# Patient Record
Sex: MALE | Race: WHITE | NOT HISPANIC OR LATINO | URBAN - METROPOLITAN AREA
[De-identification: names, ages, dates, MRNs, and addresses within clinical notes are randomized per-mention and may not be internally consistent; named-entity substitution may affect disease eponyms.]

---

## 2019-01-07 ENCOUNTER — EMERGENCY (EMERGENCY)
Facility: HOSPITAL | Age: 49
LOS: 0 days | Discharge: HOME | End: 2019-01-07
Attending: EMERGENCY MEDICINE | Admitting: EMERGENCY MEDICINE

## 2019-01-07 VITALS
RESPIRATION RATE: 18 BRPM | OXYGEN SATURATION: 98 % | SYSTOLIC BLOOD PRESSURE: 135 MMHG | HEART RATE: 76 BPM | DIASTOLIC BLOOD PRESSURE: 76 MMHG

## 2019-01-07 VITALS
DIASTOLIC BLOOD PRESSURE: 75 MMHG | TEMPERATURE: 95 F | WEIGHT: 214.95 LBS | HEIGHT: 70 IN | HEART RATE: 86 BPM | OXYGEN SATURATION: 99 % | RESPIRATION RATE: 18 BRPM | SYSTOLIC BLOOD PRESSURE: 137 MMHG

## 2019-01-07 DIAGNOSIS — E16.2 HYPOGLYCEMIA, UNSPECIFIED: ICD-10-CM

## 2019-01-07 DIAGNOSIS — E11.649 TYPE 2 DIABETES MELLITUS WITH HYPOGLYCEMIA WITHOUT COMA: ICD-10-CM

## 2019-01-07 DIAGNOSIS — I10 ESSENTIAL (PRIMARY) HYPERTENSION: ICD-10-CM

## 2019-01-07 LAB
ALBUMIN SERPL ELPH-MCNC: 4.6 G/DL — SIGNIFICANT CHANGE UP (ref 3.5–5.2)
ALP SERPL-CCNC: 124 U/L — HIGH (ref 30–115)
ALT FLD-CCNC: 19 U/L — SIGNIFICANT CHANGE UP (ref 0–41)
ANION GAP SERPL CALC-SCNC: 21 MMOL/L — HIGH (ref 7–14)
APPEARANCE UR: CLEAR — SIGNIFICANT CHANGE UP
AST SERPL-CCNC: 25 U/L — SIGNIFICANT CHANGE UP (ref 0–41)
BASOPHILS # BLD AUTO: 0.03 K/UL — SIGNIFICANT CHANGE UP (ref 0–0.2)
BASOPHILS NFR BLD AUTO: 0.2 % — SIGNIFICANT CHANGE UP (ref 0–1)
BILIRUB SERPL-MCNC: 0.2 MG/DL — SIGNIFICANT CHANGE UP (ref 0.2–1.2)
BILIRUB UR-MCNC: NEGATIVE — SIGNIFICANT CHANGE UP
BUN SERPL-MCNC: 15 MG/DL — SIGNIFICANT CHANGE UP (ref 10–20)
CALCIUM SERPL-MCNC: 9 MG/DL — SIGNIFICANT CHANGE UP (ref 8.5–10.1)
CHLORIDE SERPL-SCNC: 98 MMOL/L — SIGNIFICANT CHANGE UP (ref 98–110)
CO2 SERPL-SCNC: 20 MMOL/L — SIGNIFICANT CHANGE UP (ref 17–32)
COLOR SPEC: YELLOW — SIGNIFICANT CHANGE UP
CREAT SERPL-MCNC: 0.9 MG/DL — SIGNIFICANT CHANGE UP (ref 0.7–1.5)
DIFF PNL FLD: NEGATIVE — SIGNIFICANT CHANGE UP
EOSINOPHIL # BLD AUTO: 0 K/UL — SIGNIFICANT CHANGE UP (ref 0–0.7)
EOSINOPHIL NFR BLD AUTO: 0 % — SIGNIFICANT CHANGE UP (ref 0–8)
GLUCOSE SERPL-MCNC: 160 MG/DL — HIGH (ref 70–99)
GLUCOSE UR QL: 100 MG/DL
HCT VFR BLD CALC: 42.5 % — SIGNIFICANT CHANGE UP (ref 42–52)
HGB BLD-MCNC: 15 G/DL — SIGNIFICANT CHANGE UP (ref 14–18)
IMM GRANULOCYTES NFR BLD AUTO: 0.6 % — HIGH (ref 0.1–0.3)
KETONES UR-MCNC: NEGATIVE — SIGNIFICANT CHANGE UP
LEUKOCYTE ESTERASE UR-ACNC: NEGATIVE — SIGNIFICANT CHANGE UP
LYMPHOCYTES # BLD AUTO: 0.7 K/UL — LOW (ref 1.2–3.4)
LYMPHOCYTES # BLD AUTO: 4.2 % — LOW (ref 20.5–51.1)
MCHC RBC-ENTMCNC: 31.5 PG — HIGH (ref 27–31)
MCHC RBC-ENTMCNC: 35.3 G/DL — SIGNIFICANT CHANGE UP (ref 32–37)
MCV RBC AUTO: 89.3 FL — SIGNIFICANT CHANGE UP (ref 80–94)
MONOCYTES # BLD AUTO: 0.4 K/UL — SIGNIFICANT CHANGE UP (ref 0.1–0.6)
MONOCYTES NFR BLD AUTO: 2.4 % — SIGNIFICANT CHANGE UP (ref 1.7–9.3)
NEUTROPHILS # BLD AUTO: 15.44 K/UL — HIGH (ref 1.4–6.5)
NEUTROPHILS NFR BLD AUTO: 92.6 % — HIGH (ref 42.2–75.2)
NITRITE UR-MCNC: NEGATIVE — SIGNIFICANT CHANGE UP
NRBC # BLD: 0 /100 WBCS — SIGNIFICANT CHANGE UP (ref 0–0)
PH UR: 6 — SIGNIFICANT CHANGE UP (ref 5–8)
PLATELET # BLD AUTO: 282 K/UL — SIGNIFICANT CHANGE UP (ref 130–400)
POTASSIUM SERPL-MCNC: 4.4 MMOL/L — SIGNIFICANT CHANGE UP (ref 3.5–5)
POTASSIUM SERPL-SCNC: 4.4 MMOL/L — SIGNIFICANT CHANGE UP (ref 3.5–5)
PROT SERPL-MCNC: 7.9 G/DL — SIGNIFICANT CHANGE UP (ref 6–8)
PROT UR-MCNC: NEGATIVE MG/DL — SIGNIFICANT CHANGE UP
RBC # BLD: 4.76 M/UL — SIGNIFICANT CHANGE UP (ref 4.7–6.1)
RBC # FLD: 11.9 % — SIGNIFICANT CHANGE UP (ref 11.5–14.5)
SODIUM SERPL-SCNC: 139 MMOL/L — SIGNIFICANT CHANGE UP (ref 135–146)
SP GR SPEC: 1.01 — SIGNIFICANT CHANGE UP (ref 1.01–1.03)
UROBILINOGEN FLD QL: 0.2 MG/DL — SIGNIFICANT CHANGE UP (ref 0.2–0.2)
WBC # BLD: 16.67 K/UL — HIGH (ref 4.8–10.8)
WBC # FLD AUTO: 16.67 K/UL — HIGH (ref 4.8–10.8)

## 2019-01-07 NOTE — ED ADULT NURSE NOTE - NSIMPLEMENTINTERV_GEN_ALL_ED
Implemented All Universal Safety Interventions:  [place] to call system. Call bell, personal items and telephone within reach. Instruct patient to call for assistance. Room bathroom lighting operational. Non-slip footwear when patient is off stretcher. Physically safe environment: no spills, clutter or unnecessary equipment. Stretcher in lowest position, wheels locked, appropriate side rails in place.

## 2019-01-07 NOTE — ED PROVIDER NOTE - OBJECTIVE STATEMENT
47 yo M with PMHx of HTN and IDDM presents to the ED for evaluation of hypoglycemia. Pt was found unresponsive by significant other after he was not waking up to alarm. She called EMS and when EMS arrived, FS was 29. Glucagon was given with improvement in mental status. 47 yo M with PMHx of HTN and IDDM presents to the ED for evaluation of hypoglycemia. Pt was found unresponsive by significant other after he was not waking up to alarm. She called EMS and when EMS arrived, FS was 29. Glucagon was given with improvement in mental status. Pt denies any complaints. Pt denies fever, chills, nausea, vomiting, abdominal pain, diarrhea, headache, dizziness, weakness, chest pain, SOB, back pain, trauma, urinary symptoms, cough, calf pain/swelling, recent travel, recent surgery.

## 2019-01-07 NOTE — ED PROVIDER NOTE - PHYSICAL EXAMINATION
VITAL SIGNS: I have reviewed nursing notes and confirm.  CONSTITUTIONAL: Well-developed; well-nourished; in no acute distress.  SKIN: Skin exam is warm and dry, no acute rash.  HEAD: Normocephalic; atraumatic.  EYES: PERRL, EOM intact; conjunctiva and sclera clear.  ENT: No nasal discharge; airway clear.   NECK: Supple; non tender.  CARD: S1, S2 normal; no murmurs, gallops, or rubs. Regular rate and rhythm.  RESP: No wheezes, rales or rhonchi. Speaking in full sentences.   ABD: Normal bowel sounds; soft; non-distended; non-tender; No rebound or guarding.   EXT: Normal ROM. No clubbing, cyanosis or edema.  NEURO: Alert, oriented. Grossly unremarkable. No focal deficits. CN II-XII intact. No dysmetria. No ataxia. Sensation intact and equal throughout. Strength 5/5 throughout. Gait steady.

## 2019-01-07 NOTE — ED PROVIDER NOTE - ATTENDING CONTRIBUTION TO CARE
48m w HTN & IDDM presents for episode of hypoglycemia. Wife reports that patient did not wake up to alarm and was unarousable so she called EMS. EMS reports FS 29 and glucagon given w imrpoved mental state. Pt reports that his FS was 300's yesterday afternoon and he gave himself a higher bolus of insulin in the afternoon and then did not eat dinner last night. Pt reports feeling well otherwise. No other injury, no other complaints. No fall, no trauma.    Review of Systems  Constitutional:  No fever or chills.   Eyes:  Negative.   ENMT:  No nasal congestion, discharge, or throat pain.   Cardiac:  No chest pain, palpitations, or edema.  Respiratory:  No dyspnea, wheezing, or cough. No hemoptysis.  GI:  No vomiting, diarrhea, or abdominal pain. No melena or hematochezia.  :  No dysuria or hematuria.   Musculoskeletal:  No joint swelling, joint pain, or back pain.  Skin:  No skin rash, jaundice, or lesions.  Neuro:  No headache, loss of sensation, or focal weakness.      Physical Exam  General: Awake, alert, NAD, WDWN, non-toxic appearing, NCAT  Eyes: PERRL, EOMI, no icterus, lids and conjunctivae are normal  ENT: External inspection normal, pink/moist membranes, pharynx normal  CV: S1S2, regular rate and rhythm, no murmur/gallops/rubs, no JVD, 2+ pulses b/l, no edema/cords/homans, warm/well-perfused  Respiratory: Normal respiratory rate/effort, no respiratory distress, normal voice, speaking full sentences, lungs clear to auscultation b/l, no wheezing/rales/rhonchi, no retractions, no stridor  Abdomen: Soft abdomen, no tender/distended/guarding/rebound, no CVA tender  Musculoskeletal: FROM all 4 extremities, N/V intact  Neck: FROM neck, supple, no meningismus, trachea midline, no JVD  Integumentary: Color normal for race, warm and dry, no rash  Neuro: Oriented x3, CN 2-12 grossly intact, normal motor, normal sensory  Psych: Oriented x3, mood normal, affect normal     48m w episode of hypoglycemia after not eating and adjusting insulin dose. no trauma. --CBC, CMP, CXR, UA. --Pt being fed at this time, insulin pump removed during eval. 48m w HTN & IDDM presents for episode of hypoglycemia. Wife reports that patient did not wake up to alarm and was unarousable so she called EMS. EMS reports FS 29 and glucagon given w improved mental state. Pt reports that his FS was 300's yesterday afternoon and he gave himself a higher bolus of insulin in the afternoon and then did not eat dinner last night. Pt reports feeling well otherwise. No other injury, no other complaints. No fall, no trauma.    Review of Systems  Constitutional:  No fever or chills.   Eyes:  Negative.   ENMT:  No nasal congestion, discharge, or throat pain.   Cardiac:  No chest pain, palpitations, or edema.  Respiratory:  No dyspnea, wheezing, or cough. No hemoptysis.  GI:  No vomiting, diarrhea, or abdominal pain. No melena or hematochezia.  :  No dysuria or hematuria.   Musculoskeletal:  No joint swelling, joint pain, or back pain.  Skin:  No skin rash, jaundice, or lesions.  Neuro:  No headache, loss of sensation, or focal weakness.      Physical Exam  General: Awake, alert, NAD, WDWN, non-toxic appearing, NCAT  Eyes: PERRL, EOMI, no icterus, lids and conjunctivae are normal  ENT: External inspection normal, pink/moist membranes, pharynx normal  CV: S1S2, regular rate and rhythm, no murmur/gallops/rubs, no JVD, 2+ pulses b/l, no edema/cords/homans, warm/well-perfused  Respiratory: Normal respiratory rate/effort, no respiratory distress, normal voice, speaking full sentences, lungs clear to auscultation b/l, no wheezing/rales/rhonchi, no retractions, no stridor  Abdomen: Soft abdomen, no tender/distended/guarding/rebound, no CVA tender  Musculoskeletal: FROM all 4 extremities, N/V intact  Neck: FROM neck, supple, no meningismus, trachea midline, no JVD  Integumentary: Color normal for race, warm and dry, no rash  Neuro: Oriented x3, CN 2-12 grossly intact, normal motor, normal sensory  Psych: Oriented x3, mood normal, affect normal     48m w episode of hypoglycemia after not eating and adjusting insulin dose. no trauma. --CBC, CMP, CXR, UA. --Pt being fed at this time, insulin pump removed during eval, will observe for recurrence of hypoglycemia.

## 2019-01-07 NOTE — ED PROVIDER NOTE - NSFOLLOWUPINSTRUCTIONS_ED_ALL_ED_FT
Hypoglycemia    Hypoglycemia occurs when the glucose (sugar) level in your blood is too low. Symptoms include confusion, weakness, or fainting. You may even appear to be having a stroke. Take medications exactly as prescribed by your health care professional. Maintain a healthy lifestyle and follow up with your primary care physician.    SEEK IMMEDIATE MEDICAL CARE IF YOU HAVE ANY OF THE FOLLOWING SYMPTOMS: weakness, fainting, change in mental status, nausea or vomiting, fruity smell to your breath, or any signs of dehydration.

## 2019-01-07 NOTE — ED ADULT NURSE NOTE - OBJECTIVE STATEMENT
pt aox4 ; ems brought pt to ed; wife states his alarm went off and pt did not respond to his alarm as he usually does.  wife reports pt was extremely lethargic and not responding to verbal.  EMS arrived to house and pt f/s was 29.  pt states he last remembers ems waking him up. pt received glucagon and sugar paste from ems. f/s 169. pt has insulin pump attached to him that is now removed on arrival to ed. pt states his f/s was in the 300s yesterday afternoon so he did not eat dinner last night.  pt in no distress at this time. given food and drink. tolerating po. iv in place, labs sent. Will continue to monitor/assess

## 2019-01-07 NOTE — ED ADULT NURSE REASSESSMENT NOTE - NS ED NURSE REASSESS COMMENT FT1
pt assessed A&Ox3, pt wife at bedside, pt states he is feeling much better and as per wife he looks much better, urine collect from patient and sent to lab as per MD orders. safety and comfort measures maintained. will continue to monitor

## 2019-01-07 NOTE — ED ADULT TRIAGE NOTE - CHIEF COMPLAINT QUOTE
BIBA pt was found by girlfriend unresponsive, FS 29 by EMS, received sugar paste and 1g glucagon, repeat . FS in triage 123.

## 2019-01-07 NOTE — ED PROVIDER NOTE - PROGRESS NOTE DETAILS
Pt signed out to me by KERMIT Nascimento. Will f/u labs and continue to monitor Signed out to Dr Graham. Pt being observed for recurrence of hypoglycemia. Signed out to Dr Graham. Labs pending. Pt being observed for recurrence of hypoglycemia. Pt continues to remain asymptomatic. Tolerating PO intake without nausea/vomiting. DM with insulin pump, plausible reason for hypoglycemia, labs WNL, asymptomatic in ED, plan to d/c, pt amendable to plan. The patient was given detailed return precautions and advised to return to the emergency department if any new symptoms developed, symptoms worsened or for any concerns. The patient was offered the opportunity to ask questions and verbalized that they understand the diagnosis and discharge instructions.    Incidental chest radiographic findings were discussed with the patient and a copy of the findings were given to the patient. The patient was advised to follow up as an outpatient for further evaluation and management of these findings. The patient verbalized that they understand these instructions.

## 2019-09-03 ENCOUNTER — EMERGENCY (EMERGENCY)
Facility: HOSPITAL | Age: 49
LOS: 0 days | Discharge: LEFT BEFORE DISCHARGE | End: 2019-09-03
Attending: EMERGENCY MEDICINE | Admitting: HOSPITALIST
Payer: COMMERCIAL

## 2019-09-03 VITALS
SYSTOLIC BLOOD PRESSURE: 118 MMHG | DIASTOLIC BLOOD PRESSURE: 73 MMHG | HEART RATE: 89 BPM | TEMPERATURE: 99 F | RESPIRATION RATE: 18 BRPM | OXYGEN SATURATION: 98 %

## 2019-09-03 VITALS — WEIGHT: 220.02 LBS

## 2019-09-03 DIAGNOSIS — E10.649 TYPE 1 DIABETES MELLITUS WITH HYPOGLYCEMIA WITHOUT COMA: ICD-10-CM

## 2019-09-03 DIAGNOSIS — Z90.49 ACQUIRED ABSENCE OF OTHER SPECIFIED PARTS OF DIGESTIVE TRACT: Chronic | ICD-10-CM

## 2019-09-03 PROBLEM — E11.9 TYPE 2 DIABETES MELLITUS WITHOUT COMPLICATIONS: Chronic | Status: ACTIVE | Noted: 2019-01-07

## 2019-09-03 PROBLEM — I10 ESSENTIAL (PRIMARY) HYPERTENSION: Chronic | Status: ACTIVE | Noted: 2019-01-07

## 2019-09-03 LAB
ALBUMIN SERPL ELPH-MCNC: 4.5 G/DL — SIGNIFICANT CHANGE UP (ref 3.5–5.2)
ALP SERPL-CCNC: 113 U/L — SIGNIFICANT CHANGE UP (ref 30–115)
ALT FLD-CCNC: 25 U/L — SIGNIFICANT CHANGE UP (ref 0–41)
ANION GAP SERPL CALC-SCNC: 17 MMOL/L — HIGH (ref 7–14)
AST SERPL-CCNC: 27 U/L — SIGNIFICANT CHANGE UP (ref 0–41)
BILIRUB SERPL-MCNC: 0.2 MG/DL — SIGNIFICANT CHANGE UP (ref 0.2–1.2)
BUN SERPL-MCNC: 17 MG/DL — SIGNIFICANT CHANGE UP (ref 10–20)
CALCIUM SERPL-MCNC: 9 MG/DL — SIGNIFICANT CHANGE UP (ref 8.5–10.1)
CHLORIDE SERPL-SCNC: 102 MMOL/L — SIGNIFICANT CHANGE UP (ref 98–110)
CO2 SERPL-SCNC: 22 MMOL/L — SIGNIFICANT CHANGE UP (ref 17–32)
CREAT SERPL-MCNC: 1.1 MG/DL — SIGNIFICANT CHANGE UP (ref 0.7–1.5)
GLUCOSE SERPL-MCNC: 66 MG/DL — LOW (ref 70–99)
HCT VFR BLD CALC: 40.5 % — LOW (ref 42–52)
HGB BLD-MCNC: 14 G/DL — SIGNIFICANT CHANGE UP (ref 14–18)
MCHC RBC-ENTMCNC: 31.9 PG — HIGH (ref 27–31)
MCHC RBC-ENTMCNC: 34.6 G/DL — SIGNIFICANT CHANGE UP (ref 32–37)
MCV RBC AUTO: 92.3 FL — SIGNIFICANT CHANGE UP (ref 80–94)
NRBC # BLD: 0 /100 WBCS — SIGNIFICANT CHANGE UP (ref 0–0)
PLATELET # BLD AUTO: 284 K/UL — SIGNIFICANT CHANGE UP (ref 130–400)
POTASSIUM SERPL-MCNC: 4.6 MMOL/L — SIGNIFICANT CHANGE UP (ref 3.5–5)
POTASSIUM SERPL-SCNC: 4.6 MMOL/L — SIGNIFICANT CHANGE UP (ref 3.5–5)
PROT SERPL-MCNC: 7.7 G/DL — SIGNIFICANT CHANGE UP (ref 6–8)
RBC # BLD: 4.39 M/UL — LOW (ref 4.7–6.1)
RBC # FLD: 11.7 % — SIGNIFICANT CHANGE UP (ref 11.5–14.5)
SODIUM SERPL-SCNC: 141 MMOL/L — SIGNIFICANT CHANGE UP (ref 135–146)
WBC # BLD: 11.02 K/UL — HIGH (ref 4.8–10.8)
WBC # FLD AUTO: 11.02 K/UL — HIGH (ref 4.8–10.8)

## 2019-09-03 PROCEDURE — 99285 EMERGENCY DEPT VISIT HI MDM: CPT

## 2019-09-03 NOTE — ED ADULT NURSE REASSESSMENT NOTE - NS ED NURSE REASSESS COMMENT FT1
Pt a&ox3,  as per pt reading from insulin pump. Pt refusing to stay and wants to leave AMA, MD Lauren spoke with pt at bedside. Pt refusing to wait for admitting resident to sign pt out AMA. MD verbalized to pt risks of leaving AMA. Md removed pts IV at bedside and pt left without letting RN take new set of VS. Pt ambulated to car independently. MAR notified about elopement.

## 2019-09-03 NOTE — ED ADULT NURSE NOTE - NS ED NURSE ELOPE COMMENTS
MD Lauren removed pts IV, discussed risks of leaving AMA. Pt did not want to wait to admitting resident or have VS taken by RN. Pt ambulated independently out of hospital, friend at pts side. MAR notified of elopement.

## 2019-09-03 NOTE — SBIRT NOTE ADULT - NSSBIRTALCPOSREINDET_GEN_A_CORE
Positive reinforcement provided given patient currently within healthy guidelines. Education materials reviewed and given to patient.

## 2019-09-03 NOTE — ED ADULT TRIAGE NOTE - CHIEF COMPLAINT QUOTE
Hypoglycemia. Pt BIBA, found by wife on couch after drinking, hard to arouse, FS was 23 by EMS, given 1 amp d50, fs 119 in triage, awake and alert. Pt is diabetic with insulin pump in place.

## 2019-09-03 NOTE — ED ADULT NURSE NOTE - OBJECTIVE STATEMENT
Pt a&ox3,pt BIBA this morning 2/2 being unarousable after drinking found by wife. Pts FS 23 as per EMS, IV administered given 1 amp of D50. Pt alert and awake, ambulated to bathroom with steady gait. Pt denies n/v/d, denies fever/chills, denies abd pain, denies CP/SOB, denies vision changes, changes in urinary patterns.

## 2019-09-03 NOTE — ED PROVIDER NOTE - PHYSICAL EXAMINATION
Vital Signs: I have reviewed the initial vital signs.  Constitutional: NAD, appears stated age.  HEENT: Airway patent, moist MM, no erythema/swelling/deformity of oral structures. EOMI, PERRLA.  CV: regular rate, regular rhythm, well-perfused extremities, 2+ b/l DP and radial pulses equal.  Lungs: BCTA, no increased WOB.  ABD: NTND, no guarding or rebound, no pulsatile mass, no hernias. +BS  MSK: Neck supple, nontender, nl ROM, no stepoff. Chest nontender. Back nontender in TLS spine or to b/l bony structures or flanks. Ext nontender, nl rom, no deformity.   INTEG: Skin warm, dry, no rash. No edema.  NEURO: A&O, normal strength, nl sensation throughout, normal speech.   PSYCH: Calm, cooperative, normal affect and interaction.

## 2019-09-03 NOTE — ED PROVIDER NOTE - NS ED ROS FT
Constitutional: (-) fever, (-) chills  Eyes/ENT: (-) blurry vision, (-) epistaxis, (-) sore throat  Cardiovascular: (-) chest pain, (-) syncope  Respiratory: (-) cough, (-) shortness of breath  Gastrointestinal: (-) abdominal pain, (-) vomiting, (-) diarrhea  Musculoskeletal: (-) neck pain, (-) back pain, (-) joint pain  Integumentary: (-) rash, (-) edema  Neurological: (-) headache, (-) altered mental status  : (-) dysuria, hematuria.  Allergic/Immunologic: (-) pruritus, rash

## 2019-09-03 NOTE — ED PROVIDER NOTE - OBJECTIVE STATEMENT
50yo male with PMH DM type 1 and hypertension, presenting to the ED after being found unresponsive at home by girlfriend. EMS was called, FS at that time 23; was given 1 amp D50 - FS recovered to 119. Patient unable to recall events leading up to presentation; states he was at home, was hyperglycemic in the 300s yesterday and was dosing himself insulin all day via pump; had a few alcoholic beverages in the evening. Last thing he remembers was going to sleep. Denies any fall or trauma. Is currently accompanied by friend. States the last time this happened was in January 2019. He has been using his insulin pump for the last 3 years or so, and follows up with an endocrinologist every 6 months.    In the ED, patient is alert and oriented, able to participate in the interview 48yo male with PMH DM type 1 on insulin pump and hypertension, presenting to the ED after being found unresponsive at home by girlfriend. EMS was called, FS at that time 23; was given 1 amp D50 - FS recovered to 119. Is alert and oriented in the ED. Patient unable to recall events leading up to presentation; states he was at home, was hyperglycemic in the 300s yesterday and was dosing himself insulin all day via pump; had a few alcoholic beverages in the evening. Last thing he remembers was going to sleep. Denies any fall or trauma, fever, chills, polyuria, polydipsia, abdominal pain, nausea.

## 2019-09-03 NOTE — ED PROVIDER NOTE - ATTENDING CONTRIBUTION TO CARE
I personally evaluated the patient. I reviewed the Resident’s or Physician Assistant’s note (as assigned above), and agree with the findings and plan except as documented in my note.    50 y/o male with PMH DM type 1 on insulin pump and hypertension, presenting to the ED after being found unresponsive at home by girlfriend. EMS was called, FS at that time 23; was given 1 amp D50 - FS recovered to 119. Pt on insulin pump but he does not know his insulin regimen. Denies any CP, SOB. No abd pain. Reports heavy drinking yesterday.   CONSTITUTIONAL: Well-developed; well-nourished; in no acute distress. Sitting up and providing appropriate history and physical examination  SKIN: skin exam is warm and dry, no acute rash.  HEAD: Normocephalic; atraumatic.  EYES: PERRL, 3 mm bilateral, no nystagmus, EOM intact; conjunctiva and sclera clear.  ENT: No nasal discharge; airway clear.  NECK: Supple; non tender.+ full passive ROM in all directions. No JVD  CARD: S1, S2 normal; no murmurs, gallops, or rubs. Regular rate and rhythm. + Symmetric Strong Pulses  RESP: No wheezes, rales or rhonchi. Good air movement bilaterally  ABD: soft; non-distended; non-tender. No Rebound, No Gaurding, No signs of peritnitis, No CVA tenderness  EXT: Normal ROM. No clubbing, cyanosis or edema. Dp and Pt Pulses intact. Cap refill less than 3 seconds  NEURO: CN 2-12 intact, normal finger to nose, normal romberg, stable gait, no sensory or motor deficits, Alert, oriented, grossly unremarkable. No Focal deficits. GCS 15. NIH 0  PSYCH: Cooperative, appropriate.    Plan- labs, reassess

## 2019-09-05 DIAGNOSIS — E16.2 HYPOGLYCEMIA, UNSPECIFIED: ICD-10-CM

## 2019-09-05 DIAGNOSIS — Z96.41 PRESENCE OF INSULIN PUMP (EXTERNAL) (INTERNAL): ICD-10-CM

## 2019-09-05 DIAGNOSIS — I10 ESSENTIAL (PRIMARY) HYPERTENSION: ICD-10-CM

## 2019-09-05 DIAGNOSIS — E10.649 TYPE 1 DIABETES MELLITUS WITH HYPOGLYCEMIA WITHOUT COMA: ICD-10-CM

## 2019-09-05 DIAGNOSIS — Z79.4 LONG TERM (CURRENT) USE OF INSULIN: ICD-10-CM

## 2019-10-02 PROBLEM — Z00.00 ENCOUNTER FOR PREVENTIVE HEALTH EXAMINATION: Status: ACTIVE | Noted: 2019-10-02

## 2019-10-04 ENCOUNTER — APPOINTMENT (OUTPATIENT)
Dept: ORTHOPEDIC SURGERY | Facility: CLINIC | Age: 49
End: 2019-10-04
Payer: COMMERCIAL

## 2019-10-04 ENCOUNTER — OUTPATIENT (OUTPATIENT)
Dept: OUTPATIENT SERVICES | Facility: HOSPITAL | Age: 49
LOS: 1 days | Discharge: HOME | End: 2019-10-04
Payer: COMMERCIAL

## 2019-10-04 VITALS — BODY MASS INDEX: 31.5 KG/M2 | HEIGHT: 70 IN | WEIGHT: 220 LBS

## 2019-10-04 DIAGNOSIS — J45.909 UNSPECIFIED ASTHMA, UNCOMPLICATED: ICD-10-CM

## 2019-10-04 DIAGNOSIS — M16.11 UNILATERAL PRIMARY OSTEOARTHRITIS, RIGHT HIP: ICD-10-CM

## 2019-10-04 DIAGNOSIS — Z78.9 OTHER SPECIFIED HEALTH STATUS: ICD-10-CM

## 2019-10-04 DIAGNOSIS — Z80.42 FAMILY HISTORY OF MALIGNANT NEOPLASM OF PROSTATE: ICD-10-CM

## 2019-10-04 DIAGNOSIS — Z90.49 ACQUIRED ABSENCE OF OTHER SPECIFIED PARTS OF DIGESTIVE TRACT: Chronic | ICD-10-CM

## 2019-10-04 DIAGNOSIS — M25.551 PAIN IN RIGHT HIP: ICD-10-CM

## 2019-10-04 DIAGNOSIS — E11.9 TYPE 2 DIABETES MELLITUS W/OUT COMPLICATIONS: ICD-10-CM

## 2019-10-04 DIAGNOSIS — I10 ESSENTIAL (PRIMARY) HYPERTENSION: ICD-10-CM

## 2019-10-04 PROCEDURE — 99204 OFFICE O/P NEW MOD 45 MIN: CPT

## 2019-10-04 PROCEDURE — 73502 X-RAY EXAM HIP UNI 2-3 VIEWS: CPT | Mod: 26,RT

## 2019-10-04 RX ORDER — MONTELUKAST 10 MG/1
TABLET, FILM COATED ORAL
Refills: 0 | Status: ACTIVE | COMMUNITY

## 2019-10-04 RX ORDER — PRAVASTATIN SODIUM 80 MG/1
TABLET ORAL
Refills: 0 | Status: ACTIVE | COMMUNITY

## 2019-10-04 RX ORDER — INSULIN ASPART 100 [IU]/ML
INJECTION, SOLUTION INTRAVENOUS; SUBCUTANEOUS
Refills: 0 | Status: ACTIVE | COMMUNITY

## 2019-10-04 RX ORDER — AMLODIPINE BESYLATE 5 MG/1
TABLET ORAL
Refills: 0 | Status: ACTIVE | COMMUNITY

## 2019-10-04 NOTE — PHYSICAL EXAM
[de-identified] : General: Patient is awake and alert, demonstrates appropriate mood and affect, exhibits normal breathing and is in no acute distress.\par Psych: The patient is appropriately dressed and groomed, maintains good eye contact. Alert and oriented x 3. Normal attention/concentration, fund of knowledge and recall. Normal speech rate and rhythm. No hallucinations, suicidal or homicidal ideations. Demonstrates expected level of insight and judgment regarding health.\par Skin: The patient has no chronic skin lesions, rashes, or ulcers. There is no induration or erythema of uninvolved extremities. For skin exam of involved extremity refer to detailed musculoskeletal/extremity exam. \par Lymph: No cervical, axillary, or popliteal lymphadenopathy. There is no swelling or lymphedema in uninvolved extremities, refer to detailed exam for involved limbs.\par Cardiovascular: No visible jugular venous distention. Normal point of maximal impulse without thrill. There is brisk capillary refill in the digits of the affected extremity. They are symmetric pulses in the bilateral upper and lower extremities. \par Respiratory: The patient is in no apparent respiratory distress. They're taking full deep breaths wirh normal excursion, without use of accessory muscles or evidence of audible wheezes or stridor without the use of a stethoscope. \par Neurological: 5/5 motor strength and sensation intact throughout uninvolved upper and loweer extremities, refer to detailed musculoskeletal exam regarding involved extremity.\par Neck: Full range of motion with flexon, extention, rotation, and side bending; no palpable crepitus, normal alignment and lordosis, symmetric appearance, midline trachea, no thyroid hypertrophy or nodules\par Musculoskeletal: severely antalgic swing through gait]. good posture. normal clinical alignment of the spine. full range of motion in [bilateral] upper and [bilateral] lower extremities.\par \par [Right] Hip exam:\par Inspection:  Skin exam, Evaluation of Trendellenberg sign and standing pelvic obliquity, evaluation for antalgic or Trendellenberg gait\par Palpation:  Evaluation for tenderness at the pubic symphysis, in the inguinal crease, along the psoas tendon, at the abductor tendon insertion and trochanteric bursa, posteriorly along the external rotators, along the ischial tuberosity and at the SI joint\par Strength testing:  Hip flexion, abduction, extention and adduction with specific assesment for pain with resisted hip flexion\par Special tests:  RIGO, FADIR, Dinora, Impingement test, Stinchfield\par Concomitant L-spine exam performed with paraspinal, central, and SI joint palpation.  Evaluation of lumbar lordosis, seated and supine straight leg raise, slump test, sensory exam, and strength testing L2-S1\par \par Siginificant positive findings were as follows, with all other findings within normal limits:\par Range of Motion: Patient has severe limitation with range of motion.  Maximum flexion on exam today is 80 degrees.  Maximum abduction is 15 degrees.  Internal rotation is less than 0 with obligate external rotation of 5 to 10 degrees.  External rotation is less than 45 degrees.\par Additional: Patient has tenderness to palpation throughout the inguinal crease.  Any range of motion of the hip exacerbates the symptoms.  Positive Stinchfield.\par \par  [de-identified] : Weightbearing AP pelvis with 4590 degrees done views demonstrate presence of significant bilateral hip osteoarthritis with more severe involvement on the right.  There is less than 1.5 mm of joint space remaining in the superior weightbearing surface of the hip.

## 2019-10-04 NOTE — HISTORY OF PRESENT ILLNESS
[8] : a current pain level of 8/10 [Walking] : worsened by walking [Rest] : relieved by rest [de-identified] : 49-year-old active male presents for evaluation of progressive hip pain over the last several years.  The right hip pain is been more severe over the last several months.  Recently the patient states that he is beginning to have difficulty ambulating even short distances.  He is previously attempted to treat the pain with oral anti-inflammatories but this is no longer sufficient.  He is never been evaluated by an orthopedic surgeon.  He has not received any injections.  He has not attempted any physical therapy. [de-identified] : stairs

## 2019-10-04 NOTE — DISCUSSION/SUMMARY
[de-identified] : 49-year-old male with moderate to severe osteoarthritis of the right hip and moderate osteoarthritis of the left hip with severe functional limitations and loss in range of motion.  Recommend treatment options to include oral anti-inflammatories, corticosteroid injections, total hip arthroplasty.  He is Alfredo failed oral medications and based on the severity of his disease, his young age, and his desired activity level I think he is unlikely to receive significant long-term relief from injections alone.  My recommendation would be for anterior anterolateral approach total hip arthroplasty.  He was provided with the name of several orthopedic surgeons including Dr. Dario Maldonado who can provide this service.

## 2019-12-03 ENCOUNTER — OUTPATIENT (OUTPATIENT)
Dept: OUTPATIENT SERVICES | Facility: HOSPITAL | Age: 49
LOS: 1 days | Discharge: HOME | End: 2019-12-03
Payer: COMMERCIAL

## 2019-12-03 VITALS
HEART RATE: 68 BPM | HEIGHT: 70 IN | WEIGHT: 214.95 LBS | RESPIRATION RATE: 15 BRPM | SYSTOLIC BLOOD PRESSURE: 131 MMHG | TEMPERATURE: 98 F | DIASTOLIC BLOOD PRESSURE: 74 MMHG | OXYGEN SATURATION: 99 %

## 2019-12-03 DIAGNOSIS — Z01.818 ENCOUNTER FOR OTHER PREPROCEDURAL EXAMINATION: ICD-10-CM

## 2019-12-03 DIAGNOSIS — M16.11 UNILATERAL PRIMARY OSTEOARTHRITIS, RIGHT HIP: ICD-10-CM

## 2019-12-03 DIAGNOSIS — M26.10 UNSPECIFIED ANOMALY OF JAW-CRANIAL BASE RELATIONSHIP: Chronic | ICD-10-CM

## 2019-12-03 DIAGNOSIS — Z98.890 OTHER SPECIFIED POSTPROCEDURAL STATES: Chronic | ICD-10-CM

## 2019-12-03 DIAGNOSIS — Z90.49 ACQUIRED ABSENCE OF OTHER SPECIFIED PARTS OF DIGESTIVE TRACT: Chronic | ICD-10-CM

## 2019-12-03 LAB
ALBUMIN SERPL ELPH-MCNC: 4.5 G/DL — SIGNIFICANT CHANGE UP (ref 3.5–5.2)
ALP SERPL-CCNC: 98 U/L — SIGNIFICANT CHANGE UP (ref 30–115)
ALT FLD-CCNC: 18 U/L — SIGNIFICANT CHANGE UP (ref 0–41)
ANION GAP SERPL CALC-SCNC: 14 MMOL/L — SIGNIFICANT CHANGE UP (ref 7–14)
APPEARANCE UR: CLEAR — SIGNIFICANT CHANGE UP
APTT BLD: 32.2 SEC — SIGNIFICANT CHANGE UP (ref 27–39.2)
AST SERPL-CCNC: 19 U/L — SIGNIFICANT CHANGE UP (ref 0–41)
BACTERIA # UR AUTO: NEGATIVE — SIGNIFICANT CHANGE UP
BASOPHILS # BLD AUTO: 0.05 K/UL — SIGNIFICANT CHANGE UP (ref 0–0.2)
BASOPHILS NFR BLD AUTO: 0.8 % — SIGNIFICANT CHANGE UP (ref 0–1)
BILIRUB SERPL-MCNC: 0.7 MG/DL — SIGNIFICANT CHANGE UP (ref 0.2–1.2)
BILIRUB UR-MCNC: NEGATIVE — SIGNIFICANT CHANGE UP
BLD GP AB SCN SERPL QL: SIGNIFICANT CHANGE UP
BUN SERPL-MCNC: 16 MG/DL — SIGNIFICANT CHANGE UP (ref 10–20)
CALCIUM SERPL-MCNC: 9.5 MG/DL — SIGNIFICANT CHANGE UP (ref 8.5–10.1)
CHLORIDE SERPL-SCNC: 103 MMOL/L — SIGNIFICANT CHANGE UP (ref 98–110)
CO2 SERPL-SCNC: 24 MMOL/L — SIGNIFICANT CHANGE UP (ref 17–32)
COLOR SPEC: YELLOW — SIGNIFICANT CHANGE UP
CREAT SERPL-MCNC: 1 MG/DL — SIGNIFICANT CHANGE UP (ref 0.7–1.5)
DIFF PNL FLD: ABNORMAL
EOSINOPHIL # BLD AUTO: 0.44 K/UL — SIGNIFICANT CHANGE UP (ref 0–0.7)
EOSINOPHIL NFR BLD AUTO: 7.5 % — SIGNIFICANT CHANGE UP (ref 0–8)
EPI CELLS # UR: 0 /HPF — SIGNIFICANT CHANGE UP (ref 0–5)
ESTIMATED AVERAGE GLUCOSE: 151 MG/DL — HIGH (ref 68–114)
GLUCOSE SERPL-MCNC: 163 MG/DL — HIGH (ref 70–99)
GLUCOSE UR QL: NEGATIVE — SIGNIFICANT CHANGE UP
HBA1C BLD-MCNC: 6.9 % — HIGH (ref 4–5.6)
HCT VFR BLD CALC: 41.1 % — LOW (ref 42–52)
HGB BLD-MCNC: 13.7 G/DL — LOW (ref 14–18)
HYALINE CASTS # UR AUTO: 0 /LPF — SIGNIFICANT CHANGE UP (ref 0–7)
IMM GRANULOCYTES NFR BLD AUTO: 0.3 % — SIGNIFICANT CHANGE UP (ref 0.1–0.3)
INR BLD: 0.95 RATIO — SIGNIFICANT CHANGE UP (ref 0.65–1.3)
KETONES UR-MCNC: NEGATIVE — SIGNIFICANT CHANGE UP
LEUKOCYTE ESTERASE UR-ACNC: NEGATIVE — SIGNIFICANT CHANGE UP
LYMPHOCYTES # BLD AUTO: 1.75 K/UL — SIGNIFICANT CHANGE UP (ref 1.2–3.4)
LYMPHOCYTES # BLD AUTO: 29.7 % — SIGNIFICANT CHANGE UP (ref 20.5–51.1)
MCHC RBC-ENTMCNC: 31.3 PG — HIGH (ref 27–31)
MCHC RBC-ENTMCNC: 33.3 G/DL — SIGNIFICANT CHANGE UP (ref 32–37)
MCV RBC AUTO: 93.8 FL — SIGNIFICANT CHANGE UP (ref 80–94)
MONOCYTES # BLD AUTO: 0.53 K/UL — SIGNIFICANT CHANGE UP (ref 0.1–0.6)
MONOCYTES NFR BLD AUTO: 9 % — SIGNIFICANT CHANGE UP (ref 1.7–9.3)
NEUTROPHILS # BLD AUTO: 3.1 K/UL — SIGNIFICANT CHANGE UP (ref 1.4–6.5)
NEUTROPHILS NFR BLD AUTO: 52.7 % — SIGNIFICANT CHANGE UP (ref 42.2–75.2)
NITRITE UR-MCNC: NEGATIVE — SIGNIFICANT CHANGE UP
NRBC # BLD: 0 /100 WBCS — SIGNIFICANT CHANGE UP (ref 0–0)
PH UR: 6 — SIGNIFICANT CHANGE UP (ref 5–8)
PLATELET # BLD AUTO: 279 K/UL — SIGNIFICANT CHANGE UP (ref 130–400)
POTASSIUM SERPL-MCNC: 4.2 MMOL/L — SIGNIFICANT CHANGE UP (ref 3.5–5)
POTASSIUM SERPL-SCNC: 4.2 MMOL/L — SIGNIFICANT CHANGE UP (ref 3.5–5)
PROT SERPL-MCNC: 7.2 G/DL — SIGNIFICANT CHANGE UP (ref 6–8)
PROT UR-MCNC: SIGNIFICANT CHANGE UP
PROTHROM AB SERPL-ACNC: 10.9 SEC — SIGNIFICANT CHANGE UP (ref 9.95–12.87)
RBC # BLD: 4.38 M/UL — LOW (ref 4.7–6.1)
RBC # FLD: 11.9 % — SIGNIFICANT CHANGE UP (ref 11.5–14.5)
RBC CASTS # UR COMP ASSIST: 10 /HPF — HIGH (ref 0–4)
SODIUM SERPL-SCNC: 141 MMOL/L — SIGNIFICANT CHANGE UP (ref 135–146)
SP GR SPEC: 1.02 — SIGNIFICANT CHANGE UP (ref 1.01–1.02)
UROBILINOGEN FLD QL: SIGNIFICANT CHANGE UP
WBC # BLD: 5.89 K/UL — SIGNIFICANT CHANGE UP (ref 4.8–10.8)
WBC # FLD AUTO: 5.89 K/UL — SIGNIFICANT CHANGE UP (ref 4.8–10.8)
WBC UR QL: 1 /HPF — SIGNIFICANT CHANGE UP (ref 0–5)

## 2019-12-03 PROCEDURE — 73502 X-RAY EXAM HIP UNI 2-3 VIEWS: CPT | Mod: 26,RT

## 2019-12-03 PROCEDURE — 93010 ELECTROCARDIOGRAM REPORT: CPT

## 2019-12-03 PROCEDURE — 71046 X-RAY EXAM CHEST 2 VIEWS: CPT | Mod: 26

## 2019-12-03 NOTE — H&P PST ADULT - RS GEN PE MLT RESP DETAILS PC
respirations non-labored/airway patent/clear to auscultation bilaterally/no chest wall tenderness/normal/breath sounds equal/good air movement

## 2019-12-03 NOTE — H&P PST ADULT - SKIN
Patient seen and evaluated at bedside. Resting comfortably, NAD. Pt reports chest pressure resolved 1 hour after onset. Denies palpitations, SOB, any radiation of pain, back pain. Denies recent illness, cough, abd pain, urinary symptoms. VSS, HR low 90s on tele. Will continue NS @100cc/hr, and continue to monitor. No lesions; no rash

## 2019-12-03 NOTE — H&P PST ADULT - NSICDXPASTSURGICALHX_GEN_ALL_CORE_FT
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

## 2019-12-03 NOTE — H&P PST ADULT - NSANTHOSAYNRD_GEN_A_CORE
No. CHITO screening performed.  STOP BANG Legend: 0-2 = LOW Risk; 3-4 = INTERMEDIATE Risk; 5-8 = HIGH Risk

## 2019-12-04 LAB
CULTURE RESULTS: NO GROWTH — SIGNIFICANT CHANGE UP
SPECIMEN SOURCE: SIGNIFICANT CHANGE UP

## 2019-12-20 NOTE — PROGRESS NOTE ADULT - SUBJECTIVE AND OBJECTIVE BOX
PAST documents reviewed - MED. DIR. PAST - ANESTHESIA - as of this review for patient scheduled for Right THR under Spinal / Regional Anesthesia with  on 12/23/2019 : I called the patient to discuss the Anesthesia Plan and Medications. He attended the Joint class and is aware of the ERAS / Gatorade protocol and his understanding was confirmed with our discussion . He knows he is to receive Spinal / Regional Anesthesia which I explained to him preliminarily and informed him that the assigned Anesthesiologist would explain everything in full pre op . Patient on Insulin pump and the Endocrine Consult is on chart . Patient is to take only his Amlodipine DOS . He takes no AC / Antiplatelets / Rx or OTC NSAID 's / nor ASA containing meds ( all explained in full ) . Consult reviewed and appropriate pre op meds are ordered .

## 2019-12-23 ENCOUNTER — RESULT REVIEW (OUTPATIENT)
Age: 49
End: 2019-12-23

## 2019-12-23 ENCOUNTER — INPATIENT (INPATIENT)
Facility: HOSPITAL | Age: 49
LOS: 0 days | Discharge: HOME HEALTH | End: 2019-12-24
Attending: ORTHOPAEDIC SURGERY | Admitting: ORTHOPAEDIC SURGERY
Payer: COMMERCIAL

## 2019-12-23 VITALS
HEIGHT: 70 IN | HEART RATE: 100 BPM | RESPIRATION RATE: 20 BRPM | TEMPERATURE: 98 F | WEIGHT: 220.02 LBS | SYSTOLIC BLOOD PRESSURE: 141 MMHG | DIASTOLIC BLOOD PRESSURE: 85 MMHG

## 2019-12-23 DIAGNOSIS — M26.10 UNSPECIFIED ANOMALY OF JAW-CRANIAL BASE RELATIONSHIP: Chronic | ICD-10-CM

## 2019-12-23 DIAGNOSIS — Z98.890 OTHER SPECIFIED POSTPROCEDURAL STATES: Chronic | ICD-10-CM

## 2019-12-23 DIAGNOSIS — Z90.49 ACQUIRED ABSENCE OF OTHER SPECIFIED PARTS OF DIGESTIVE TRACT: Chronic | ICD-10-CM

## 2019-12-23 PROCEDURE — 88304 TISSUE EXAM BY PATHOLOGIST: CPT | Mod: 26

## 2019-12-23 PROCEDURE — 88311 DECALCIFY TISSUE: CPT | Mod: 26

## 2019-12-23 RX ORDER — AMLODIPINE BESYLATE 2.5 MG/1
5 TABLET ORAL DAILY
Refills: 0 | Status: DISCONTINUED | OUTPATIENT
Start: 2019-12-23 | End: 2019-12-24

## 2019-12-23 RX ORDER — VANCOMYCIN HCL 1 G
1500 VIAL (EA) INTRAVENOUS ONCE
Refills: 0 | Status: COMPLETED | OUTPATIENT
Start: 2019-12-23 | End: 2019-12-23

## 2019-12-23 RX ORDER — HYDROMORPHONE HYDROCHLORIDE 2 MG/ML
0.5 INJECTION INTRAMUSCULAR; INTRAVENOUS; SUBCUTANEOUS
Refills: 0 | Status: DISCONTINUED | OUTPATIENT
Start: 2019-12-23 | End: 2019-12-23

## 2019-12-23 RX ORDER — SODIUM CHLORIDE 9 MG/ML
1000 INJECTION, SOLUTION INTRAVENOUS
Refills: 0 | Status: DISCONTINUED | OUTPATIENT
Start: 2019-12-23 | End: 2019-12-23

## 2019-12-23 RX ORDER — INSULIN HUMAN 100 [IU]/ML
10 INJECTION, SOLUTION SUBCUTANEOUS
Qty: 100 | Refills: 0 | Status: DISCONTINUED | OUTPATIENT
Start: 2019-12-23 | End: 2019-12-23

## 2019-12-23 RX ORDER — ONDANSETRON 8 MG/1
4 TABLET, FILM COATED ORAL ONCE
Refills: 0 | Status: DISCONTINUED | OUTPATIENT
Start: 2019-12-23 | End: 2019-12-23

## 2019-12-23 RX ORDER — CELECOXIB 200 MG/1
400 CAPSULE ORAL ONCE
Refills: 0 | Status: COMPLETED | OUTPATIENT
Start: 2019-12-23 | End: 2019-12-23

## 2019-12-23 RX ORDER — POLYETHYLENE GLYCOL 3350 17 G/17G
17 POWDER, FOR SOLUTION ORAL DAILY
Refills: 0 | Status: DISCONTINUED | OUTPATIENT
Start: 2019-12-23 | End: 2019-12-24

## 2019-12-23 RX ORDER — GLUCAGON INJECTION, SOLUTION 0.5 MG/.1ML
1 INJECTION, SOLUTION SUBCUTANEOUS ONCE
Refills: 0 | Status: DISCONTINUED | OUTPATIENT
Start: 2019-12-23 | End: 2019-12-24

## 2019-12-23 RX ORDER — MONTELUKAST 4 MG/1
10 TABLET, CHEWABLE ORAL DAILY
Refills: 0 | Status: DISCONTINUED | OUTPATIENT
Start: 2019-12-23 | End: 2019-12-24

## 2019-12-23 RX ORDER — SENNA PLUS 8.6 MG/1
2 TABLET ORAL AT BEDTIME
Refills: 0 | Status: DISCONTINUED | OUTPATIENT
Start: 2019-12-23 | End: 2019-12-24

## 2019-12-23 RX ORDER — ONDANSETRON 8 MG/1
4 TABLET, FILM COATED ORAL EVERY 6 HOURS
Refills: 0 | Status: DISCONTINUED | OUTPATIENT
Start: 2019-12-23 | End: 2019-12-24

## 2019-12-23 RX ORDER — SODIUM CHLORIDE 9 MG/ML
1000 INJECTION INTRAMUSCULAR; INTRAVENOUS; SUBCUTANEOUS
Refills: 0 | Status: DISCONTINUED | OUTPATIENT
Start: 2019-12-23 | End: 2019-12-23

## 2019-12-23 RX ORDER — MEPERIDINE HYDROCHLORIDE 50 MG/ML
12.5 INJECTION INTRAMUSCULAR; INTRAVENOUS; SUBCUTANEOUS
Refills: 0 | Status: DISCONTINUED | OUTPATIENT
Start: 2019-12-23 | End: 2019-12-23

## 2019-12-23 RX ORDER — MAGNESIUM HYDROXIDE 400 MG/1
30 TABLET, CHEWABLE ORAL DAILY
Refills: 0 | Status: DISCONTINUED | OUTPATIENT
Start: 2019-12-23 | End: 2019-12-24

## 2019-12-23 RX ORDER — DEXTROSE 50 % IN WATER 50 %
15 SYRINGE (ML) INTRAVENOUS ONCE
Refills: 0 | Status: DISCONTINUED | OUTPATIENT
Start: 2019-12-23 | End: 2019-12-24

## 2019-12-23 RX ORDER — SODIUM CHLORIDE 9 MG/ML
1000 INJECTION, SOLUTION INTRAVENOUS
Refills: 0 | Status: DISCONTINUED | OUTPATIENT
Start: 2019-12-23 | End: 2019-12-24

## 2019-12-23 RX ORDER — CELECOXIB 200 MG/1
200 CAPSULE ORAL DAILY
Refills: 0 | Status: DISCONTINUED | OUTPATIENT
Start: 2019-12-24 | End: 2019-12-24

## 2019-12-23 RX ORDER — TRAMADOL HYDROCHLORIDE 50 MG/1
50 TABLET ORAL EVERY 4 HOURS
Refills: 0 | Status: DISCONTINUED | OUTPATIENT
Start: 2019-12-23 | End: 2019-12-24

## 2019-12-23 RX ORDER — CHLORHEXIDINE GLUCONATE 213 G/1000ML
1 SOLUTION TOPICAL
Refills: 0 | Status: DISCONTINUED | OUTPATIENT
Start: 2019-12-23 | End: 2019-12-24

## 2019-12-23 RX ORDER — KETOROLAC TROMETHAMINE 30 MG/ML
15 SYRINGE (ML) INJECTION EVERY 6 HOURS
Refills: 0 | Status: COMPLETED | OUTPATIENT
Start: 2019-12-23 | End: 2019-12-24

## 2019-12-23 RX ORDER — GABAPENTIN 400 MG/1
100 CAPSULE ORAL EVERY 8 HOURS
Refills: 0 | Status: DISCONTINUED | OUTPATIENT
Start: 2019-12-23 | End: 2019-12-24

## 2019-12-23 RX ORDER — PANTOPRAZOLE SODIUM 20 MG/1
40 TABLET, DELAYED RELEASE ORAL
Refills: 0 | Status: DISCONTINUED | OUTPATIENT
Start: 2019-12-23 | End: 2019-12-24

## 2019-12-23 RX ORDER — ACETAMINOPHEN 500 MG
650 TABLET ORAL EVERY 6 HOURS
Refills: 0 | Status: DISCONTINUED | OUTPATIENT
Start: 2019-12-23 | End: 2019-12-24

## 2019-12-23 RX ORDER — DEXTROSE 50 % IN WATER 50 %
12.5 SYRINGE (ML) INTRAVENOUS ONCE
Refills: 0 | Status: DISCONTINUED | OUTPATIENT
Start: 2019-12-23 | End: 2019-12-24

## 2019-12-23 RX ORDER — HYDROMORPHONE HYDROCHLORIDE 2 MG/ML
1 INJECTION INTRAMUSCULAR; INTRAVENOUS; SUBCUTANEOUS
Refills: 0 | Status: DISCONTINUED | OUTPATIENT
Start: 2019-12-23 | End: 2019-12-23

## 2019-12-23 RX ORDER — ACETAMINOPHEN 500 MG
1000 TABLET ORAL ONCE
Refills: 0 | Status: COMPLETED | OUTPATIENT
Start: 2019-12-23 | End: 2019-12-23

## 2019-12-23 RX ORDER — GABAPENTIN 400 MG/1
300 CAPSULE ORAL ONCE
Refills: 0 | Status: COMPLETED | OUTPATIENT
Start: 2019-12-23 | End: 2019-12-23

## 2019-12-23 RX ORDER — CEFAZOLIN SODIUM 1 G
2000 VIAL (EA) INJECTION EVERY 8 HOURS
Refills: 0 | Status: COMPLETED | OUTPATIENT
Start: 2019-12-23 | End: 2019-12-23

## 2019-12-23 RX ORDER — DEXTROSE 50 % IN WATER 50 %
25 SYRINGE (ML) INTRAVENOUS ONCE
Refills: 0 | Status: DISCONTINUED | OUTPATIENT
Start: 2019-12-23 | End: 2019-12-24

## 2019-12-23 RX ORDER — ATORVASTATIN CALCIUM 80 MG/1
10 TABLET, FILM COATED ORAL AT BEDTIME
Refills: 0 | Status: DISCONTINUED | OUTPATIENT
Start: 2019-12-23 | End: 2019-12-24

## 2019-12-23 RX ORDER — ASPIRIN/CALCIUM CARB/MAGNESIUM 324 MG
81 TABLET ORAL
Refills: 0 | Status: DISCONTINUED | OUTPATIENT
Start: 2019-12-23 | End: 2019-12-24

## 2019-12-23 RX ORDER — VANCOMYCIN HCL 1 G
1500 VIAL (EA) INTRAVENOUS ONCE
Refills: 0 | Status: DISCONTINUED | OUTPATIENT
Start: 2019-12-23 | End: 2019-12-23

## 2019-12-23 RX ADMIN — GABAPENTIN 300 MILLIGRAM(S): 400 CAPSULE ORAL at 06:29

## 2019-12-23 RX ADMIN — ATORVASTATIN CALCIUM 10 MILLIGRAM(S): 80 TABLET, FILM COATED ORAL at 21:26

## 2019-12-23 RX ADMIN — SODIUM CHLORIDE 150 MILLILITER(S): 9 INJECTION, SOLUTION INTRAVENOUS at 10:30

## 2019-12-23 RX ADMIN — CHLORHEXIDINE GLUCONATE 1 APPLICATION(S): 213 SOLUTION TOPICAL at 15:41

## 2019-12-23 RX ADMIN — Medication 650 MILLIGRAM(S): at 17:22

## 2019-12-23 RX ADMIN — Medication 650 MILLIGRAM(S): at 23:30

## 2019-12-23 RX ADMIN — Medication 15 MILLIGRAM(S): at 21:26

## 2019-12-23 RX ADMIN — Medication 15 MILLIGRAM(S): at 15:45

## 2019-12-23 RX ADMIN — Medication 15 MILLIGRAM(S): at 21:27

## 2019-12-23 RX ADMIN — AMLODIPINE BESYLATE 5 MILLIGRAM(S): 2.5 TABLET ORAL at 15:45

## 2019-12-23 RX ADMIN — PANTOPRAZOLE SODIUM 40 MILLIGRAM(S): 20 TABLET, DELAYED RELEASE ORAL at 15:45

## 2019-12-23 RX ADMIN — Medication 15 MILLIGRAM(S): at 17:25

## 2019-12-23 RX ADMIN — Medication 650 MILLIGRAM(S): at 12:40

## 2019-12-23 RX ADMIN — Medication 81 MILLIGRAM(S): at 17:22

## 2019-12-23 RX ADMIN — CELECOXIB 400 MILLIGRAM(S): 200 CAPSULE ORAL at 06:29

## 2019-12-23 RX ADMIN — Medication 650 MILLIGRAM(S): at 23:29

## 2019-12-23 RX ADMIN — Medication 100 MILLIGRAM(S): at 23:29

## 2019-12-23 RX ADMIN — GABAPENTIN 100 MILLIGRAM(S): 400 CAPSULE ORAL at 21:26

## 2019-12-23 RX ADMIN — Medication 100 MILLIGRAM(S): at 15:40

## 2019-12-23 RX ADMIN — Medication 300 MILLIGRAM(S): at 15:42

## 2019-12-23 RX ADMIN — GABAPENTIN 100 MILLIGRAM(S): 400 CAPSULE ORAL at 15:44

## 2019-12-23 RX ADMIN — MONTELUKAST 10 MILLIGRAM(S): 4 TABLET, CHEWABLE ORAL at 15:41

## 2019-12-23 RX ADMIN — Medication 650 MILLIGRAM(S): at 12:10

## 2019-12-23 RX ADMIN — Medication 1000 MILLIGRAM(S): at 06:29

## 2019-12-23 NOTE — PROGRESS NOTE ADULT - SUBJECTIVE AND OBJECTIVE BOX
Orthopaedics Progress Note    CANDI CASTANON  [id number]    Patient is a 49y year old Male with right hip OA  POD#0 from [facility]  Patient seen and examined bedside  Pain well controlled  Denies fever/chills/CP/SOB  No other complaints    acetaminophen   Tablet .. 650 milliGRAM(s) Oral every 6 hours  aluminum hydroxide/magnesium hydroxide/simethicone Suspension 30 milliLiter(s) Oral four times a day PRN  amLODIPine   Tablet 5 milliGRAM(s) Oral daily  aspirin enteric coated 81 milliGRAM(s) Oral two times a day  atorvastatin 10 milliGRAM(s) Oral at bedtime  ceFAZolin   IVPB 2000 milliGRAM(s) IV Intermittent every 8 hours  chlorhexidine 4% Liquid 1 Application(s) Topical <User Schedule>  dextrose 40% Gel 15 Gram(s) Oral once PRN  dextrose 5%. 1000 milliLiter(s) IV Continuous <Continuous>  dextrose 50% Injectable 12.5 Gram(s) IV Push once  dextrose 50% Injectable 25 Gram(s) IV Push once  dextrose 50% Injectable 25 Gram(s) IV Push once  gabapentin 100 milliGRAM(s) Oral every 8 hours  glucagon  Injectable 1 milliGRAM(s) IntraMuscular once PRN  ketorolac   Injectable 15 milliGRAM(s) IV Push every 6 hours  magnesium hydroxide Suspension 30 milliLiter(s) Oral daily PRN  montelukast 10 milliGRAM(s) Oral daily  ondansetron Injectable 4 milliGRAM(s) IV Push every 6 hours PRN  pantoprazole    Tablet 40 milliGRAM(s) Oral before breakfast  polyethylene glycol 3350 17 Gram(s) Oral daily  senna 2 Tablet(s) Oral at bedtime PRN  sodium chloride 0.9%. 1000 milliLiter(s) IV Continuous <Continuous>  traMADol 50 milliGRAM(s) Oral every 4 hours PRN      T(C): 36.3 (12-23-19 @ 17:00), Max: 36.9 (12-23-19 @ 06:16)  HR: 88 (12-23-19 @ 17:00) (78 - 100)  BP: 107/67 (12-23-19 @ 17:00) (106/68 - 141/85)  RR: 17 (12-23-19 @ 17:00) (12 - 20)  SpO2: 97% (12-23-19 @ 12:40) (97% - 99%)    Physical Exam  NAD, AAOx3  Breathing comfortably on RA  Resting comfortably  RLE  Dressing c/d/i  Motor: TA/EHL/Gastroc intact  Sensory: SP/DP/Gillespie/Sa intact  Vasc: foot WWP, 2+ DP pulse    A/P: Patient is a 49y year old Male as above    WBAT on RLE  DVT ppx: SCD, ASA81 BID  Abx: ancef  Pain control  Home medications: resume  Trend labs  Dispo: Pending PT recommendations

## 2019-12-23 NOTE — ASU PREOP CHECKLIST - 2.
, insulin pump turned off by pt, ROBERT Martinez notified in pre op holdiing, meds given and pt admitted, sent to pre op holding to vivian to speak with MD's

## 2019-12-23 NOTE — PRE-ANESTHESIA EVALUATION ADULT - NSANTHBPHIGHRD_ENT_A_CORE
[facility]   Cardiac Consultation    Referring Provider:  Hyacinth Nelson APRN - CNP     Chief Complaint   Patient presents with   [facility] Establish Cardiologist     ER 07/17/2019 for palps    Palpitations     not as often as before    Shortness of Breath    Dizziness    Fatigue      Subjective: Patient is being seen today for new patient cardiology evaluation; c/o occasional CP and dizziness today    History of Present Illness: Ms Rebekah Lee is a 22 yr old female with no PMH other than tobacco abuse present today for further evaluation of CP and dizziness referred by her PCP. Most recent EKG 7/17/19 Normal sinus rhythm Low voltage. She drinks 1 can caffeine pop per day, denies energy drinks, and smokes marijuana 1 time per week. 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 She c/o dizziness, hot/cold flashes, vision changes and sudden hit of Migraine HA. No significant presyncope or syncope. She reports feeling panic and anxious when it occurred. She knelt down to put head between knees. She then began to feel chest pain while at rest left anterior chest, sharp stabbing  Non radiating, associated dizziness and nausea. Lasting 15 minutes. Describes 2-3 episodes since February of this year. Today she feels fine. She does not do regular exercise but is on her feet most all day. She reports when her heart rate increases it scares her and she feels panicky. Normally walks and does daily activities without any exertional CP or SOB. No recent issues. Past Medical History:   has a past medical history of Ear infection. Surgical History:   has a past surgical history that includes Tympanostomy tube placement. Social History:  Single, works at [employer], no children smoked since age 16 3 ppd down to 2-3 cigs,  reports that she has been smoking cigarettes. She has a 1.50 pack-year smoking history.  She has never used smokeless tobacco. She reports that she drank alcohol. She reports that she has current or past drug history. Drug: Marijuana. Once  Per week. Family History:  MGF  age 48 from [address] hx CHF      Home Medications:  Prior to Admission medications    Medication Sig Start Date End Date Taking? Authorizing Provider   [address] [address] 0.25-35 MG-MCG per tablet Take 1 tablet by mouth daily 7/10/19  Yes Historical Provider, MD        Allergies:  Patient has no known allergies. Review of Systems:   · Constitutional: there has been no unanticipated weight loss. There's been no change in energy level, sleep pattern, or activity level. · Eyes: No visual changes or diplopia. No scleral icterus. · ENT: No Headaches, hearing loss or vertigo. No mouth sores or sore throat. · Cardiovascular: Reviewed in HPI  · Respiratory: No cough or wheezing, no sputum production. No hematemesis. · Gastrointestinal: No abdominal pain, appetite loss, blood in stools. No change in bowel or bladder habits. · Genitourinary: No dysuria, trouble voiding, or hematuria. · Musculoskeletal:  No gait disturbance, weakness or joint complaints. · Integumentary: No rash or pruritis. · Neurological: No headache, diplopia, change in muscle strength, numbness or tingling. No change in gait, balance, coordination, mood, affect, memory, mentation, behavior. · Psychiatric: No anxiety, no depression. · Endocrine: No malaise, fatigue or temperature intolerance. No excessive thirst, fluid intake, or urination. No tremor. · Hematologic/Lymphatic: No abnormal bruising or bleeding, blood clots or swollen lymph nodes. · Allergic/Immunologic: No nasal congestion or hives.     Physical Examination:    Vitals:    19 1420   BP: 122/64   Pulse: 72   SpO2: 99%       Wt Readings from Last 3 Encounters:   19 163 lb (73.9 kg) (89 %, Z= 1.22)*   19 164 lb (74.4 kg) (90 %, Z= 1.25)*   19 164 lb (74.4 kg) (90 %, Z= 1.27)*     * Growth percentiles are based on CDC (Girls, Yes

## 2019-12-23 NOTE — CHART NOTE - NSCHARTNOTEFT_GEN_A_CORE
PACU ANESTHESIA ADMISSION NOTE      ____  Intubated  TV:______       Rate: ______      FiO2: ______    __x__  Patent Airway    __x__  Full return of protective reflexes    __x__  Full recovery from anesthesia / back to baseline status    Vitals:  T(C): 36.9 (12-23-19 @ 07:11), Max: 36.9 (12-23-19 @ 06:16)  HR: 100 (12-23-19 @ 07:11) (100 - 100)  BP: 141/85 (12-23-19 @ 07:11) (141/85 - 141/85)  RR: 20 (12-23-19 @ 07:11) (20 - 20)  SpO2: --    Mental Status:  __x__ Awake   ___x__ Alert   _____ Drowsy   _____ Sedated    Nausea/Vomiting:  __x__ NO  ______Yes,   See Post - Op Orders          Pain Scale (0-10):  __0___    Treatment: ____ None    __x__ See Post - Op/PCA Orders    Post - Operative Fluids:   ____ Oral   __x__ See Post - Op Orders    Plan: Discharge:   ____Home       __x___Floor     _____Critical Care    _____  Other:_________________    Comments: Patient had smooth intraoperative event, no anesthesia complication.  PACU Vital signs: HR: 90           BP:  105      /  63        RR:  16           O2 Sat:  97     %     Temp 36

## 2019-12-23 NOTE — ASU PATIENT PROFILE, ADULT - PSH
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

## 2019-12-24 ENCOUNTER — TRANSCRIPTION ENCOUNTER (OUTPATIENT)
Age: 49
End: 2019-12-24

## 2019-12-24 VITALS
DIASTOLIC BLOOD PRESSURE: 73 MMHG | HEART RATE: 100 BPM | SYSTOLIC BLOOD PRESSURE: 100 MMHG | TEMPERATURE: 99 F | RESPIRATION RATE: 20 BRPM

## 2019-12-24 LAB
ANION GAP SERPL CALC-SCNC: 12 MMOL/L — SIGNIFICANT CHANGE UP (ref 7–14)
BUN SERPL-MCNC: 11 MG/DL — SIGNIFICANT CHANGE UP (ref 10–20)
CALCIUM SERPL-MCNC: 8.6 MG/DL — SIGNIFICANT CHANGE UP (ref 8.5–10.1)
CHLORIDE SERPL-SCNC: 100 MMOL/L — SIGNIFICANT CHANGE UP (ref 98–110)
CO2 SERPL-SCNC: 27 MMOL/L — SIGNIFICANT CHANGE UP (ref 17–32)
CREAT SERPL-MCNC: 1 MG/DL — SIGNIFICANT CHANGE UP (ref 0.7–1.5)
GLUCOSE SERPL-MCNC: 114 MG/DL — HIGH (ref 70–99)
HCT VFR BLD CALC: 36.2 % — LOW (ref 42–52)
HGB BLD-MCNC: 11.9 G/DL — LOW (ref 14–18)
MCHC RBC-ENTMCNC: 31.3 PG — HIGH (ref 27–31)
MCHC RBC-ENTMCNC: 32.9 G/DL — SIGNIFICANT CHANGE UP (ref 32–37)
MCV RBC AUTO: 95.3 FL — HIGH (ref 80–94)
NRBC # BLD: 0 /100 WBCS — SIGNIFICANT CHANGE UP (ref 0–0)
PLATELET # BLD AUTO: 227 K/UL — SIGNIFICANT CHANGE UP (ref 130–400)
POTASSIUM SERPL-MCNC: 4.6 MMOL/L — SIGNIFICANT CHANGE UP (ref 3.5–5)
POTASSIUM SERPL-SCNC: 4.6 MMOL/L — SIGNIFICANT CHANGE UP (ref 3.5–5)
RBC # BLD: 3.8 M/UL — LOW (ref 4.7–6.1)
RBC # FLD: 11.9 % — SIGNIFICANT CHANGE UP (ref 11.5–14.5)
SODIUM SERPL-SCNC: 139 MMOL/L — SIGNIFICANT CHANGE UP (ref 135–146)
WBC # BLD: 8.79 K/UL — SIGNIFICANT CHANGE UP (ref 4.8–10.8)
WBC # FLD AUTO: 8.79 K/UL — SIGNIFICANT CHANGE UP (ref 4.8–10.8)

## 2019-12-24 RX ORDER — CELECOXIB 200 MG/1
1 CAPSULE ORAL
Qty: 28 | Refills: 0
Start: 2019-12-24 | End: 2020-01-06

## 2019-12-24 RX ORDER — ACETAMINOPHEN 500 MG
2 TABLET ORAL
Qty: 0 | Refills: 0 | DISCHARGE
Start: 2019-12-24

## 2019-12-24 RX ORDER — TRAMADOL HYDROCHLORIDE 50 MG/1
1 TABLET ORAL
Qty: 30 | Refills: 0
Start: 2019-12-24 | End: 2019-12-28

## 2019-12-24 RX ORDER — ASPIRIN/CALCIUM CARB/MAGNESIUM 324 MG
1 TABLET ORAL
Qty: 60 | Refills: 0
Start: 2019-12-24 | End: 2020-01-22

## 2019-12-24 RX ORDER — GABAPENTIN 400 MG/1
1 CAPSULE ORAL
Qty: 60 | Refills: 0
Start: 2019-12-24 | End: 2020-01-12

## 2019-12-24 RX ORDER — PANTOPRAZOLE SODIUM 20 MG/1
1 TABLET, DELAYED RELEASE ORAL
Qty: 30 | Refills: 0
Start: 2019-12-24 | End: 2020-01-22

## 2019-12-24 RX ADMIN — GABAPENTIN 100 MILLIGRAM(S): 400 CAPSULE ORAL at 05:42

## 2019-12-24 RX ADMIN — Medication 15 MILLIGRAM(S): at 02:01

## 2019-12-24 RX ADMIN — Medication 650 MILLIGRAM(S): at 05:42

## 2019-12-24 RX ADMIN — PANTOPRAZOLE SODIUM 40 MILLIGRAM(S): 20 TABLET, DELAYED RELEASE ORAL at 05:42

## 2019-12-24 RX ADMIN — Medication 81 MILLIGRAM(S): at 05:42

## 2019-12-24 NOTE — DISCHARGE NOTE NURSING/CASE MANAGEMENT/SOCIAL WORK - PATIENT PORTAL LINK FT
You can access the FollowMyHealth Patient Portal offered by [facility] by registering at the following website: [email or web address]. By joining "[employer] [facility] [employer]"’s FollowMyHealth portal, you will also be able to view your health information using other applications (apps) compatible with our system.

## 2019-12-24 NOTE — DISCHARGE NOTE PROVIDER - NSDCCPCAREPLAN_GEN_ALL_CORE_FT
PRINCIPAL DISCHARGE DIAGNOSIS  Diagnosis: Status post total hip replacement, right  Assessment and Plan of Treatment: remove dressing in 1 week   keep clean and dry.  Can shower in 1 week  if increased pain fever swelling or discharge call md  aspirin 81mg every 12hours for blood clot prevention   rehab weight bearing as tolerated anterior approach  fu dr mariel watson 2 weeks post op   call for appointment

## 2019-12-24 NOTE — DISCHARGE NOTE PROVIDER - NSDCFUADDAPPT_GEN_ALL_CORE_FT
remove dressing in 1 week   keep clean and dry.  Can shower in 1 week  if increased pain fever swelling or discharge call md  aspirin 81mg every 12hours for blood clot prevention   rehab weight bearing as tolerated anterior approach  fu dr mariel watson 2 weeks post op   call for appointment

## 2019-12-24 NOTE — DISCHARGE NOTE PROVIDER - CARE PROVIDER_API CALL
Anam Wheeler)  Orthopaedic Surgery  [address] [place] [address]  Phone: [phone]  Fax: [phone]  Follow Up Time:

## 2019-12-24 NOTE — DISCHARGE NOTE PROVIDER - CARE PROVIDERS DIRECT ADDRESSES
,[email or web address]

## 2019-12-24 NOTE — PROGRESS NOTE ADULT - SUBJECTIVE AND OBJECTIVE BOX
s/p right thr pod 1   pain controlled  no complaints                          11.9   8.79  )-----------( 227      ( 24 Dec 2019 05:31 )             36.2                             11.9   8.79  )-----------( 227      ( 24 Dec 2019 05:31 )             36.2         right hip: aquacell intact  c/d/i   nvid   no calf pain   ipc x 2 in place

## 2019-12-24 NOTE — OCCUPATIONAL THERAPY INITIAL EVALUATION ADULT - TRANSFER SAFETY CONCERNS NOTED: BED/CHAIR, REHAB EVAL
decreased safety awareness/OT cautioned pt. to decrease speed of all ADL's and transfers. fair understanding.

## 2019-12-24 NOTE — DISCHARGE NOTE PROVIDER - NSDCMRMEDTOKEN_GEN_ALL_CORE_FT
acetaminophen 325 mg oral tablet: 2 tab(s) orally every 6 hours  amLODIPine 5 mg oral tablet: 1 tab(s) orally once a day  aspirin 81 mg oral delayed release tablet: 1 tab(s) orally 2 times a day for blood clot prevention   CeleBREX 200 mg oral capsule: 1 cap(s) orally every 12 hours   montelukast 10 mg oral tablet: 1 tab(s) orally once a day  Neurontin 100 mg oral capsule: 1 cap(s) orally every 8 hours  NovoLIN 70/30 subcutaneous suspension: subcutaneous once a day (at bedtime)  pravastatin 40 mg oral tablet: 1 tab(s) orally once a day  Protonix 40 mg oral delayed release tablet: 1 tab(s) orally once a day (before a meal)  Ultram 50 mg oral tablet: 1 tab(s) orally every 4 hours, As needed, Severe Pain (7 - 10) prn severe pain only MDD:6

## 2019-12-29 DIAGNOSIS — M16.11 UNILATERAL PRIMARY OSTEOARTHRITIS, RIGHT HIP: ICD-10-CM

## 2020-01-29 NOTE — PATIENT PROFILE ADULT - BRADEN FRICTION AND SHEAR
Problem: Communication  Goal: The ability to communicate needs accurately and effectively will improve  Note:   Pt is now able to follow commands and nod head appropriately.  Dr. Lazcano, Palliative care, and RN has been in communication with patient's father, brother and sister who live in [place].       Problem: Skin Integrity  Goal: Risk for impaired skin integrity will decrease  Note:   BMS placed yesterday with minimal leakage today.  Chavira from wound care came by to see patient yesterday (see notes)      (3) no apparent problem

## 2020-02-20 PROBLEM — M54.5 LOW BACK PAIN: Chronic | Status: ACTIVE | Noted: 2019-12-03

## 2020-02-20 PROBLEM — M19.90 UNSPECIFIED OSTEOARTHRITIS, UNSPECIFIED SITE: Chronic | Status: ACTIVE | Noted: 2019-12-03

## 2020-05-26 ENCOUNTER — APPOINTMENT (OUTPATIENT)
Dept: SURGERY | Facility: CLINIC | Age: 50
End: 2020-05-26
Payer: COMMERCIAL

## 2020-05-26 VITALS — WEIGHT: 224 LBS | BODY MASS INDEX: 32.07 KG/M2 | HEIGHT: 70 IN

## 2020-05-26 DIAGNOSIS — E66.09 OTHER OBESITY DUE TO EXCESS CALORIES: ICD-10-CM

## 2020-05-26 DIAGNOSIS — M62.08 SEPARATION OF MUSCLE (NONTRAUMATIC), OTHER SITE: ICD-10-CM

## 2020-05-26 PROCEDURE — 99243 OFF/OP CNSLTJ NEW/EST LOW 30: CPT

## 2020-05-26 NOTE — ASSESSMENT
[FreeTextEntry1] : Randal is a pleasant 49-year-old [place]  with a past medical history significant for hypertension, well-controlled asthma, diabetes, arthritis and right hip surgery last year who presents to the office with an enlarging and now tender bulge in the periumbilical region suspicious for a hernia. He was referred to me by his girlfriend who underwent a similar procedure by me in the past. He occasionally does heavy lifting and strenuous activity around the house.\par \par Physical examination demonstrates a plum-sized bulge in the periumbilical region which is tender to palpation and only partially reducible consistent with a large and now symptomatic incarcerated umbilical hernia warranting timely repair. This hernia is complicated by a moderate diastasis recti which is most likely related to his excess abdominal weight. His current BMI is 32.\par \par I explained the pros and cons of surgery, as well as all risks, benefits, indications and alternatives of the procedure and the patient understood and agreed. My office will call Randal in the near future to schedule the repair of his incarcerated umbilical hernia with mesh under local with IV sedation at [facility] with presurgical testing waived.  He was encouraged to avoid heavy lifting and strenuous activity in the interim, of course. We also discussed the importance of calorie restriction and healthy eating with regard to weight loss, hernia recurrence, and his overall health.

## 2020-05-26 NOTE — PHYSICAL EXAM
[JVD] : no jugular venous distention  [Normal Breath Sounds] : Normal breath sounds [No Rash or Lesion] : No rash or lesion [Alert] : alert [Calm] : calm [de-identified] : overweight [de-identified] : normal [de-identified] : protuberant abdomen\par  [de-identified] : . [de-identified] : large incarcerated umbilical hernia

## 2020-05-26 NOTE — CONSULT LETTER
[Dear  ___] : Dear  [unfilled], [Courtesy Letter:] : I had the pleasure of seeing your patient, [unfilled], in my office today. [Please see my note below.] : Please see my note below. [FreeTextEntry3] : Respectfully,\par \par Stefano Miller M.D., FACS\par  [Consult Closing:] : Thank you very much for allowing me to participate in the care of this patient.  If you have any questions, please do not hesitate to contact me.

## 2020-06-30 ENCOUNTER — LABORATORY RESULT (OUTPATIENT)
Age: 50
End: 2020-06-30

## 2020-06-30 ENCOUNTER — OUTPATIENT (OUTPATIENT)
Dept: OUTPATIENT SERVICES | Facility: HOSPITAL | Age: 50
LOS: 1 days | Discharge: HOME | End: 2020-06-30

## 2020-06-30 DIAGNOSIS — Z90.49 ACQUIRED ABSENCE OF OTHER SPECIFIED PARTS OF DIGESTIVE TRACT: Chronic | ICD-10-CM

## 2020-06-30 DIAGNOSIS — Z98.890 OTHER SPECIFIED POSTPROCEDURAL STATES: Chronic | ICD-10-CM

## 2020-06-30 DIAGNOSIS — M26.10 UNSPECIFIED ANOMALY OF JAW-CRANIAL BASE RELATIONSHIP: Chronic | ICD-10-CM

## 2020-07-01 DIAGNOSIS — Z11.59 ENCOUNTER FOR SCREENING FOR OTHER VIRAL DISEASES: ICD-10-CM

## 2020-07-02 NOTE — ASU PATIENT PROFILE, ADULT - PSH
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

## 2020-07-02 NOTE — ASU PATIENT PROFILE, ADULT - PMH
Asthma  no recent asthma attacks as per pt  Diabetes mellitus    Hypertension    Low backache    OA (osteoarthritis)

## 2020-07-03 ENCOUNTER — OUTPATIENT (OUTPATIENT)
Dept: OUTPATIENT SERVICES | Facility: HOSPITAL | Age: 50
LOS: 1 days | Discharge: HOME | End: 2020-07-03

## 2020-07-03 ENCOUNTER — APPOINTMENT (OUTPATIENT)
Dept: SURGERY | Facility: AMBULATORY SURGERY CENTER | Age: 50
End: 2020-07-03
Payer: COMMERCIAL

## 2020-07-03 VITALS
DIASTOLIC BLOOD PRESSURE: 72 MMHG | SYSTOLIC BLOOD PRESSURE: 111 MMHG | OXYGEN SATURATION: 95 % | HEART RATE: 68 BPM | RESPIRATION RATE: 15 BRPM

## 2020-07-03 VITALS
HEIGHT: 70 IN | SYSTOLIC BLOOD PRESSURE: 125 MMHG | RESPIRATION RATE: 18 BRPM | TEMPERATURE: 98 F | OXYGEN SATURATION: 96 % | WEIGHT: 229.94 LBS | HEART RATE: 86 BPM | DIASTOLIC BLOOD PRESSURE: 79 MMHG

## 2020-07-03 DIAGNOSIS — Z96.641 PRESENCE OF RIGHT ARTIFICIAL HIP JOINT: Chronic | ICD-10-CM

## 2020-07-03 DIAGNOSIS — Z90.49 ACQUIRED ABSENCE OF OTHER SPECIFIED PARTS OF DIGESTIVE TRACT: Chronic | ICD-10-CM

## 2020-07-03 DIAGNOSIS — S49.90XA UNSPECIFIED INJURY OF SHOULDER AND UPPER ARM, UNSPECIFIED ARM, INITIAL ENCOUNTER: Chronic | ICD-10-CM

## 2020-07-03 DIAGNOSIS — M26.10 UNSPECIFIED ANOMALY OF JAW-CRANIAL BASE RELATIONSHIP: Chronic | ICD-10-CM

## 2020-07-03 DIAGNOSIS — J34.2 DEVIATED NASAL SEPTUM: Chronic | ICD-10-CM

## 2020-07-03 DIAGNOSIS — Z98.890 OTHER SPECIFIED POSTPROCEDURAL STATES: Chronic | ICD-10-CM

## 2020-07-03 PROCEDURE — 49561: CPT

## 2020-07-03 PROCEDURE — 49568: CPT

## 2020-07-03 RX ORDER — SODIUM CHLORIDE 9 MG/ML
1000 INJECTION, SOLUTION INTRAVENOUS
Refills: 0 | Status: DISCONTINUED | OUTPATIENT
Start: 2020-07-03 | End: 2020-07-18

## 2020-07-03 RX ORDER — TRAMADOL HYDROCHLORIDE 50 MG/1
1 TABLET ORAL
Qty: 30 | Refills: 0
Start: 2020-07-03 | End: 2020-07-07

## 2020-07-03 RX ORDER — HYDROMORPHONE HYDROCHLORIDE 2 MG/ML
0.5 INJECTION INTRAMUSCULAR; INTRAVENOUS; SUBCUTANEOUS
Refills: 0 | Status: DISCONTINUED | OUTPATIENT
Start: 2020-07-03 | End: 2020-07-03

## 2020-07-03 RX ORDER — OXYCODONE HYDROCHLORIDE 5 MG/1
5 TABLET ORAL ONCE
Refills: 0 | Status: DISCONTINUED | OUTPATIENT
Start: 2020-07-03 | End: 2020-07-03

## 2020-07-03 RX ORDER — HYDROMORPHONE HYDROCHLORIDE 2 MG/ML
1 INJECTION INTRAMUSCULAR; INTRAVENOUS; SUBCUTANEOUS
Refills: 0 | Status: DISCONTINUED | OUTPATIENT
Start: 2020-07-03 | End: 2020-07-03

## 2020-07-03 RX ORDER — ONDANSETRON 8 MG/1
4 TABLET, FILM COATED ORAL ONCE
Refills: 0 | Status: DISCONTINUED | OUTPATIENT
Start: 2020-07-03 | End: 2020-07-18

## 2020-07-03 RX ADMIN — SODIUM CHLORIDE 100 MILLILITER(S): 9 INJECTION, SOLUTION INTRAVENOUS at 10:09

## 2020-07-03 NOTE — CHART NOTE - NSCHARTNOTEFT_GEN_A_CORE
PACU ANESTHESIA PACU ADMISSION NOTE      Procedure:Repair of incarcerated ventral hernia with mesh    Post op diagnosisIncarcerated ventral hernia      ____ Intubated  TV:______       Rate: ______      FiO2: ______    ___x_ Patent Airway    __x__ Full return of protective reflexes    ____ Full recovery from anesthesia / sedation to baseline status    Viitals:  see anesthesia record            Mental Status:  ____ Awake   __x___ Alert   _____ Drowsy   _____ Sedated    Nausea/Vomiting: ____ Yes, See Post - Op Orders      _x___ No    Pain Scale (0-10): _____    Treatment: ____ None    x____ See Post - Op/PCA Orders    Post - Operative Fluids:   ____ Oral   ___x_ See Post - Op Orders    Plan:         Discharge:   __x__Home       _____Floor         _____Critical Care    _____Other:_________________    Comments: uneventful perioperative course; no s/s of anesthesia complications noted; D/C home when criteria met

## 2020-07-03 NOTE — PRE-ANESTHESIA EVALUATION ADULT - NSANTHOSAYNRD_GEN_A_CORE
hx. of CHITO, now improved/No. CHITO screening performed.  STOP BANG Legend: 0-2 = LOW Risk; 3-4 = INTERMEDIATE Risk; 5-8 = HIGH Risk

## 2020-07-03 NOTE — ASU DISCHARGE PLAN (ADULT/PEDIATRIC) - FREQUENT HAND WASHING PREVENTS THE SPREAD OF INFECTION.
Not ususally necessary to see ENT : have her come to me on /Friday : 7:30 ; we will decide then Statement Selected

## 2020-07-03 NOTE — ASU DISCHARGE PLAN (ADULT/PEDIATRIC) - CARE PROVIDER_API CALL
Stefano Miller  General Surgery  [address] [place] [id number]  Phone: [phone]  Fax: [phone]  Scheduled Appointment: 07/14/2020

## 2020-07-10 DIAGNOSIS — M19.90 UNSPECIFIED OSTEOARTHRITIS, UNSPECIFIED SITE: ICD-10-CM

## 2020-07-10 DIAGNOSIS — J45.909 UNSPECIFIED ASTHMA, UNCOMPLICATED: ICD-10-CM

## 2020-07-10 DIAGNOSIS — Z96.41 PRESENCE OF INSULIN PUMP (EXTERNAL) (INTERNAL): ICD-10-CM

## 2020-07-10 DIAGNOSIS — I10 ESSENTIAL (PRIMARY) HYPERTENSION: ICD-10-CM

## 2020-07-10 DIAGNOSIS — E10.9 TYPE 1 DIABETES MELLITUS WITHOUT COMPLICATIONS: ICD-10-CM

## 2020-07-10 DIAGNOSIS — K43.0 INCISIONAL HERNIA WITH OBSTRUCTION, WITHOUT GANGRENE: ICD-10-CM

## 2020-07-10 DIAGNOSIS — K42.0 UMBILICAL HERNIA WITH OBSTRUCTION, WITHOUT GANGRENE: ICD-10-CM

## 2020-07-14 ENCOUNTER — APPOINTMENT (OUTPATIENT)
Dept: SURGERY | Facility: CLINIC | Age: 50
End: 2020-07-14
Payer: COMMERCIAL

## 2020-07-14 DIAGNOSIS — K42.0 UMBILICAL HERNIA WITH OBSTRUCTION, W/OUT GANGRENE: ICD-10-CM

## 2020-07-14 PROCEDURE — 99024 POSTOP FOLLOW-UP VISIT: CPT

## 2020-07-14 NOTE — ASSESSMENT
[FreeTextEntry1] : Randal underwent the repair of his large incarcerated infraumbilical ventral hernia with mesh on July 3, 2020 under local with IV sedation without any problems or complications. His wound is clean, dry and intact. There is no evidence of erythema, seroma formation or infection. He is tolerating a diet and having normal bowel movements. He denies any significant postoperative pain or discomfort at this time.\par \par He was counseled and reassured. Randal was discharged from the office with no specific followup necessary, but he knows to avoid any heavy lifting or strenuous activity for the next several weeks.  He was encouraged to continue to wear his abdominal binder for the better part of the next month.  We also discussed the importance of calorie restriction and healthy eating with regard to weight loss, hernia recurrence and his overall health.

## 2020-07-14 NOTE — CONSULT LETTER
[FreeTextEntry1] : Dear Dr. Aleks Rodriguez, \par \par I had the pleasure of seeing your patient, CANDI CASTANON, in my office today. Please see my note below. \par \par Thank you very much for allowing me to participate in the care of this patient. If you have any questions, please do not hesitate to contact me. \par \par \par Respectfully,\par \par Stefano Miller M.D., FACS

## 2021-09-18 NOTE — H&P PST ADULT - NS PRO ABUSE SCREEN AFRAID ANYONE YN
Subjective:   CHIEF COMPLAINT / HPI:  60year old male admitted with sob,increasing o2 requirement. he has cough with difficulty bringing up sputum. He denies any fever or chest pain or hemoptysis. he isw covid positive      Past Medical History:  Past Medical History:   Diagnosis Date    Asthma     COPD (chronic obstructive pulmonary disease) ([facility] [address]     COVID-19 09/14/2021       Past Surgical History:        Procedure Laterality Date    CARDIAC CATHETERIZATION  2013    HERNIA REPAIR      WRIST SURGERY Left 1976       Current Medications:    Current Facility-Administered Medications: HYDROcodone-acetaminophen (NORCO) 5-325 MG per tablet 1 tablet, 1 tablet, Oral, Q12H  dexamethasone (DECADRON) tablet 6 mg, 6 mg, Oral, 2 times per day  ascorbic acid (VITAMIN C) tablet 1,000 mg, 1,000 mg, Oral, BID  zinc sulfate (ZINCATE) capsule 50 mg, 50 mg, Oral, BID  benzonatate (TESSALON) capsule 100 mg, 100 mg, Oral, TID PRN  Vitamin D (CHOLECALCIFEROL) tablet 1,000 Units, 1,000 Units, Oral, Daily  baricitinib (OLUMIANT) tablet 4 mg, 4 mg, Oral, Daily  sodium chloride flush 0.9 % injection 5-40 mL, 5-40 mL, IntraVENous, 2 times per day  sodium chloride flush 0.9 % injection 5-40 mL, 5-40 mL, IntraVENous, PRN  0.9 % sodium chloride infusion, 25 mL, IntraVENous, PRN  enoxaparin (LOVENOX) injection 30 mg, 30 mg, SubCUTAneous, BID  ondansetron (ZOFRAN-ODT) disintegrating tablet 4 mg, 4 mg, Oral, Q8H PRN **OR** ondansetron (ZOFRAN) injection 4 mg, 4 mg, IntraVENous, Q6H PRN  polyethylene glycol (GLYCOLAX) packet 17 g, 17 g, Oral, Daily PRN  acetaminophen (TYLENOL) tablet 650 mg, 650 mg, Oral, Q6H PRN **OR** acetaminophen (TYLENOL) suppository 650 mg, 650 mg, Rectal, Q6H PRN  bisacodyl (DULCOLAX) EC tablet 5 mg, 5 mg, Oral, Daily  guaiFENesin-dextromethorphan (ROBITUSSIN DM) 100-10 MG/5ML syrup 5 mL, 5 mL, Oral, Q4H PRN  albuterol sulfate  (90 Base) MCG/ACT inhaler 2 puff, 2 puff, Inhalation, 4x daily  carvedilol (COREG) tablet 6.25 mg, 6.25 mg, Oral, BID  divalproex (DEPAKOTE ER) extended release tablet 250 mg, 250 mg, Oral, Daily  furosemide (LASIX) tablet 20 mg, 20 mg, Oral, Daily  methocarbamol (ROBAXIN) tablet 500 mg, 500 mg, Oral, Q8H PRN  pantoprazole (PROTONIX) tablet 40 mg, 40 mg, Oral, QAM AC  montelukast (SINGULAIR) tablet 10 mg, 10 mg, Oral, QPM  fluticasone (FLOVENT HFA) 110 MCG/ACT inhaler 2 puff, 2 puff, Inhalation, BID  pravastatin (PRAVACHOL) tablet 40 mg, 40 mg, Oral, Dinner  tiotropium-olodaterol (STIOLTO) 2.5-2.5 MCG/ACT inhaler 2 puff, 2 puff, Inhalation, BID  diclofenac-miSOPROStol (ARTHROTEC 75) 75-0.2 MG per tablet 1 tablet, 1 tablet, Oral, BID    Allergies   Allergen Reactions    Percocet [Oxycodone-Acetaminophen] Nausea And Vomiting       Social History:    Social History     Socioeconomic History    Marital status: Single     Spouse name: None    Number of children: None    Years of education: None    Highest education level: None   Occupational History    None   Tobacco Use    Smoking status: Former Smoker     Packs/day: 0.50     Types: Cigarettes     Start date: 1970     Quit date: 2004     Years since quittin.7    Smokeless tobacco: Never Used   Vaping Use    Vaping Use: Never used   Substance and Sexual Activity    Alcohol use: No    Drug use: No    Sexual activity: Yes     Partners: Female   Other Topics Concern    None   Social History Narrative    None     Social Determinants of Health     Financial Resource Strain:     Difficulty of Paying Living Expenses:    Food Insecurity:     Worried About Running Out of Food in the Last Year:     Ran Out of Food in the Last Year:    Transportation Needs:     Lack of Transportation (Medical):      Lack of Transportation (Non-Medical):    Physical Activity:     Days of Exercise per Week:     Minutes of Exercise per Session:    Stress:     Feeling of Stress :    Social Connections:     Frequency of Communication with Friends and Family:     Frequency of Social Gatherings with Friends and Family:     Attends Anabaptism Services:     Active Member of Clubs or Organizations:     Attends Club or Organization Meetings:     Marital Status:    Intimate Partner Violence:     Fear of Current or Ex-Partner:     Emotionally Abused:     Physically Abused:     Sexually Abused:        Family History:   History reviewed. No pertinent family history. Immunization:    There is no immunization history on file for this patient. REVIEW OF SYSTEMS:    CONSTITUTIONAL:  negative for fevers, chills, diaphoresis, activity change, appetite change, fatigue, night sweats and unexpected weight change. EYES:  negative for blurred vision, eye discharge, visual disturbance and icterus  HEENT:  negative for hearing loss, tinnitus, ear drainage, sinus pressure, nasal congestion, epistaxis and snoring  RESPIRATORY:  See HPI  CARDIOVASCULAR:  negative for chest pain, palpitations, exertional chest pressure/discomfort, edema, syncope  GASTROINTESTINAL:  negative for nausea, vomiting, diarrhea, constipation, blood in stool and abdominal pain  GENITOURINARY:  negative for frequency, dysuria and hematuria  HEMATOLOGIC/LYMPHATIC:  negative for easy bruising, bleeding and lymphadenopathy  ALLERGIC/IMMUNOLOGIC:  negative for recurrent infections, angioedema, anaphylaxis and drug reactions  ENDOCRINE:  negative for weight changes and diabetic symptoms including polyuria, polydipsia and polyphagia    MUSCULOSKELETAL:  negative for  pain, joint swelling, decreased range of motion and muscle weakness  NEUROLOGICAL:  negative for headaches, slurred speech, unilateral weakness  PSYCHIATRIC/BEHAVIORAL: negative for hallucinations, behavioral problems, confusion and agitation.      Objective:   PHYSICAL EXAM:      VITALS:    Vitals:    09/18/21 0556 09/18/21 0811 09/18/21 0842 09/18/21 1155   BP:   130/74    Pulse: 79  85    Resp: 21 21    Temp: 98.4 °F (36.9 °C)  98.1 °F (36.7 °C)    TempSrc: Oral  Oral    SpO2:  (!) 88% (!) 89% (!) 89%   Weight: 270 lb 1 oz (122.5 kg)      Height:             CONSTITUTIONAL:  awake, alert, cooperative, no apparent distress, and appears stated age  NECK:  Supple, symmetrical, trachea midline, no adenopathy, thyroid symmetric, not enlarged and no tenderness  CHEST: Chest expansion equal and symmetrical, no intercostal retraction. LUNGS:  no increased work of breathing, has expiratory wheezes both lungs, no crackles. CARDIOVASCULAR: S1 and S2, no edema and no JVD  ABDOMEN:  normal bowel sounds, non-distended and no masses palpated, and no tenderness to palpation. No hepatospleenomegaly  LYMPHADENOPATHY:  no axillary or supraclavicular adenopathy. No cervical adnenopathy  PSYCHIATRIC: Oriented to person place and time. No obvious depression or anxiety. MUSCULOSKELETAL: No obvious misalignment or effusion of the joints. No clubbing, cyanosis of the digits. RIGHT AND LEFT LOWER EXTREMITIES: No edema, no inflammation, no tenderness. SKIN:  normal skin color, texture, turgor and no redness, warmth, or swelling. No palpable nodules    DATA:         EXAMINATION:   CTA OF THE CHEST 9/14/2021 3:46 pm       TECHNIQUE:   CTA of the chest was performed after the administration of intravenous   contrast.  Multiplanar reformatted images are provided for review.  MIP   images are provided for review.  Dose modulation, iterative reconstruction,   and/or weight based adjustment of the mA/kV was utilized to reduce the   radiation dose to as low as reasonably achievable.       COMPARISON:   None.       HISTORY:   ORDERING SYSTEM PROVIDED HISTORY: abnormal cxr elevated d dimer   TECHNOLOGIST PROVIDED HISTORY:   Reason for exam:->abnormal cxr elevated d dimer   Decision Support Exception - unselect if not a suspected or confirmed   emergency medical condition->Emergency Medical Condition (MA)   Reason for Exam: abnormal cxr elevated d dimer   Acuity: Acute   Type of Exam: Initial   Additional signs and symptoms: Nausea; Fever; Shortness of Breath; Rectal   Bleeding       FINDINGS:   Pulmonary Arteries: Pulmonary arteries are adequately opacified for   evaluation.  No evidence of intraluminal filling defect to suggest pulmonary   embolism.  Main pulmonary artery is normal in caliber.       Mediastinum: No evidence of mediastinal lymphadenopathy.  The heart and   pericardium demonstrate no acute abnormality.  There is no acute abnormality   of the thoracic aorta.       Lungs/pleura: Predominately peripheral ground-glass opacities seen in both   lungs.  No pneumothorax.  No pleural effusion.  Central airways are patent.       Upper Abdomen: Limited images of the upper abdomen are unremarkable.       Soft Tissues/Bones: No acute bone or soft tissue abnormality.           Impression   Negative for acute pulmonary embolism       Predominately peripheral ground-glass opacities in both lungs could represent   COVID pneumonia in the proper clinical setting       .              Assessment:     1. Ac on ch resp failure  2. covid pneumonia  3. Copd  4. curt          Plan:     1. D/w pt  2. Adequate o2 adm  3. Cont airvo  4. bipap as needed  5. Bd rx  6. Decadron  7. On baricitinab  8. Observe closely as may need additional resp support  9.  Thanks will follow no

## 2022-01-27 NOTE — H&P PST ADULT - VENOUS THROMBOEMBOLISM
[Follow-Up Visit] : a follow-up visit for [Patient] : patient [Mother] : mother [Medical Records] : medical records [FreeTextEntry2] : pancytopenia with small PNH clone [Interpreters_IDNumber] : [id number] [Interpreters_FullName] : Eugene [TWNoteComboBox1] : Kenyan no

## 2022-06-16 PROBLEM — J45.909 UNSPECIFIED ASTHMA, UNCOMPLICATED: Chronic | Status: ACTIVE | Noted: 2020-07-03

## 2022-06-21 ENCOUNTER — APPOINTMENT (OUTPATIENT)
Dept: NEUROLOGY | Facility: CLINIC | Age: 52
End: 2022-06-21
Payer: COMMERCIAL

## 2022-06-21 PROCEDURE — 95911 NRV CNDJ TEST 9-10 STUDIES: CPT

## 2022-06-21 PROCEDURE — 95886 MUSC TEST DONE W/N TEST COMP: CPT

## 2022-07-27 ENCOUNTER — APPOINTMENT (OUTPATIENT)
Dept: ORTHOPEDIC SURGERY | Facility: CLINIC | Age: 52
End: 2022-07-27

## 2022-07-27 PROCEDURE — 99214 OFFICE O/P EST MOD 30 MIN: CPT

## 2022-07-27 NOTE — HISTORY OF PRESENT ILLNESS
[de-identified] : Patient comes in with numbness and tingling on the right-hand side.  He says the right side does not feel the same as the left side.  He has constant tingling.  He has nighttime pain but after using the brace that does not bother him.  The left side does not bother him.  He had an EMG NCV recently.

## 2022-07-27 NOTE — ASSESSMENT
[FreeTextEntry1] :   EMG NCV shows moderate right carpal tunnel and mild left carpal tunnel.  The patient has no symptoms on the left side in his  physical exam on left side is negative.  He is going to follow up with me for the left side is needed.  As for the right side The patient was advised of the diagnosis.  The natural history of the pathology was explained in full to the patient in layman's terms. We discussed the nature of the nerve as an electrical cable and what happens to the nerve in carpal tunnel syndrome.  We discussed that treatment for night symptoms included night bracing.  We discussed the possibility of injection when symptoms were intermittent or in patients who were unwilling to undergo surgery with constant symptoms.  We discussed that injection is a diagnostic and therapeutic aide and what this means.  We discussed the use of nerve testing in cases when diagnosis was in doubt or for confirmation to exclude alternate pathology.  We discussed that if symptoms were 24/7 surgery was recommended to give the nerve the best chance to recover but that once symptoms were constant, the nerve may not recover even with surgery.  We discussed that if left alone the nerve progression could worsen and that treatment was indicated to prevent progression of nerve compression.  The longer the nerve is left, the more likely to cause worsening irreversible damage.  All questions were answered.  The risks and benefits of surgical and non-surgical treatment alternatives were explained in full to the patient.\par  we discussed operative and non operative intervention.  He would like to move forward with surgery.  I did discuss with him the sensation may  never fully return 100% but the nighttime pain should stop the extra numbness and tingling should stop.  He would like to move forward with surgery

## 2022-07-27 NOTE — IMAGING
[de-identified] : R hand: \par Tender volar wrist \par Good finger ROM \par +Tinels \par +Phalens \par +Compression test \par Decreased sensation median nerve distribution\par \par

## 2022-07-27 NOTE — H&P PST ADULT - REASON FOR ADMISSION
PT PRESENTS TO PAST WITH NO SOB, CP, PALPITATIONS, DYSURIA, UTI OR URI AT PRESENT.   PT ABLE TO WALK UP 2-3 FLIGHTS OF STEPS WITH NO SOB.  AS PER THE PT, THIS IS HIS/HER COMPLETE MEDICAL AND SURGICAL HX, INCLUDING MEDICATIONS PRESCRIBED AND OVER THE COUNTER  PT PRESENTS TO PAST WITH H/O-   RIGHT HIP OA. Statement Selected

## 2022-07-29 ENCOUNTER — OUTPATIENT (OUTPATIENT)
Dept: OUTPATIENT SERVICES | Facility: HOSPITAL | Age: 52
LOS: 1 days | Discharge: HOME | End: 2022-07-29

## 2022-07-29 VITALS
RESPIRATION RATE: 16 BRPM | HEART RATE: 75 BPM | SYSTOLIC BLOOD PRESSURE: 120 MMHG | DIASTOLIC BLOOD PRESSURE: 78 MMHG | TEMPERATURE: 97 F | OXYGEN SATURATION: 98 % | WEIGHT: 205.03 LBS | HEIGHT: 70 IN

## 2022-07-29 DIAGNOSIS — M26.10 UNSPECIFIED ANOMALY OF JAW-CRANIAL BASE RELATIONSHIP: Chronic | ICD-10-CM

## 2022-07-29 DIAGNOSIS — Z96.641 PRESENCE OF RIGHT ARTIFICIAL HIP JOINT: Chronic | ICD-10-CM

## 2022-07-29 DIAGNOSIS — G56.01 CARPAL TUNNEL SYNDROME, RIGHT UPPER LIMB: ICD-10-CM

## 2022-07-29 DIAGNOSIS — Z01.818 ENCOUNTER FOR OTHER PREPROCEDURAL EXAMINATION: ICD-10-CM

## 2022-07-29 DIAGNOSIS — Z90.49 ACQUIRED ABSENCE OF OTHER SPECIFIED PARTS OF DIGESTIVE TRACT: Chronic | ICD-10-CM

## 2022-07-29 DIAGNOSIS — Z98.890 OTHER SPECIFIED POSTPROCEDURAL STATES: Chronic | ICD-10-CM

## 2022-07-29 DIAGNOSIS — S49.90XA UNSPECIFIED INJURY OF SHOULDER AND UPPER ARM, UNSPECIFIED ARM, INITIAL ENCOUNTER: Chronic | ICD-10-CM

## 2022-07-29 DIAGNOSIS — J34.2 DEVIATED NASAL SEPTUM: Chronic | ICD-10-CM

## 2022-07-29 LAB
A1C WITH ESTIMATED AVERAGE GLUCOSE RESULT: 7.4 % — HIGH (ref 4–5.6)
ALBUMIN SERPL ELPH-MCNC: 4.3 G/DL — SIGNIFICANT CHANGE UP (ref 3.5–5.2)
ALP SERPL-CCNC: 89 U/L — SIGNIFICANT CHANGE UP (ref 30–115)
ALT FLD-CCNC: 45 U/L — HIGH (ref 0–41)
ANION GAP SERPL CALC-SCNC: 14 MMOL/L — SIGNIFICANT CHANGE UP (ref 7–14)
AST SERPL-CCNC: 39 U/L — SIGNIFICANT CHANGE UP (ref 0–41)
BASOPHILS # BLD AUTO: 0.03 K/UL — SIGNIFICANT CHANGE UP (ref 0–0.2)
BASOPHILS NFR BLD AUTO: 0.6 % — SIGNIFICANT CHANGE UP (ref 0–1)
BILIRUB SERPL-MCNC: 0.4 MG/DL — SIGNIFICANT CHANGE UP (ref 0.2–1.2)
BUN SERPL-MCNC: 13 MG/DL — SIGNIFICANT CHANGE UP (ref 10–20)
CALCIUM SERPL-MCNC: 9 MG/DL — SIGNIFICANT CHANGE UP (ref 8.5–10.1)
CHLORIDE SERPL-SCNC: 102 MMOL/L — SIGNIFICANT CHANGE UP (ref 98–110)
CO2 SERPL-SCNC: 22 MMOL/L — SIGNIFICANT CHANGE UP (ref 17–32)
CREAT SERPL-MCNC: 0.9 MG/DL — SIGNIFICANT CHANGE UP (ref 0.7–1.5)
EGFR: 103 ML/MIN/1.73M2 — SIGNIFICANT CHANGE UP
EOSINOPHIL # BLD AUTO: 0.32 K/UL — SIGNIFICANT CHANGE UP (ref 0–0.7)
EOSINOPHIL NFR BLD AUTO: 5.9 % — SIGNIFICANT CHANGE UP (ref 0–8)
ESTIMATED AVERAGE GLUCOSE: 166 MG/DL — HIGH (ref 68–114)
GLUCOSE SERPL-MCNC: 245 MG/DL — HIGH (ref 70–99)
HCT VFR BLD CALC: 38.3 % — LOW (ref 42–52)
HGB BLD-MCNC: 11.9 G/DL — LOW (ref 14–18)
IMM GRANULOCYTES NFR BLD AUTO: 0.4 % — HIGH (ref 0.1–0.3)
LYMPHOCYTES # BLD AUTO: 1.19 K/UL — LOW (ref 1.2–3.4)
LYMPHOCYTES # BLD AUTO: 22 % — SIGNIFICANT CHANGE UP (ref 20.5–51.1)
MCHC RBC-ENTMCNC: 26.6 PG — LOW (ref 27–31)
MCHC RBC-ENTMCNC: 31.1 G/DL — LOW (ref 32–37)
MCV RBC AUTO: 85.7 FL — SIGNIFICANT CHANGE UP (ref 80–94)
MONOCYTES # BLD AUTO: 0.53 K/UL — SIGNIFICANT CHANGE UP (ref 0.1–0.6)
MONOCYTES NFR BLD AUTO: 9.8 % — HIGH (ref 1.7–9.3)
NEUTROPHILS # BLD AUTO: 3.33 K/UL — SIGNIFICANT CHANGE UP (ref 1.4–6.5)
NEUTROPHILS NFR BLD AUTO: 61.3 % — SIGNIFICANT CHANGE UP (ref 42.2–75.2)
NRBC # BLD: 0 /100 WBCS — SIGNIFICANT CHANGE UP (ref 0–0)
PLATELET # BLD AUTO: 278 K/UL — SIGNIFICANT CHANGE UP (ref 130–400)
POTASSIUM SERPL-MCNC: 4.6 MMOL/L — SIGNIFICANT CHANGE UP (ref 3.5–5)
POTASSIUM SERPL-SCNC: 4.6 MMOL/L — SIGNIFICANT CHANGE UP (ref 3.5–5)
PROT SERPL-MCNC: 6.5 G/DL — SIGNIFICANT CHANGE UP (ref 6–8)
RBC # BLD: 4.47 M/UL — LOW (ref 4.7–6.1)
RBC # FLD: 17.2 % — HIGH (ref 11.5–14.5)
SODIUM SERPL-SCNC: 138 MMOL/L — SIGNIFICANT CHANGE UP (ref 135–146)
WBC # BLD: 5.42 K/UL — SIGNIFICANT CHANGE UP (ref 4.8–10.8)
WBC # FLD AUTO: 5.42 K/UL — SIGNIFICANT CHANGE UP (ref 4.8–10.8)

## 2022-07-29 PROCEDURE — 93010 ELECTROCARDIOGRAM REPORT: CPT

## 2022-07-29 NOTE — H&P PST ADULT - NSICDXPASTMEDICALHX_GEN_ALL_CORE_FT
PAST MEDICAL HISTORY:  Asthma no recent asthma attacks as per pt    Diabetes mellitus     H/O carpal tunnel repair     Hypertension     Low backache     OA (osteoarthritis)

## 2022-07-29 NOTE — H&P PST ADULT - NSICDXFAMILYHX_GEN_ALL_CORE_FT
FAMILY HISTORY:  Mother  Still living? Yes, Estimated age: Age Unknown  Family history of osteoarthritis, Age at diagnosis: Age Unknown

## 2022-07-29 NOTE — H&P PST ADULT - REASON FOR ADMISSION
Outpatient Physical Therapy Ortho Treatment Note  [facility]     Patient Name: Ashley Motta  : 1943  MRN: [id number]  Today's Date: 2021      Visit Date: 2021    Visit Dx:    ICD-10-CM ICD-9-CM   1. Primary osteoarthritis of both knees  M17.0 715.16   2. 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 SARAH LIRIANO   • Difficulty walking    • Difficulty walking    • Dry eye syndrome, bilateral    • Eczema 2014   • Elevated fasting glucose    • Endothelial corneal dystrophy 2020   • Genital prolapse 10/2017   • GERD (gastroesophageal reflux disease)    • Goiter, nontoxic, multinodular 2017    THYROID POLYP SEES    • Hemorrhoids    • Herpes zoster 2018   • Hyperlipidemia    • Hypersomnia 2016   • Impaired fasting glucose    • Meralgia paresthetica, left lower limb    • Migraine    • Myopia of both eyes    • Nuclear sclerosis    • BALDEMAR on CPAP     FOLLOWED BY DR. TERRY CHEUNG   • Osteoarthritis    • Osteopenia    • PLMD (periodic limb movement disorder)    • Postmenopausal atrophic vaginitis    • Prediabetes    • Presbyopia    • Rectal nodule 2020    REFERRED TO DR. PAMELA NAPOLES   • Rectocele 2017   • Regular astigmatism of both eyes    • Rheumatoid arthritis (CMS/HCC)    • RLS (restless legs syndrome)    •  Seborrheic keratosis    • Skin cancer 04/2015    LOWER LEG, FOLLOWED BY DR. EDI SANCHEZ   • Urinary incontinence 09/2017   • Vaginal vault prolapse 09/2017   • Vertigo 10/18/2018    ADMITTED TO [facility]   • Vitreoretinal degeneration of both eyes         Past Surgical History:   Procedure Laterality Date   • CATARACT EXTRACTION, BILATERAL Bilateral 2015   • COLONOSCOPY N/A 02/27/2015    1 TUBULAR ADENOMA POLYP IN DISTAL ASCENDING[facility]   • COLONOSCOPY N/A 3/4/2020    10 MM SESSILE SERRATED ADENOMA POLYP IN ASCENDING, 3 MM HYPERPLASTIC POLYP IN RECTUM, 7 MM ANAL NODULE, DR. SARAH LIRIANO AT [facility]   • HYSTERECTOMY N/A 01/19/2004    KAYLEE WITH BSO, DR. RAFIA MORTENSEN AT [facility]   • KNEE ARTHROSCOPY Left 2013   • KNEE ARTHROSCOPY Right 2010   • MELISSA CULDOPLASTY N/A 01/19/2004    DR. RAFIA MORTENSEN AT [facility]   • SACROCOLPOPEXY N/A 01/19/2004    SACROVAGINAL SLING, DR. RAFIA MORTENSEN AT [facility]   • SHOULDER ARTHROSCOPY Left                        PT Assessment/Plan     Row Name 08/18/21 1007          PT Assessment    Assessment Comments  Ms. Motta returns to clinic, no new changes noted ad is scheduled to begin aquatic PT in ~ 1 month. Progressed strengthening this date with increased weight on LAQ and added monster walk and lateral stepping in parallel bars. Pt. cued throughout to maintain slight knee flexion and reduce hip ER/toe out. Introduced balance training with pt. unable to maintain SLS so performed tandem stance with intermittent need of UE to regain balance. Pt. would continue to benefit from PT prior to transition to aquatic to address functional strength and balance.  -        PT Plan    PT Plan Comments  Consider small step up, continnue balance work  -       User Key  (r) = Recorded By, (t) = Taken By, (c) = Cosigned By    Initials Name Provider Type    Bebe Pedersen, PT Physical Therapist            OP Exercises     Row Name 08/18/21 0900             Subjective Comments    Subjective  Comments  I am doing okay, just the normal knee pain  -MH         Subjective Pain    Able to rate subjective pain?  yes  -MH      Pre-Treatment Pain Level  2  -MH         Total Minutes    96167 - PT Therapeutic Exercise Minutes  43  -MH         Exercise 1    Exercise Name 1  LTR  -MH      Cueing 1  Verbal  -MH      Sets 1  1  -MH      Reps 1  10  -MH         Exercise 2    Exercise Name 2  Supine Piriformis/fig 4 stretch  -MH      Cueing 2  Verbal  -MH      Sets 2  --  -MH      Reps 2  3e  -MH      Time 2  :20 sec  -MH         Exercise 3    Exercise Name 3  PPT  -MH      Cueing 3  Verbal  -MH      Sets 3  1  -MH      Reps 3  15  -MH      Time 3  5s hold  -MH         Exercise 4    Exercise Name 4  Seated HS stretch  -MH      Cueing 4  Verbal  -MH      Sets 4  3e  -MH      Time 4  :20 sec hold  -MH         Exercise 5    Exercise Name 5  glute bridge  -MH      Cueing 5  Verbal  -MH      Sets 5  2  -MH      Reps 5  10  -MH      Time 5  cues to push into band slightly to engage glute med  -MH      Additional Comments  RTB  -MH         Exercise 7    Exercise Name 7  LAQ  -MH      Cueing 7  Verbal  -MH      Sets 7  2e  -MH      Reps 7  10  -MH      Additional Comments  3#  -MH         Exercise 8    Exercise Name 8  SL clamshells  -MH      Cueing 8  Verbal;Demo  -MH      Time 8  10 B  -MH      Additional Comments  RTB  -MH         Exercise 9    Exercise Name 9  Nustep, L5  -MH      Cueing 9  Verbal;Demo  -MH      Time 9  5 min  -MH      Additional Comments  UE/LE L5  -MH         Exercise 10    Exercise Name 10  lateral stepping  -MH      Cueing 10  Verbal  -MH      Reps 10  3 laps // bars  -MH      Time 10  RTB at thighs  -MH         Exercise 11    Exercise Name 11  monster walk FW/BW  -MH      Cueing 11  Verbal;Demo  -MH      Reps 11  3 laps // bars  -MH      Time 11  RTB at thighs  -MH         Exercise 12    Exercise Name 12  tandem stance  -MH      Cueing 12  Verbal  -MH      Reps 12  3e  -MH      Time 12  30sec  -MH       Additional Comments  intermittent UE support  -        User Key  (r) = Recorded By, (t) = Taken By, (c) = Cosigned By    Initials Name Provider Type    Bebe Pedersen PT Physical Therapist                       PT OP Goals     Row Name 08/18/21 0900          PT Short Term Goals    STG Date to Achieve  08/27/21  -     STG 1  Pt will report pain rated 2/10 at worst in order to demonstrate ability to return to normalized ADLs and functional activities.  -     STG 1 Progress  Progressing  -     STG 2  Pt will be independent with initial HEP for symptom management.  -     STG 2 Progress  Met  -     STG 3  Pt will demonstrate bilateral :20 sec SL balance to decrease risk of falls in the community.  -     STG 3 Progress  Ongoing  -        Long Term Goals    LTG Date to Achieve  09/27/21  -     LTG 1  Pt will be independent and compliant with advanced HEP for long term management of symptoms and prevention of future occurrence.   -     LTG 1 Progress  Ongoing  -     LTG 2  Pt will reduce level of perceived disability as measured by the KOS  to 70% in order to improve QOL where 100% is no dissability.  -     LTG 2 Progress  Ongoing  -     LTG 3  Pt will report walking 1/2 mile at park with no greater than 3/10 pain in order to improve QOL.  -     LTG 3 Progress  Ongoing  -     LTG 4  Pt will demonstrate bilateral :30 sec SL balance to decrease risk of falls in the community.  -     LTG 4 Progress  Ongoing  -     LTG 5  Pt will demonstrate reciprocal gait pattern descending 5 stairs with no greater than 2/10 pain to ambulate safely in home.  -     LTG 5 Progress  Ongoing  -       User Key  (r) = Recorded By, (t) = Taken By, (c) = Cosigned By    Initials Name Provider Type    Bebe Pedersen PT Physical Therapist          Therapy Education  Education Details: Updated HEP Access Code: [email or web address] Perform tandem balancce in corner with chair support in front for safety; educated on  balance system (visual, vestibular, proprioception components)  Given: HEP, Symptoms/condition management  Program: Reinforced, Progressed  How Provided: Verbal, Demonstration, Written  Provided to: Patient  Level of Understanding: Verbalized, Demonstrated              Time Calculation:   Start Time: 0917  Stop Time: 1000  Time Calculation (min): 43 min  Total Timed Code Minutes- PT: 43 minute(s)  Timed Charges  71766 - PT Therapeutic Exercise Minutes: 43  Total Minutes  Timed Charges Total Minutes: 43   Total Minutes: 43  Therapy Charges for Today     Code Description Service Date Service Provider Modifiers Qty    75294961360 HC PT THER PROC EA 15 MIN 8/18/2021 Bebe Carpenter, PT GP 3                    Bebe Carpenter, PT  8/18/2021      51 yr old male complaining of RT hand discomfort.     PT electing to have Right endoscopic Carpel Tunnel Release Possible open with Dr. Jacome 08/04/22  PT denies any CP, SOB, Palpitations or Dysuria  Pt states he can climb 1+ FOS with no SOB  PT is aware of his Covid testing on 08/01/22 12:50 pm  Pt is aware    NO--Difficult Airway  NO--History of neck surgery or radiation  NO--Limited ROM of neck  NO--History of Malignant hyperthermia  NO--No personal or family history of Pseudocholinesterase deficiency.  NO--Prior Anesthesia Complication  NO--Latex Allergy  NO--Loose teeth  NO--History of Rheumatoid Arthritis  NO--CHITO  NO--Other_____ 51 yr old male complaining of RT hand discomfort.  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 Jacome 08/04/22  PT denies any CP, SOB, Palpitations or Dysuria  Pt states he can climb 1+ FOS with no SOB  PT is aware of his Covid testing on 08/01/22 12:50 pm  Pt is aware to avoid contact with individuals  with Covid  PT aware to contact surgeon for any URI systems and instructions  NO--Difficult Airway  NO--History of neck surgery or radiation  NO--Limited ROM of neck  NO--History of Malignant hyperthermia  NO--No personal or family history of Pseudocholinesterase deficiency.  NO--Prior Anesthesia Complication  NO--Latex Allergy  NO--Loose teeth  NO--History of Rheumatoid Arthritis  NO--CHITO  NO-- bleeding disorder_  PT is aware to contact Dr. Rodriguez for Insulin Pump instructions preoperatively 51 yr old male complaining of RT hand discomfort.  Pt works with [employer] for [employer] has suffered repetitive stress trauma  PT electing to have Right endoscopic Carpel Tunnel Release Possible open with Dr. Jacome 08/04/22 51 yr old male with Hx of IDDm on insulin Pump complaining of RT hand discomfort.  Pt works with [employer] for [employer] has suffered repetitive stress trauma  PT electing to have Right endoscopic Carpel Tunnel Release Possible open with Dr. Jacome 08/04/22

## 2022-07-29 NOTE — H&P PST ADULT - HISTORY OF PRESENT ILLNESS
51 yr old male complaining of RT hand discomfort.  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 Jacome 08/04/22  PT denies any CP, SOB, Palpitations or Dysuria  Pt states he can climb 1+ FOS with no SOB  PT is aware of his Covid testing on 08/01/22 12:50 pm  Pt is aware to avoid contact with individuals  with Covid, or exposure to large groups preoperatively  PT aware to contact surgeon for any URI systems and instructions  NO--Difficult Airway  NO--History of neck surgery or radiation  NO--Limited ROM of neck  NO--History of Malignant hyperthermia  NO--No personal or family history of Pseudocholinesterase deficiency.  NO--Prior Anesthesia Complication  NO--Latex Allergy  NO--Loose teeth  NO--History of Rheumatoid Arthritis  NO--CHITO  NO-- bleeding disorder_  PT is aware to contact Dr. Rodriguez for Insulin Pump instructions preoperatively 51 yr old male with Hx of IDDM on insulin Pump complaining of RT hand discomfort.  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 Jacome 08/04/22  PT denies any CP, SOB, Palpitations or Dysuria  Pt states he can climb 1+ FOS with no SOB  PT is aware of his Covid testing on 08/01/22 12:50 pm  Pt is aware to avoid contact with individuals  with Covid, or exposure to large groups preoperatively  PT aware to contact surgeon for any URI systems and instructions  NO--Difficult Airway  NO--History of neck surgery or radiation  NO--Limited ROM of neck  NO--History of Malignant hyperthermia  NO--No personal or family history of Pseudocholinesterase deficiency.  NO--Prior Anesthesia Complication  NO--Latex Allergy  NO--Loose teeth  NO--History of Rheumatoid Arthritis  NO--CHITO  NO-- bleeding disorder_  PT is aware to contact Dr. Rodriguez for Insulin Pump instructions preoperatively

## 2022-07-29 NOTE — H&P PST ADULT - NSICDXPASTSURGICALHX_GEN_ALL_CORE_FT
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

## 2022-08-01 ENCOUNTER — LABORATORY RESULT (OUTPATIENT)
Age: 52
End: 2022-08-01

## 2022-08-03 NOTE — ASU PATIENT PROFILE, ADULT - FALL HARM RISK - UNIVERSAL INTERVENTIONS
Bed in lowest position, wheels locked, appropriate side rails in place/Call bell, personal items and telephone in reach/Instruct patient to call for assistance before getting out of bed or chair/Non-slip footwear when patient is out of bed/[place] to call system/Physically safe environment - no spills, clutter or unnecessary equipment/Purposeful Proactive Rounding/Room/bathroom lighting operational, light cord in reach

## 2022-08-04 ENCOUNTER — OUTPATIENT (OUTPATIENT)
Dept: OUTPATIENT SERVICES | Facility: HOSPITAL | Age: 52
LOS: 1 days | Discharge: HOME | End: 2022-08-04

## 2022-08-04 ENCOUNTER — TRANSCRIPTION ENCOUNTER (OUTPATIENT)
Age: 52
End: 2022-08-04

## 2022-08-04 ENCOUNTER — APPOINTMENT (OUTPATIENT)
Age: 52
End: 2022-08-04

## 2022-08-04 VITALS
SYSTOLIC BLOOD PRESSURE: 123 MMHG | RESPIRATION RATE: 18 BRPM | TEMPERATURE: 98 F | WEIGHT: 205.03 LBS | HEART RATE: 83 BPM | HEIGHT: 70 IN | DIASTOLIC BLOOD PRESSURE: 70 MMHG | OXYGEN SATURATION: 97 %

## 2022-08-04 VITALS
RESPIRATION RATE: 16 BRPM | OXYGEN SATURATION: 97 % | HEART RATE: 77 BPM | SYSTOLIC BLOOD PRESSURE: 103 MMHG | DIASTOLIC BLOOD PRESSURE: 73 MMHG

## 2022-08-04 DIAGNOSIS — J34.2 DEVIATED NASAL SEPTUM: Chronic | ICD-10-CM

## 2022-08-04 DIAGNOSIS — Z96.641 PRESENCE OF RIGHT ARTIFICIAL HIP JOINT: Chronic | ICD-10-CM

## 2022-08-04 DIAGNOSIS — Z90.49 ACQUIRED ABSENCE OF OTHER SPECIFIED PARTS OF DIGESTIVE TRACT: Chronic | ICD-10-CM

## 2022-08-04 DIAGNOSIS — Z98.890 OTHER SPECIFIED POSTPROCEDURAL STATES: Chronic | ICD-10-CM

## 2022-08-04 DIAGNOSIS — M26.10 UNSPECIFIED ANOMALY OF JAW-CRANIAL BASE RELATIONSHIP: Chronic | ICD-10-CM

## 2022-08-04 DIAGNOSIS — S49.90XA UNSPECIFIED INJURY OF SHOULDER AND UPPER ARM, UNSPECIFIED ARM, INITIAL ENCOUNTER: Chronic | ICD-10-CM

## 2022-08-04 PROCEDURE — 29848 WRIST ENDOSCOPY/SURGERY: CPT | Mod: RT

## 2022-08-04 RX ORDER — SODIUM CHLORIDE 9 MG/ML
1000 INJECTION, SOLUTION INTRAVENOUS
Refills: 0 | Status: DISCONTINUED | OUTPATIENT
Start: 2022-08-04 | End: 2022-08-18

## 2022-08-04 RX ORDER — HYDROMORPHONE HYDROCHLORIDE 2 MG/ML
0.5 INJECTION INTRAMUSCULAR; INTRAVENOUS; SUBCUTANEOUS ONCE
Refills: 0 | Status: DISCONTINUED | OUTPATIENT
Start: 2022-08-04 | End: 2022-08-04

## 2022-08-04 RX ADMIN — SODIUM CHLORIDE 100 MILLILITER(S): 9 INJECTION, SOLUTION INTRAVENOUS at 07:55

## 2022-08-04 NOTE — CHART NOTE - NSCHARTNOTEFT_GEN_A_CORE
PACU ANESTHESIA ADMISSION NOTE      Procedure: right carpal tunnel release   Post op diagnosis:      ____  Intubated  TV:______       Rate: ______      FiO2: ______    __x__  Patent Airway    __x__  Full return of protective reflexes    __x__  Full recovery from anesthesia / back to baseline status    Vitals:  T(C): 36.9 (08-04-22 @ 07:02), Max: 36.9 (08-04-22 @ 06:32)  HR: 83 (08-04-22 @ 07:02) (83 - 83)  BP: 123/70 (08-04-22 @ 07:02) (123/70 - 123/70)  RR: 18 (08-04-22 @ 07:02) (18 - 18)  SpO2: 97% (08-04-22 @ 07:02) (97% - 97%)    Mental Status:  __x__ Awake   ___x__ Alert   _____ Drowsy   _____ Sedated    Nausea/Vomiting:  __x__ NO  ______Yes,   See Post - Op Orders          Pain Scale (0-10):  _____    Treatment: ____ None    __x__ See Post - Op/PCA Orders    Post - Operative Fluids:   ____ Oral   __x__ See Post - Op Orders    Plan: Discharge:   __x__Home       _____Floor     _____Critical Care    _____  Other:_________________    Comments: Patient had smooth intraoperative event, no anesthesia complication.  PACU Vital signs: HR:    82        BP:      85  /     54     RR:    12         O2 Sat:   99    %     Temp 98F

## 2022-08-04 NOTE — ASU PREOP CHECKLIST - INTERNAL PROSTHESES
left shoulder 3 screws, jaw screws/yes(specify)

## 2022-08-08 DIAGNOSIS — I10 ESSENTIAL (PRIMARY) HYPERTENSION: ICD-10-CM

## 2022-08-08 DIAGNOSIS — J45.909 UNSPECIFIED ASTHMA, UNCOMPLICATED: ICD-10-CM

## 2022-08-08 DIAGNOSIS — G56.01 CARPAL TUNNEL SYNDROME, RIGHT UPPER LIMB: ICD-10-CM

## 2022-08-08 DIAGNOSIS — E11.9 TYPE 2 DIABETES MELLITUS WITHOUT COMPLICATIONS: ICD-10-CM

## 2022-08-08 DIAGNOSIS — Z96.641 PRESENCE OF RIGHT ARTIFICIAL HIP JOINT: ICD-10-CM

## 2022-08-12 ENCOUNTER — APPOINTMENT (OUTPATIENT)
Dept: ORTHOPEDIC SURGERY | Facility: CLINIC | Age: 52
End: 2022-08-12

## 2022-08-12 PROBLEM — Z98.890 OTHER SPECIFIED POSTPROCEDURAL STATES: Chronic | Status: ACTIVE | Noted: 2022-07-29

## 2022-08-12 PROCEDURE — 99024 POSTOP FOLLOW-UP VISIT: CPT

## 2022-08-13 NOTE — HISTORY OF PRESENT ILLNESS
[] : Post Surgical Visit: yes [de-identified] : Patient comes in status post right endoscopic carpal tunnel release.  He is doing well.  He still has some tingling but he says the nighttime pain is gone.  He is feeling significantly better.  He does not have complaints today. [de-identified] : 08/04/2022 [de-identified] : right endoscopic carpal tunnel release

## 2022-08-13 NOTE — PHYSICAL EXAM
[de-identified] :   Right hand:  wrist incision clean dry and intact mild maceration sutures removed sensation unchanged

## 2022-08-13 NOTE — ASSESSMENT
[FreeTextEntry1] :  status post right endoscopic carpal tunnel release.  The patient is doing well.  He is going to put a Band-Aid over the area of the wound and let it dry out as it has a little bit of maceration.  As a starts the scab up he is going to put either bacitracin or Neosporin on the wound very light thin layer and wound then leave it open at night.  Once the scab and wound heals over he will start scar massage with vitamin E or cocoa butter.  I will see him back in about 3 weeks.  If he has any problems he will call.

## 2022-09-06 ENCOUNTER — APPOINTMENT (OUTPATIENT)
Dept: ORTHOPEDIC SURGERY | Facility: CLINIC | Age: 52
End: 2022-09-06

## 2022-09-06 DIAGNOSIS — G56.01 CARPAL TUNNEL SYNDROME, RIGHT UPPER LIMB: ICD-10-CM

## 2022-09-06 PROCEDURE — 99024 POSTOP FOLLOW-UP VISIT: CPT

## 2022-09-06 NOTE — PHYSICAL EXAM
[de-identified] :   Right hand:    Incision site healed, slight be tender palpation along pillar area, full range of motion of fingers, neurovascularly intact

## 2022-09-06 NOTE — HISTORY OF PRESENT ILLNESS
[] : Post Surgical Visit: yes [de-identified] : Patient comes in status post right endoscopic carpal tunnel release.  He is doing well.He says he rarely has tingling.  He is doing well.  He has been working on scar massage. [de-identified] : 08/04/2022 [de-identified] : right endoscopic carpal tunnel release

## 2022-09-06 NOTE — ASSESSMENT
[FreeTextEntry1] :  patient is status post endoscopic carpal tunnel release.  He is doing well.  He has no pain numbness and tingling.  He will follow up with me as needed.  He will continue scar massage.

## 2022-09-07 ENCOUNTER — TRANSCRIPTION ENCOUNTER (OUTPATIENT)
Age: 52
End: 2022-09-07

## 2022-11-14 NOTE — ED PROVIDER NOTE - CROS ED CONS ALL NEG
[facility]  [address]  Dept: [phone]  Dept Fax: [phone]: [phone]    Visit Date: 11/14/2022    Mr. Klaudia Verma is a 60 y.o.male  who presented for:   Bilateral Iliac artery stents for severe claudication      HPI:   HPI     Mr. Klaudia eVrma returns to clinic for followup after aortogram, left leg angiogram, and left SFA-Popliteal atherectomy and balloon angioplasty on 10/3/22. He is on ASA/Plavix without any problems    At the time of the procedure, he was noted to have severe bilateral Iliac artery occlusive disease, with 90% bilateral Common Iliac and 99% right external Iliac stenoses. These areas had to angioplastied just to place the working sheath up and over during the left SFA-Pop intervention. He states that his left leg is warmer and stronger, but not back to normal. The left foot wound is smaller and healing according to patient. Current Outpatient Medications:     sucralfate (CARAFATE) 1 GM tablet, Take 1 g by mouth 4 times daily, Disp: , Rfl:     collagenase 250 UNIT/GM ointment, Apply topically daily Apply topically daily to left foot wound on dorsum of foot., Disp: , Rfl:     acetaminophen (TYLENOL) 325 MG tablet, Take 650 mg by mouth every 6 hours as needed for Pain, Disp: , Rfl:     gabapentin (NEURONTIN) 300 MG capsule, Take 300 mg by mouth 3 times daily. , Disp: , Rfl:     clopidogrel (PLAVIX) 75 MG tablet, Take 75 mg by mouth daily, Disp: , Rfl:     aspirin 81 MG EC tablet, Take by mouth, Disp: , Rfl:     cyclobenzaprine (FLEXERIL) 10 MG tablet, Take by mouth, Disp: , Rfl:     lidocaine (LIDODERM) 5 %, Apply topically, Disp: , Rfl:     Menthol, Topical Analgesic, 4 % GEL, Apply topically, Disp: , Rfl:     lisinopril (PRINIVIL;ZESTRIL) 5 MG tablet, Take 1 tablet by mouth daily, Disp: 30 tablet, Rfl: 0    melatonin 3 MG TABS tablet, Take 2 tablets by mouth nightly as needed (sleep), Disp: 10 tablet, Rfl: 0    pantoprazole (PROTONIX) 20 MG tablet, Take 2 tablets by mouth daily for 30 doses, Disp: 60 tablet, Rfl: 0    thiamine 100 MG tablet, Take 1 tablet by mouth daily, Disp: 30 tablet, Rfl: 0    Multiple Vitamin (MULTIVITAMIN) TABS tablet, Take 1 tablet by mouth daily, Disp: 30 tablet, Rfl: 0    No Known Allergies    Past Medical History  Mliss Lady  has a past medical history of Alcoholic hepatitis without ascites, Anemia, unspecified, Anxiety disorder, Arthritis, Atherosclerosis of native arteries of left leg with ulceration of other part of foot (HCC), Chronic GERD, Cocaine abuse without complication ([address] 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, subsequent encounter ([address] Difficulty in walking, not elsewhere classified, Family history of tobacco abuse and dependence, Folate-deficiency anemia, Frostbite with tissue necrosis of right foot, subsequent encounter, Gangrene ([address] Gastroesophageal reflux disease without esophagitis, Hyperglycemia, Hyperlipemia, Hypo-osmolality and hyponatremia, Infection of amputation stump, unspecified extremity ([address] Inflammatory polyneuropathy ([address] Major depressive disorder, recurrent episode, moderate ([address] Muscle weakness (generalized), Other idiopathic peripheral autonomic neuropathy, Other injury of unspecified body region, initial encounter, Peripheral vascular angioplasty status, Peripheral vascular disease, unspecified ([address] Phantom limb syndrome with pain ([address] Primary hypertension, Pseudomonas (aeruginosa) (mallei) (pseudomallei) as the cause of diseases classified elsewhere, Severe sepsis without septic shock ([address] Substance induced mood disorder ([address] Superficial frostbite of right foot, initial encounter, Tobacco abuse, Uncomplicated alcohol abuse, Unilateral osteoarthritis of hip, right, Unilateral recurrent inguinal hernia with obstruction but no gangrene, Unspecified soft tissue disorder related to use, overuse and pressure, right ankle and foot, and Unsteadiness on feet. Social History  Kee Tariq  reports that he has been smoking cigarettes. He has a 47.00 pack-year smoking history. He has never used smokeless tobacco. He reports current alcohol use. He reports that he does not currently use drugs after having used the following drugs: Cocaine. Frequency: 1.00 time per week. Family History  Kee Tariq family history includes Cancer in his father; Heart Attack in his mother. There is no family history of bicuspid aortic valve, aneurysms, heart transplant, pacemakers, defibrillators, or sudden cardiac death. Past Surgical History   Past Surgical History:   Procedure Laterality Date    LEG SURGERY Right     aka       Subjective:     Review of Systems  See HPI    Objective: There were no vitals taken for this visit.     Wt Readings from Last 3 Encounters:   10/24/22 155 lb (70.3 kg)   10/03/22 154 lb (69.9 kg)   09/29/22 154 lb (69.9 kg)     BP Readings from Last 3 Encounters:   11/07/22 (!) 156/76   10/24/22 (!) 141/70   10/03/22 129/63       Physical Exam    Right femoral access site: clean/dry/ nontender; no hematoma  Left lower extremity: foot warm, +DP pulse; no PT pulse      Lab Results   Component Value Date/Time    WBC 9.5 10/03/2022 12:02 PM    RBC 4.86 10/03/2022 12:02 PM    HGB 13.0 10/03/2022 12:02 PM    HCT 39.6 10/03/2022 12:02 PM    MCV 81.5 10/03/2022 12:02 PM    MCH 26.7 10/03/2022 12:02 PM    MCHC 32.8 10/03/2022 12:02 PM    RDW 13.7 06/27/2017 07:34 AM     10/03/2022 12:02 PM    MPV 8.9 10/03/2022 12:02 PM       Lab Results   Component Value Date/Time     01/23/2022 05:47 AM    K 3.5 01/23/2022 05:47 AM    K 3.2 01/11/2022 06:19 AM     01/23/2022 05:47 AM    CO2 19 01/23/2022 05:47 AM    BUN 3 01/23/2022 05:47 AM    LABALBU 2.4 01/15/2022 05:41 AM    CREATININE 0.5 10/03/2022 12:02 PM    CALCIUM 8.4 01/23/2022 05:47 AM    LABGLOM >90 10/03/2022 12:02 PM    GLUCOSE 107 01/23/2022 05:47 AM       Lab Results   Component Value Date/Time    ALKPHOS 35 01/15/2022 05:41 AM    ALT 26 01/15/2022 05:41 AM    AST 39 01/15/2022 05:41 AM    PROT 5.6 01/15/2022 05:41 AM    BILITOT 0.5 01/15/2022 05:41 AM    BILIDIR 0.6 01/10/2022 02:30 PM    LABALBU 2.4 01/15/2022 05:41 AM       Lab Results   Component Value Date/Time    MG 1.4 01/20/2022 11:14 AM       Lab Results   Component Value Date    INR 1.00 10/03/2022    INR 1.56 (H) 01/12/2022         No results found for: LABA1C    No results found for: TRIG, HDL, LDLCALC, LDLDIRECT, LABVLDL    Lab Results   Component Value Date/Time    TSH 1.650 06/27/2017 07:34 AM           Assessment/Plan     1. PVD (peripheral vascular disease) ([facility]      No orders of the defined types were placed in this encounter. Good result from left leg angiogram and revascularization of the left SFA-Popliteal arteries. The patient has bilateral iliac artery disease and the anatomy is conducive for endovascular repair with bilateral iliac artery stents. Plan:   Continue ASA/Plavix. Scheduled aortogram, bilateral iliac angiograms, IVUS, and possible endovascular intervention on 11/14/22. I had a discussion in the office with the patient about his diagnosis, the expected procedure involved, and all risks/benefits/alternatives and the possible complications. After this discussion, the patient understood all risks and wished to proceed. No follow-ups on file.       Electronically signed by Tiana Gaucher, MD   11/13/2022 at 8:39 PM EDT negative...

## 2022-12-22 ENCOUNTER — APPOINTMENT (OUTPATIENT)
Dept: GASTROENTEROLOGY | Facility: CLINIC | Age: 52
End: 2022-12-22

## 2022-12-22 VITALS — WEIGHT: 192 LBS | BODY MASS INDEX: 27.49 KG/M2 | HEIGHT: 70 IN

## 2022-12-22 DIAGNOSIS — D64.9 ANEMIA, UNSPECIFIED: ICD-10-CM

## 2022-12-22 PROCEDURE — 99203 OFFICE O/P NEW LOW 30 MIN: CPT

## 2022-12-22 RX ORDER — POLYETHYLENE GLYCOL 3350 AND ELECTROLYTES WITH LEMON FLAVOR 236; 22.74; 6.74; 5.86; 2.97 G/4L; G/4L; G/4L; G/4L; G/4L
236 POWDER, FOR SOLUTION ORAL ONCE
Qty: 2000 | Refills: 0 | Status: ACTIVE | COMMUNITY
Start: 2022-12-22 | End: 1900-01-01

## 2022-12-28 NOTE — HISTORY OF PRESENT ILLNESS
[FreeTextEntry1] : 52 y.o M w/ hx of DM, DL, HTN, asthma, and recent diagnosis of celiac disease, referred for capsule endoscopy.\par \par Pt was sent by Dr. Dunham after a recent diagnosis of celiac disease. \par He was on a regular diet and used to have 4-5 BMs/d before undergoing workup which revealed celiac after which he was switched to GF diet and decreased to 1-2 BMs.\par He is  doing well otherwise and has no remarkable unintentional weight loss.\par He denied night sweats or fevers. \par He denies abd pain, has no n/v/d. no dysphagia or odynophagia. \par \par ROS otherwise negative.\par \par PMHx: DM, DL, HTN, asthma\par PSHx: umbilical hernia repair, L shoulder repair, appendectomy, deviated septum sx, R carpal tunnel surgery\par Social hx: no smoking, no substance use, social alcohol use\par Allergies: none\par Fam hx: father w/ prostate ca

## 2022-12-28 NOTE — PHYSICAL EXAM
[Normal Appearance] : the appearance of the neck was normal [No Respiratory Distress] : no respiratory distress [No Acc Muscle Use] : no accessory muscle use [Respiration, Rhythm And Depth] : normal respiratory rhythm and effort [None] : no edema [Normal] : normal bowel sounds, non-tender, no masses, soft, no no hepato-splenomegaly [Abnormal Walk] : normal gait [Normal Color / Pigmentation] : normal skin color and pigmentation [No Focal Deficits] : no focal deficits [Oriented To Time, Place, And Person] : oriented to person, place, and time

## 2023-01-09 ENCOUNTER — APPOINTMENT (OUTPATIENT)
Dept: NEUROLOGY | Facility: CLINIC | Age: 53
End: 2023-01-09

## 2023-01-16 ENCOUNTER — TRANSCRIPTION ENCOUNTER (OUTPATIENT)
Age: 53
End: 2023-01-16

## 2023-01-16 ENCOUNTER — OUTPATIENT (OUTPATIENT)
Dept: OUTPATIENT SERVICES | Facility: HOSPITAL | Age: 53
LOS: 1 days | Discharge: HOME | End: 2023-01-16
Payer: COMMERCIAL

## 2023-01-16 VITALS
WEIGHT: 190.04 LBS | TEMPERATURE: 97 F | DIASTOLIC BLOOD PRESSURE: 61 MMHG | SYSTOLIC BLOOD PRESSURE: 108 MMHG | HEART RATE: 84 BPM | HEIGHT: 70 IN | RESPIRATION RATE: 18 BRPM

## 2023-01-16 DIAGNOSIS — Z98.890 OTHER SPECIFIED POSTPROCEDURAL STATES: Chronic | ICD-10-CM

## 2023-01-16 DIAGNOSIS — M26.10 UNSPECIFIED ANOMALY OF JAW-CRANIAL BASE RELATIONSHIP: Chronic | ICD-10-CM

## 2023-01-16 DIAGNOSIS — J34.2 DEVIATED NASAL SEPTUM: Chronic | ICD-10-CM

## 2023-01-16 DIAGNOSIS — Z96.641 PRESENCE OF RIGHT ARTIFICIAL HIP JOINT: Chronic | ICD-10-CM

## 2023-01-16 DIAGNOSIS — D64.9 ANEMIA, UNSPECIFIED: ICD-10-CM

## 2023-01-16 DIAGNOSIS — Z90.49 ACQUIRED ABSENCE OF OTHER SPECIFIED PARTS OF DIGESTIVE TRACT: Chronic | ICD-10-CM

## 2023-01-16 DIAGNOSIS — S49.90XA UNSPECIFIED INJURY OF SHOULDER AND UPPER ARM, UNSPECIFIED ARM, INITIAL ENCOUNTER: Chronic | ICD-10-CM

## 2023-01-16 PROCEDURE — 91110 GI TRC IMG INTRAL ESOPH-ILE: CPT | Mod: 26

## 2023-01-16 NOTE — ASU DISCHARGE PLAN (ADULT/PEDIATRIC) - NS MD DC FALL RISK RISK
For information on Fall & Injury Prevention, visit: [email or web address] OR  [email or web address] OR  https://www.cdc.gov/steadi/patient.html

## 2023-02-16 ENCOUNTER — APPOINTMENT (OUTPATIENT)
Dept: GASTROENTEROLOGY | Facility: CLINIC | Age: 53
End: 2023-02-16
Payer: COMMERCIAL

## 2023-02-16 DIAGNOSIS — K90.0 CELIAC DISEASE: ICD-10-CM

## 2023-02-16 DIAGNOSIS — R11.0 NAUSEA: ICD-10-CM

## 2023-02-16 PROCEDURE — 99213 OFFICE O/P EST LOW 20 MIN: CPT | Mod: 95

## 2023-02-16 NOTE — PHYSICAL EXAM
[Normal] : normal hearing, normal lips.gums, normal oropharynx [Normal Appearance] : the appearance of the neck was normal [No Respiratory Distress] : no respiratory distress [No Acc Muscle Use] : no accessory muscle use [Respiration, Rhythm And Depth] : normal respiratory rhythm and effort [None] : no edema [Abnormal Walk] : normal gait [Normal Color / Pigmentation] : normal skin color and pigmentation [No Focal Deficits] : no focal deficits [Oriented To Time, Place, And Person] : oriented to person, place, and time

## 2023-02-16 NOTE — ASSESSMENT
[FreeTextEntry1] : 52 y.o M w/ hx of DM, DL, HTN, asthma, and recent diagnosis of celiac disease, here for f/u after capsule endoscopy.\par \par no new symptoms since last visit. \par compliant w/ GF diet.\par \par #celiac disease\par pt recently diagnosed w/ Dr. Dunham\par referred for capsule endoscopy to r/o additional SB lesions and ulcerative jejunitis (pt has no concerning B-symptoms to suspect SB lymphoma)\par s/p Capsule endoscopy performed on 1/6/2023 was normal with no lesions noted and no bleeding seen\par \par -c/w GF diet\par -f/u w/ Dr. Dunham for the rest of his GI care\par \par #occasional nausea\par could be from GERD but ddx also includes gastroparesis, PUD or functional dyspepsia\par pt reassured given the absence of alarm features and recent endoscopic eval\par -trial of famotidine daily PRN for now\par -will f/u w/ primary GI\par \par # + FOBT\par per pt he had good quality EGD/colonoscopy within the past year which were unremarkable.\par he has no alarm symptoms. \par given recent endoscopic eval, low risk of GI pathology but will defer decision on further endoscopic eval to primary GI Dr. Dunham.\par \par pt to f/u w/ primary GI Dr. Dunham for the rest of his GI care.\par f/u w/ us as needed.

## 2023-02-16 NOTE — HISTORY OF PRESENT ILLNESS
[Home] : at home, [unfilled] , at the time of the visit. [Medical Office: ([facility])___] : at the medical office located in  [FreeTextEntry1] : 52 y.o M w/ hx of DM, DL, HTN, asthma, and recent diagnosis of celiac disease, here for f/u after capsule endoscopy.\par \par Pt was sent by Dr. Dunham after a recent diagnosis of celiac disease. He was on a regular diet and used to have 4-5 BMs/d before undergoing workup which revealed celiac after which he was switched to GF diet and decreased to 1-2 BMs.\par \par Interval history since last visit is remarkable for biceps repair surgery.\par Otherwise, patient reports occasional nausea mainly after large meals.  He states that nausea is worse after dinner. Nausea started around 3 months ago and he only had 2 episodes of vomiting since then.\par No other alarm features like weight loss or appetite changes, dysphagia odynophagia, fevers, chills, night sweats, or unintentional weight loss.\par He denies abd pain, has no diarrhea or change in BMs, no melena or BRBPR. no dysphagia or odynophagia. \par \par He had a recent outpatient fecal occult blood test which came back positive however, patient reports having EGD and colonoscopy within the past year that were both unremarkable.\par \par Capsule endoscopy performed on 1/6/2023 was normal with no lesions noted and no bleeding seen.  \par \par ROS otherwise negative.\par \par PMHx: DM, DL, HTN, asthma\par PSHx: umbilical hernia repair, L shoulder repair, appendectomy, deviated septum sx, R carpal tunnel surgery\par Social hx: no smoking, no substance use, social alcohol use\par Allergies: none\par Fam hx: father w/ prostate ca [de-identified] : 01/16/23

## 2023-04-29 NOTE — ASSESSMENT
[FreeTextEntry1] : 52 y.o M w/ hx of DM, DL, HTN, asthma, and recent diagnosis of celiac disease, referred for capsule endoscopy.\par \par #celiac disease\par pt recently diagnosed w/ Dr. Dunham\par referred for capsule endoscopy to r/o additional SB lesions and ulcerative jejunitis (less likely as pt has no concerning B-symptoms to suspect SB lymphoma)\par \par -capsule endoscopy to be scheduled\par -prep instructions given to pt including breakfast, CLD diet after and 2L golytely on d prior to procedure\par -f/u after capsule is done
oral

## 2023-05-15 ENCOUNTER — RX RENEWAL (OUTPATIENT)
Age: 53
End: 2023-05-15

## 2023-05-15 RX ORDER — FAMOTIDINE 40 MG/1
40 TABLET, FILM COATED ORAL DAILY
Qty: 90 | Refills: 0 | Status: ACTIVE | COMMUNITY
Start: 2023-02-16 | End: 1900-01-01

## 2023-07-19 NOTE — ED PROVIDER NOTE - CLINICAL SUMMARY MEDICAL DECISION MAKING FREE TEXT BOX
We removed the insulin pump in the ED. Pt is unsafe for DC. Will admit for frequent FS and further management care. Pt admitted in stable condition. MEDICINE

## 2024-03-25 ENCOUNTER — APPOINTMENT (OUTPATIENT)
Dept: OTOLARYNGOLOGY | Facility: CLINIC | Age: 54
End: 2024-03-25
Payer: COMMERCIAL

## 2024-03-25 VITALS — HEIGHT: 70 IN | WEIGHT: 192 LBS | BODY MASS INDEX: 27.49 KG/M2

## 2024-03-25 DIAGNOSIS — K14.8 OTHER DISEASES OF TONGUE: ICD-10-CM

## 2024-03-25 PROCEDURE — 41100 BIOPSY OF TONGUE: CPT

## 2024-03-25 PROCEDURE — 99204 OFFICE O/P NEW MOD 45 MIN: CPT | Mod: 25

## 2024-03-25 NOTE — PHYSICAL EXAM
[Normal] : mucosa is normal [Midline] : trachea located in midline position [Leukoplakia] : leukoplakia [de-identified] : Lichen planus. Leukoplakia bilateral lateral tongue. LEukoerythroplakia x2 right lateral tongue, worse anterior with bx changes

## 2024-04-05 ENCOUNTER — APPOINTMENT (OUTPATIENT)
Dept: OTOLARYNGOLOGY | Facility: CLINIC | Age: 54
End: 2024-04-05
Payer: COMMERCIAL

## 2024-04-05 DIAGNOSIS — L43.8 OTHER LICHEN PLANUS: ICD-10-CM

## 2024-04-05 LAB — CORE LAB BIOPSY: NORMAL

## 2024-04-05 PROCEDURE — 99215 OFFICE O/P EST HI 40 MIN: CPT

## 2024-04-05 PROCEDURE — G2211 COMPLEX E/M VISIT ADD ON: CPT

## 2024-04-05 NOTE — HISTORY OF PRESENT ILLNESS
[SMR] : submucous resection (SMR) [de-identified] : Oncology Summary Stage: T1N0Mx Site: Right oral tongue Pathology: Squamous cell carcinoma Treatment: None yet Disease status: Active Swallow: Normal Airway: Normal Smoking: Never smoker Thyroid:  Other history: W[employer] exposure, oral lichen planus  [FreeTextEntry1] : Patient presents today c/o tongue lesion. He states he is here today to discuss results of his biopsy. No new symptoms.

## 2024-04-05 NOTE — PHYSICAL EXAM
[Normal] : mucosa is normal [Midline] : trachea located in midline position [Leukoplakia] : leukoplakia [de-identified] : Lichen planus. Leukoplakia bilateral lateral tongue. LEukoerythroplakia x2 right lateral tongue, worse anterior with bx changes

## 2024-04-05 NOTE — ASSESSMENT
[FreeTextEntry1] : Path reviewed consistent with SCC  Plan for transoral glossectomy and CO2 laser ablation of LP lesions along right oral tongue Staging scan ordered   Special risks of cancer surgery were also discussed including tumor recurrence, either locoregionally or metastatic, as well as death despite best treatment

## 2024-04-17 ENCOUNTER — RESULT REVIEW (OUTPATIENT)
Age: 54
End: 2024-04-17

## 2024-04-17 ENCOUNTER — OUTPATIENT (OUTPATIENT)
Dept: OUTPATIENT SERVICES | Facility: HOSPITAL | Age: 54
LOS: 1 days | End: 2024-04-17
Payer: COMMERCIAL

## 2024-04-17 DIAGNOSIS — Z00.8 ENCOUNTER FOR OTHER GENERAL EXAMINATION: ICD-10-CM

## 2024-04-17 DIAGNOSIS — J34.2 DEVIATED NASAL SEPTUM: Chronic | ICD-10-CM

## 2024-04-17 DIAGNOSIS — Z96.641 PRESENCE OF RIGHT ARTIFICIAL HIP JOINT: Chronic | ICD-10-CM

## 2024-04-17 DIAGNOSIS — I25.10 ATHEROSCLEROTIC HEART DISEASE OF NATIVE CORONARY ARTERY WITHOUT ANGINA PECTORIS: ICD-10-CM

## 2024-04-17 DIAGNOSIS — Z90.49 ACQUIRED ABSENCE OF OTHER SPECIFIED PARTS OF DIGESTIVE TRACT: Chronic | ICD-10-CM

## 2024-04-17 DIAGNOSIS — S49.90XA UNSPECIFIED INJURY OF SHOULDER AND UPPER ARM, UNSPECIFIED ARM, INITIAL ENCOUNTER: Chronic | ICD-10-CM

## 2024-04-17 DIAGNOSIS — Z98.890 OTHER SPECIFIED POSTPROCEDURAL STATES: Chronic | ICD-10-CM

## 2024-04-17 DIAGNOSIS — M26.10 UNSPECIFIED ANOMALY OF JAW-CRANIAL BASE RELATIONSHIP: Chronic | ICD-10-CM

## 2024-04-17 PROCEDURE — 75574 CT ANGIO HRT W/3D IMAGE: CPT

## 2024-04-17 PROCEDURE — 75574 CT ANGIO HRT W/3D IMAGE: CPT | Mod: 26

## 2024-04-18 DIAGNOSIS — I25.10 ATHEROSCLEROTIC HEART DISEASE OF NATIVE CORONARY ARTERY WITHOUT ANGINA PECTORIS: ICD-10-CM

## 2024-04-19 ENCOUNTER — RESULT REVIEW (OUTPATIENT)
Age: 54
End: 2024-04-19

## 2024-04-19 ENCOUNTER — OUTPATIENT (OUTPATIENT)
Dept: OUTPATIENT SERVICES | Facility: HOSPITAL | Age: 54
LOS: 1 days | End: 2024-04-19
Payer: COMMERCIAL

## 2024-04-19 DIAGNOSIS — C02.1 MALIGNANT NEOPLASM OF BORDER OF TONGUE: ICD-10-CM

## 2024-04-19 DIAGNOSIS — J34.2 DEVIATED NASAL SEPTUM: Chronic | ICD-10-CM

## 2024-04-19 DIAGNOSIS — Z90.49 ACQUIRED ABSENCE OF OTHER SPECIFIED PARTS OF DIGESTIVE TRACT: Chronic | ICD-10-CM

## 2024-04-19 DIAGNOSIS — Z96.641 PRESENCE OF RIGHT ARTIFICIAL HIP JOINT: Chronic | ICD-10-CM

## 2024-04-19 DIAGNOSIS — Z98.890 OTHER SPECIFIED POSTPROCEDURAL STATES: Chronic | ICD-10-CM

## 2024-04-19 LAB — GLUCOSE BLDC GLUCOMTR-MCNC: 139 MG/DL — HIGH (ref 70–99)

## 2024-04-19 PROCEDURE — 82962 GLUCOSE BLOOD TEST: CPT

## 2024-04-19 PROCEDURE — 78815 PET IMAGE W/CT SKULL-THIGH: CPT | Mod: 26,PI

## 2024-04-19 PROCEDURE — 78815 PET IMAGE W/CT SKULL-THIGH: CPT | Mod: PI

## 2024-04-19 PROCEDURE — A9552: CPT

## 2024-04-20 DIAGNOSIS — C02.1 MALIGNANT NEOPLASM OF BORDER OF TONGUE: ICD-10-CM

## 2024-04-24 ENCOUNTER — OUTPATIENT (OUTPATIENT)
Dept: OUTPATIENT SERVICES | Facility: HOSPITAL | Age: 54
LOS: 1 days | End: 2024-04-24
Payer: COMMERCIAL

## 2024-04-24 VITALS
RESPIRATION RATE: 19 BRPM | TEMPERATURE: 98 F | DIASTOLIC BLOOD PRESSURE: 90 MMHG | WEIGHT: 210.1 LBS | OXYGEN SATURATION: 96 % | SYSTOLIC BLOOD PRESSURE: 144 MMHG | HEIGHT: 70 IN | HEART RATE: 80 BPM

## 2024-04-24 DIAGNOSIS — Z01.818 ENCOUNTER FOR OTHER PREPROCEDURAL EXAMINATION: ICD-10-CM

## 2024-04-24 DIAGNOSIS — Z96.641 PRESENCE OF RIGHT ARTIFICIAL HIP JOINT: Chronic | ICD-10-CM

## 2024-04-24 DIAGNOSIS — Z98.890 OTHER SPECIFIED POSTPROCEDURAL STATES: Chronic | ICD-10-CM

## 2024-04-24 DIAGNOSIS — J34.2 DEVIATED NASAL SEPTUM: Chronic | ICD-10-CM

## 2024-04-24 DIAGNOSIS — C02.1 MALIGNANT NEOPLASM OF BORDER OF TONGUE: ICD-10-CM

## 2024-04-24 DIAGNOSIS — Z90.49 ACQUIRED ABSENCE OF OTHER SPECIFIED PARTS OF DIGESTIVE TRACT: Chronic | ICD-10-CM

## 2024-04-24 DIAGNOSIS — S99.911A UNSPECIFIED INJURY OF RIGHT ANKLE, INITIAL ENCOUNTER: Chronic | ICD-10-CM

## 2024-04-24 DIAGNOSIS — S49.90XA UNSPECIFIED INJURY OF SHOULDER AND UPPER ARM, UNSPECIFIED ARM, INITIAL ENCOUNTER: Chronic | ICD-10-CM

## 2024-04-24 DIAGNOSIS — M26.10 UNSPECIFIED ANOMALY OF JAW-CRANIAL BASE RELATIONSHIP: Chronic | ICD-10-CM

## 2024-04-24 LAB
A1C WITH ESTIMATED AVERAGE GLUCOSE RESULT: 6.1 % — HIGH (ref 4–5.6)
ALBUMIN SERPL ELPH-MCNC: 4.7 G/DL — SIGNIFICANT CHANGE UP (ref 3.5–5.2)
ALP SERPL-CCNC: 95 U/L — SIGNIFICANT CHANGE UP (ref 30–115)
ALT FLD-CCNC: 73 U/L — HIGH (ref 0–41)
ANION GAP SERPL CALC-SCNC: 14 MMOL/L — SIGNIFICANT CHANGE UP (ref 7–14)
APTT BLD: 32.1 SEC — SIGNIFICANT CHANGE UP (ref 27–39.2)
AST SERPL-CCNC: 52 U/L — HIGH (ref 0–41)
BASOPHILS # BLD AUTO: 0.04 K/UL — SIGNIFICANT CHANGE UP (ref 0–0.2)
BASOPHILS NFR BLD AUTO: 0.7 % — SIGNIFICANT CHANGE UP (ref 0–1)
BILIRUB SERPL-MCNC: 0.8 MG/DL — SIGNIFICANT CHANGE UP (ref 0.2–1.2)
BUN SERPL-MCNC: 11 MG/DL — SIGNIFICANT CHANGE UP (ref 10–20)
CALCIUM SERPL-MCNC: 9.7 MG/DL — SIGNIFICANT CHANGE UP (ref 8.4–10.5)
CHLORIDE SERPL-SCNC: 100 MMOL/L — SIGNIFICANT CHANGE UP (ref 98–110)
CO2 SERPL-SCNC: 25 MMOL/L — SIGNIFICANT CHANGE UP (ref 17–32)
CREAT SERPL-MCNC: 0.8 MG/DL — SIGNIFICANT CHANGE UP (ref 0.7–1.5)
EGFR: 105 ML/MIN/1.73M2 — SIGNIFICANT CHANGE UP
EOSINOPHIL # BLD AUTO: 0.2 K/UL — SIGNIFICANT CHANGE UP (ref 0–0.7)
EOSINOPHIL NFR BLD AUTO: 3.3 % — SIGNIFICANT CHANGE UP (ref 0–8)
ESTIMATED AVERAGE GLUCOSE: 128 MG/DL — HIGH (ref 68–114)
GLUCOSE SERPL-MCNC: 151 MG/DL — HIGH (ref 70–99)
HCT VFR BLD CALC: 43.6 % — SIGNIFICANT CHANGE UP (ref 42–52)
HGB BLD-MCNC: 15.4 G/DL — SIGNIFICANT CHANGE UP (ref 14–18)
IMM GRANULOCYTES NFR BLD AUTO: 0.3 % — SIGNIFICANT CHANGE UP (ref 0.1–0.3)
INR BLD: 0.94 RATIO — SIGNIFICANT CHANGE UP (ref 0.65–1.3)
LYMPHOCYTES # BLD AUTO: 1.7 K/UL — SIGNIFICANT CHANGE UP (ref 1.2–3.4)
LYMPHOCYTES # BLD AUTO: 28.1 % — SIGNIFICANT CHANGE UP (ref 20.5–51.1)
MCHC RBC-ENTMCNC: 33.8 PG — HIGH (ref 27–31)
MCHC RBC-ENTMCNC: 35.3 G/DL — SIGNIFICANT CHANGE UP (ref 32–37)
MCV RBC AUTO: 95.6 FL — HIGH (ref 80–94)
MONOCYTES # BLD AUTO: 0.87 K/UL — HIGH (ref 0.1–0.6)
MONOCYTES NFR BLD AUTO: 14.4 % — HIGH (ref 1.7–9.3)
NEUTROPHILS # BLD AUTO: 3.22 K/UL — SIGNIFICANT CHANGE UP (ref 1.4–6.5)
NEUTROPHILS NFR BLD AUTO: 53.2 % — SIGNIFICANT CHANGE UP (ref 42.2–75.2)
NRBC # BLD: 0 /100 WBCS — SIGNIFICANT CHANGE UP (ref 0–0)
PLATELET # BLD AUTO: 226 K/UL — SIGNIFICANT CHANGE UP (ref 130–400)
PMV BLD: 8.2 FL — SIGNIFICANT CHANGE UP (ref 7.4–10.4)
POTASSIUM SERPL-MCNC: 4.2 MMOL/L — SIGNIFICANT CHANGE UP (ref 3.5–5)
POTASSIUM SERPL-SCNC: 4.2 MMOL/L — SIGNIFICANT CHANGE UP (ref 3.5–5)
PROT SERPL-MCNC: 7.5 G/DL — SIGNIFICANT CHANGE UP (ref 6–8)
PROTHROM AB SERPL-ACNC: 10.7 SEC — SIGNIFICANT CHANGE UP (ref 9.95–12.87)
RBC # BLD: 4.56 M/UL — LOW (ref 4.7–6.1)
RBC # FLD: 11.8 % — SIGNIFICANT CHANGE UP (ref 11.5–14.5)
SODIUM SERPL-SCNC: 139 MMOL/L — SIGNIFICANT CHANGE UP (ref 135–146)
WBC # BLD: 6.05 K/UL — SIGNIFICANT CHANGE UP (ref 4.8–10.8)
WBC # FLD AUTO: 6.05 K/UL — SIGNIFICANT CHANGE UP (ref 4.8–10.8)

## 2024-04-24 PROCEDURE — 85610 PROTHROMBIN TIME: CPT

## 2024-04-24 PROCEDURE — 85025 COMPLETE CBC W/AUTO DIFF WBC: CPT

## 2024-04-24 PROCEDURE — 93010 ELECTROCARDIOGRAM REPORT: CPT

## 2024-04-24 PROCEDURE — 83036 HEMOGLOBIN GLYCOSYLATED A1C: CPT

## 2024-04-24 PROCEDURE — 93005 ELECTROCARDIOGRAM TRACING: CPT

## 2024-04-24 PROCEDURE — 99214 OFFICE O/P EST MOD 30 MIN: CPT | Mod: 25

## 2024-04-24 PROCEDURE — 85730 THROMBOPLASTIN TIME PARTIAL: CPT

## 2024-04-24 PROCEDURE — 80053 COMPREHEN METABOLIC PANEL: CPT

## 2024-04-24 PROCEDURE — 36415 COLL VENOUS BLD VENIPUNCTURE: CPT

## 2024-04-24 NOTE — H&P PST ADULT - MUSCULOSKELETAL
Left shoulder decreased rom secondary to pain/ROM intact/normal gait/strength 5/5 bilateral upper extremities/strength 5/5 bilateral lower extremities

## 2024-04-24 NOTE — H&P PST ADULT - REASON FOR ADMISSION
53 year old male presents for right anterior partial glossectomy, right posterior co2 laser ablation of tongue 5/8/24 with Dr. Mason

## 2024-04-24 NOTE — H&P PST ADULT - NSICDXPASTSURGICALHX_GEN_ALL_CORE_FT
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

## 2024-04-24 NOTE — H&P PST ADULT - NSICDXPASTMEDICALHX_GEN_ALL_CORE_FT
PAST MEDICAL HISTORY:  Asthma no recent asthma attacks as per pt    Diabetes mellitus     H/O carpal tunnel repair     Heart murmur     History of celiac disease     Hypertension     Low backache     OA (osteoarthritis)     Tongue cancer

## 2024-04-24 NOTE — H&P PST ADULT - HISTORY OF PRESENT ILLNESS
53 year old male presents for right anterior partial glossectomy, right posterior co2 laser ablation of tongue. Tongue cancer was found 1 year ago by dentist. Pt c/o pain to tongue. Pain is worst when he eats something spicy. Pain 10/10. Cold water helps the pain.     PATIENT CURRENTLY DENIES CHEST PAIN  SHORTNESS OF BREATH  PALPITATIONS,  DYSURIA, OR UPPER RESPIRATORY INFECTION IN PAST 2 WEEKS  Patient verbalized understanding of instructions and was given the opportunity to ask questions and have them answered.  As per patient, this is their complete medical and surgical history, including medications both prescribed or over the counter.  written and verbal instructions with teach back on chlorhexidine shampoo provided,  pt verbalized understanding with returned demonstration    Anesthesia Alert  NO--Difficult Airway  NO--History of neck surgery or radiation  NO--Limited ROM of neck  NO--History of Malignant hyperthermia  NO--Personal or family history of Pseudocholinesterase deficiency  NO--Prior Anesthesia Complication  NO--Latex Allergy  NO--Loose teeth  NO--History of Rheumatoid Arthritis  YES--Hx CHITO  NO--Bleeding risk  YES--Other- jaw surgery (sleep apnea) 2017, Two tongue masses , cancer    Mallampati airway: Class IV     Duke Activity Status Index (DASI) from [email or web address]  on 4/24/2024  ** All calculations should be rechecked by clinician prior to use **    RESULT SUMMARY:  58.2 points  The higher the score (maximum 58.2), the higher the functional status.    9.89 METs        INPUTS:  Take care of self —> 2.75 = Yes  Walk indoors —> 1.75 = Yes  Walk 1&ndash;2 blocks on level ground —> 2.75 = Yes  Climb a flight of stairs or walk up a hill —> 5.5 = Yes  Run a short distance —> 8 = Yes  Do light work around the house —> 2.7 = Yes  Do moderate work around the house —> 3.5 = Yes  Do heavy work around the house —> 8 = Yes  Do yardwork —> 4.5 = Yes  Have sexual relations —> 5.25 = Yes  Participate in moderate recreational activities —> 6 = Yes  Participate in strenuous sports —> 7.5 = Yes    Revised Cardiac Risk Index for Pre-Operative Risk from [employer]  on 4/24/2024  ** All calculations should be rechecked by clinician prior to use **    RESULT SUMMARY:  1 points  Class II Risk    6.0 %  30-day risk of death, MI, or cardiac arrest    [place] [facility]. These numbers are higher than those from the original study ([place]occo 1999). See Evidence for details.      INPUTS:  Elevated-risk surgery —> 0 = No  History of ischemic heart disease —> 0 = No  History of congestive heart failure —> 0 = No  History of cerebrovascular disease —> 0 = No  Pre-operative treatment with insulin —> 1 = Yes  Pre-operative creatinine >2 mg/dL / 176.8 µmol/L —> 0 = No

## 2024-04-25 DIAGNOSIS — C02.1 MALIGNANT NEOPLASM OF BORDER OF TONGUE: ICD-10-CM

## 2024-04-25 DIAGNOSIS — Z01.818 ENCOUNTER FOR OTHER PREPROCEDURAL EXAMINATION: ICD-10-CM

## 2024-04-29 ENCOUNTER — RESULT REVIEW (OUTPATIENT)
Age: 54
End: 2024-04-29

## 2024-04-30 ENCOUNTER — OUTPATIENT (OUTPATIENT)
Dept: OUTPATIENT SERVICES | Facility: HOSPITAL | Age: 54
LOS: 1 days | End: 2024-04-30
Payer: COMMERCIAL

## 2024-04-30 DIAGNOSIS — J34.2 DEVIATED NASAL SEPTUM: Chronic | ICD-10-CM

## 2024-04-30 DIAGNOSIS — Z96.641 PRESENCE OF RIGHT ARTIFICIAL HIP JOINT: Chronic | ICD-10-CM

## 2024-04-30 DIAGNOSIS — Z98.890 OTHER SPECIFIED POSTPROCEDURAL STATES: Chronic | ICD-10-CM

## 2024-04-30 DIAGNOSIS — S99.911A UNSPECIFIED INJURY OF RIGHT ANKLE, INITIAL ENCOUNTER: Chronic | ICD-10-CM

## 2024-04-30 DIAGNOSIS — M26.10 UNSPECIFIED ANOMALY OF JAW-CRANIAL BASE RELATIONSHIP: Chronic | ICD-10-CM

## 2024-04-30 DIAGNOSIS — Z90.49 ACQUIRED ABSENCE OF OTHER SPECIFIED PARTS OF DIGESTIVE TRACT: Chronic | ICD-10-CM

## 2024-04-30 DIAGNOSIS — I25.10 ATHEROSCLEROTIC HEART DISEASE OF NATIVE CORONARY ARTERY WITHOUT ANGINA PECTORIS: ICD-10-CM

## 2024-04-30 DIAGNOSIS — S49.90XA UNSPECIFIED INJURY OF SHOULDER AND UPPER ARM, UNSPECIFIED ARM, INITIAL ENCOUNTER: Chronic | ICD-10-CM

## 2024-04-30 PROBLEM — R01.1 CARDIAC MURMUR, UNSPECIFIED: Chronic | Status: ACTIVE | Noted: 2024-04-24

## 2024-04-30 PROBLEM — Z87.19 PERSONAL HISTORY OF OTHER DISEASES OF THE DIGESTIVE SYSTEM: Chronic | Status: ACTIVE | Noted: 2024-04-24

## 2024-04-30 PROBLEM — C02.9 MALIGNANT NEOPLASM OF TONGUE, UNSPECIFIED: Chronic | Status: ACTIVE | Noted: 2024-04-24

## 2024-04-30 PROCEDURE — 75580 N-INVAS EST C FFR SW ALY CTA: CPT

## 2024-04-30 PROCEDURE — 75580 N-INVAS EST C FFR SW ALY CTA: CPT | Mod: 26

## 2024-05-01 DIAGNOSIS — I25.10 ATHEROSCLEROTIC HEART DISEASE OF NATIVE CORONARY ARTERY WITHOUT ANGINA PECTORIS: ICD-10-CM

## 2024-05-07 NOTE — ASU PATIENT PROFILE, ADULT - FALL HARM RISK - UNIVERSAL INTERVENTIONS
Bed in lowest position, wheels locked, appropriate side rails in place/Call bell, personal items and telephone in reach/Instruct patient to call for assistance before getting out of bed or chair/Non-slip footwear when patient is out of bed/[place] to call system/Physically safe environment - no spills, clutter or unnecessary equipment/Purposeful Proactive Rounding/Room/bathroom lighting operational, light cord in reach

## 2024-05-07 NOTE — ASU PATIENT PROFILE, ADULT - NS TRANSFER EYEGLASSES PAIRS
Patient sent in a request for her Estradiol Cream sent to [facility] in [place]  I called [facility] in [place] to see if she had picked up the cream there since it is a compounded cream talked to Gigi she said they have it there so I called patient informed her to call [facility] she said she would   
pt denies

## 2024-05-08 ENCOUNTER — OUTPATIENT (OUTPATIENT)
Dept: OUTPATIENT SERVICES | Facility: HOSPITAL | Age: 54
LOS: 1 days | Discharge: HOME | End: 2024-05-08
Payer: COMMERCIAL

## 2024-05-08 ENCOUNTER — APPOINTMENT (OUTPATIENT)
Dept: OTOLARYNGOLOGY | Facility: HOSPITAL | Age: 54
End: 2024-05-08

## 2024-05-08 ENCOUNTER — RESULT REVIEW (OUTPATIENT)
Age: 54
End: 2024-05-08

## 2024-05-08 ENCOUNTER — TRANSCRIPTION ENCOUNTER (OUTPATIENT)
Age: 54
End: 2024-05-08

## 2024-05-08 VITALS
SYSTOLIC BLOOD PRESSURE: 137 MMHG | DIASTOLIC BLOOD PRESSURE: 89 MMHG | RESPIRATION RATE: 13 BRPM | HEART RATE: 80 BPM | OXYGEN SATURATION: 95 %

## 2024-05-08 VITALS
TEMPERATURE: 98 F | DIASTOLIC BLOOD PRESSURE: 75 MMHG | WEIGHT: 210.1 LBS | HEART RATE: 86 BPM | RESPIRATION RATE: 18 BRPM | HEIGHT: 70 IN | SYSTOLIC BLOOD PRESSURE: 119 MMHG | OXYGEN SATURATION: 96 %

## 2024-05-08 DIAGNOSIS — Z79.4 LONG TERM (CURRENT) USE OF INSULIN: ICD-10-CM

## 2024-05-08 DIAGNOSIS — J34.2 DEVIATED NASAL SEPTUM: Chronic | ICD-10-CM

## 2024-05-08 DIAGNOSIS — R01.1 CARDIAC MURMUR, UNSPECIFIED: ICD-10-CM

## 2024-05-08 DIAGNOSIS — K14.8 OTHER DISEASES OF TONGUE: ICD-10-CM

## 2024-05-08 DIAGNOSIS — S99.911A UNSPECIFIED INJURY OF RIGHT ANKLE, INITIAL ENCOUNTER: Chronic | ICD-10-CM

## 2024-05-08 DIAGNOSIS — Z98.890 OTHER SPECIFIED POSTPROCEDURAL STATES: Chronic | ICD-10-CM

## 2024-05-08 DIAGNOSIS — S49.90XA UNSPECIFIED INJURY OF SHOULDER AND UPPER ARM, UNSPECIFIED ARM, INITIAL ENCOUNTER: Chronic | ICD-10-CM

## 2024-05-08 DIAGNOSIS — C02.1 MALIGNANT NEOPLASM OF BORDER OF TONGUE: ICD-10-CM

## 2024-05-08 DIAGNOSIS — C02.3 MALIGNANT NEOPLASM OF ANTERIOR TWO-THIRDS OF TONGUE, PART UNSPECIFIED: ICD-10-CM

## 2024-05-08 DIAGNOSIS — E11.9 TYPE 2 DIABETES MELLITUS WITHOUT COMPLICATIONS: ICD-10-CM

## 2024-05-08 DIAGNOSIS — I10 ESSENTIAL (PRIMARY) HYPERTENSION: ICD-10-CM

## 2024-05-08 DIAGNOSIS — K90.0 CELIAC DISEASE: ICD-10-CM

## 2024-05-08 DIAGNOSIS — Z96.641 PRESENCE OF RIGHT ARTIFICIAL HIP JOINT: Chronic | ICD-10-CM

## 2024-05-08 DIAGNOSIS — M26.10 UNSPECIFIED ANOMALY OF JAW-CRANIAL BASE RELATIONSHIP: Chronic | ICD-10-CM

## 2024-05-08 DIAGNOSIS — Z90.49 ACQUIRED ABSENCE OF OTHER SPECIFIED PARTS OF DIGESTIVE TRACT: Chronic | ICD-10-CM

## 2024-05-08 LAB
BLD GP AB SCN SERPL QL: SIGNIFICANT CHANGE UP
GLUCOSE BLDC GLUCOMTR-MCNC: 191 MG/DL — HIGH (ref 70–99)

## 2024-05-08 PROCEDURE — 88309 TISSUE EXAM BY PATHOLOGIST: CPT

## 2024-05-08 PROCEDURE — 88305 TISSUE EXAM BY PATHOLOGIST: CPT

## 2024-05-08 PROCEDURE — 41120 PARTIAL REMOVAL OF TONGUE: CPT

## 2024-05-08 PROCEDURE — 82962 GLUCOSE BLOOD TEST: CPT

## 2024-05-08 PROCEDURE — 88309 TISSUE EXAM BY PATHOLOGIST: CPT | Mod: 26

## 2024-05-08 PROCEDURE — C9399: CPT

## 2024-05-08 PROCEDURE — 86901 BLOOD TYPING SEROLOGIC RH(D): CPT

## 2024-05-08 PROCEDURE — 36415 COLL VENOUS BLD VENIPUNCTURE: CPT

## 2024-05-08 PROCEDURE — 86900 BLOOD TYPING SEROLOGIC ABO: CPT

## 2024-05-08 PROCEDURE — 86850 RBC ANTIBODY SCREEN: CPT

## 2024-05-08 PROCEDURE — C1889: CPT

## 2024-05-08 PROCEDURE — 88305 TISSUE EXAM BY PATHOLOGIST: CPT | Mod: 26

## 2024-05-08 RX ORDER — HYDROMORPHONE HYDROCHLORIDE 2 MG/ML
0.5 INJECTION INTRAMUSCULAR; INTRAVENOUS; SUBCUTANEOUS
Refills: 0 | Status: DISCONTINUED | OUTPATIENT
Start: 2024-05-08 | End: 2024-05-08

## 2024-05-08 RX ORDER — ONDANSETRON 8 MG/1
4 TABLET, FILM COATED ORAL ONCE
Refills: 0 | Status: DISCONTINUED | OUTPATIENT
Start: 2024-05-08 | End: 2024-05-08

## 2024-05-08 RX ORDER — OXYCODONE AND ACETAMINOPHEN 5; 325 MG/1; MG/1
1 TABLET ORAL
Qty: 28 | Refills: 0
Start: 2024-05-08 | End: 2024-05-14

## 2024-05-08 RX ORDER — SODIUM CHLORIDE 9 MG/ML
1000 INJECTION, SOLUTION INTRAVENOUS
Refills: 0 | Status: DISCONTINUED | OUTPATIENT
Start: 2024-05-08 | End: 2024-05-08

## 2024-05-08 RX ORDER — MEPERIDINE HYDROCHLORIDE 50 MG/ML
12.5 INJECTION INTRAMUSCULAR; INTRAVENOUS; SUBCUTANEOUS ONCE
Refills: 0 | Status: DISCONTINUED | OUTPATIENT
Start: 2024-05-08 | End: 2024-05-08

## 2024-05-08 RX ORDER — CHLORHEXIDINE GLUCONATE 213 G/1000ML
15 SOLUTION TOPICAL
Qty: 1 | Refills: 0
Start: 2024-05-08 | End: 2024-05-14

## 2024-05-08 RX ADMIN — HYDROMORPHONE HYDROCHLORIDE 0.5 MILLIGRAM(S): 2 INJECTION INTRAMUSCULAR; INTRAVENOUS; SUBCUTANEOUS at 11:45

## 2024-05-08 RX ADMIN — HYDROMORPHONE HYDROCHLORIDE 0.5 MILLIGRAM(S): 2 INJECTION INTRAMUSCULAR; INTRAVENOUS; SUBCUTANEOUS at 11:15

## 2024-05-08 RX ADMIN — HYDROMORPHONE HYDROCHLORIDE 0.5 MILLIGRAM(S): 2 INJECTION INTRAMUSCULAR; INTRAVENOUS; SUBCUTANEOUS at 11:25

## 2024-05-08 RX ADMIN — HYDROMORPHONE HYDROCHLORIDE 0.5 MILLIGRAM(S): 2 INJECTION INTRAMUSCULAR; INTRAVENOUS; SUBCUTANEOUS at 11:55

## 2024-05-08 NOTE — CHART NOTE - NSCHARTNOTEFT_GEN_A_CORE
PACU ANESTHESIA ADMISSION NOTE      Procedure:   Post op diagnosis:      ____  Intubated  TV:______       Rate: ______      FiO2: ______    x____  Patent Airway    _x___  Full return of protective reflexes    __x__  Full recovery from anesthesia / back to baseline status    Vitals:  temp(F) 98  /80  spo2 99  RR 17  pulse 87    Mental Status:  _x___ Awake   _____ Alert   _____ Drowsy   _____ Sedated    Nausea/Vomiting:  x____ NO  ______Yes,   See Post - Op Orders          Pain Scale (0-10):  _____    Treatment: ____ None    _x___ See Post - Op/PCA Orders    Post - Operative Fluids:   ____ Oral   ___x_ See Post - Op Orders    Plan: Discharge:   __x __Home       _____Floor     _____Critical Care    _____  Other:_________________    Comments: uneventful anesthesia course no complications. Vitals stable. Pt transferred to PACU

## 2024-05-08 NOTE — ASU DISCHARGE PLAN (ADULT/PEDIATRIC) - CARE PROVIDER_API CALL
Stefano Mason  Head/Neck Surgery  [address] [place] [id number]  Phone: [phone]  Fax: [phone]  Follow Up Time:

## 2024-05-10 LAB — SURGICAL PATHOLOGY STUDY: SIGNIFICANT CHANGE UP

## 2024-05-16 ENCOUNTER — APPOINTMENT (OUTPATIENT)
Dept: OTOLARYNGOLOGY | Facility: CLINIC | Age: 54
End: 2024-05-16
Payer: COMMERCIAL

## 2024-05-16 DIAGNOSIS — K13.21 LEUKOPLAKIA OF ORAL MUCOSA, INCLUDING TONGUE: ICD-10-CM

## 2024-05-16 PROCEDURE — 99214 OFFICE O/P EST MOD 30 MIN: CPT | Mod: 57

## 2024-05-20 PROBLEM — K13.21 LEUKOPLAKIA OF TONGUE: Status: ACTIVE | Noted: 2024-03-25

## 2024-05-20 NOTE — ASSESSMENT
[FreeTextEntry1] : Path reviewed - negative margin excision, DOI 5 mm Plan for right neck dissection selective levels 1-4  Risks of neck surgery were discussed including unsightly scarring, bleeding, neurovascular injury including CN11 and CN12,  infection, general anesthesia, and need for reoperation Special risks of cancer surgery were also discussed including tumor recurrence, either locoregionally or metastatic, as well as death despite best treatment

## 2024-05-20 NOTE — HISTORY OF PRESENT ILLNESS
[FreeTextEntry1] : Patient presents today s/p Right partial glossectomy with CO2 laser. He states he is feeling soreness in the area. NO further complaints and he is otherwise doing well.

## 2024-05-20 NOTE — REASON FOR VISIT
[Subsequent Evaluation] : a subsequent evaluation for [FreeTextEntry2] : s/p Right partial glossectomy with CO2 laser

## 2024-06-07 ENCOUNTER — OUTPATIENT (OUTPATIENT)
Dept: OUTPATIENT SERVICES | Facility: HOSPITAL | Age: 54
LOS: 1 days | End: 2024-06-07
Payer: COMMERCIAL

## 2024-06-07 VITALS
TEMPERATURE: 98 F | HEIGHT: 70 IN | RESPIRATION RATE: 16 BRPM | SYSTOLIC BLOOD PRESSURE: 118 MMHG | DIASTOLIC BLOOD PRESSURE: 80 MMHG | OXYGEN SATURATION: 99 % | WEIGHT: 210.1 LBS | HEART RATE: 76 BPM

## 2024-06-07 DIAGNOSIS — S99.911A UNSPECIFIED INJURY OF RIGHT ANKLE, INITIAL ENCOUNTER: Chronic | ICD-10-CM

## 2024-06-07 DIAGNOSIS — Z98.890 OTHER SPECIFIED POSTPROCEDURAL STATES: Chronic | ICD-10-CM

## 2024-06-07 DIAGNOSIS — K14.8 OTHER DISEASES OF TONGUE: ICD-10-CM

## 2024-06-07 DIAGNOSIS — M26.10 UNSPECIFIED ANOMALY OF JAW-CRANIAL BASE RELATIONSHIP: Chronic | ICD-10-CM

## 2024-06-07 DIAGNOSIS — Z01.818 ENCOUNTER FOR OTHER PREPROCEDURAL EXAMINATION: ICD-10-CM

## 2024-06-07 DIAGNOSIS — S49.90XA UNSPECIFIED INJURY OF SHOULDER AND UPPER ARM, UNSPECIFIED ARM, INITIAL ENCOUNTER: Chronic | ICD-10-CM

## 2024-06-07 DIAGNOSIS — J34.2 DEVIATED NASAL SEPTUM: Chronic | ICD-10-CM

## 2024-06-07 DIAGNOSIS — Z90.49 ACQUIRED ABSENCE OF OTHER SPECIFIED PARTS OF DIGESTIVE TRACT: Chronic | ICD-10-CM

## 2024-06-07 DIAGNOSIS — Z96.641 PRESENCE OF RIGHT ARTIFICIAL HIP JOINT: Chronic | ICD-10-CM

## 2024-06-07 LAB
ALBUMIN SERPL ELPH-MCNC: 4.4 G/DL — SIGNIFICANT CHANGE UP (ref 3.5–5.2)
ALP SERPL-CCNC: 82 U/L — SIGNIFICANT CHANGE UP (ref 30–115)
ALT FLD-CCNC: 34 U/L — SIGNIFICANT CHANGE UP (ref 0–41)
ANION GAP SERPL CALC-SCNC: 10 MMOL/L — SIGNIFICANT CHANGE UP (ref 7–14)
AST SERPL-CCNC: 28 U/L — SIGNIFICANT CHANGE UP (ref 0–41)
BILIRUB SERPL-MCNC: 1.1 MG/DL — SIGNIFICANT CHANGE UP (ref 0.2–1.2)
BLD GP AB SCN SERPL QL: SIGNIFICANT CHANGE UP
BUN SERPL-MCNC: 14 MG/DL — SIGNIFICANT CHANGE UP (ref 10–20)
CALCIUM SERPL-MCNC: 9.2 MG/DL — SIGNIFICANT CHANGE UP (ref 8.4–10.5)
CHLORIDE SERPL-SCNC: 107 MMOL/L — SIGNIFICANT CHANGE UP (ref 98–110)
CO2 SERPL-SCNC: 25 MMOL/L — SIGNIFICANT CHANGE UP (ref 17–32)
CREAT SERPL-MCNC: 0.8 MG/DL — SIGNIFICANT CHANGE UP (ref 0.7–1.5)
EGFR: 105 ML/MIN/1.73M2 — SIGNIFICANT CHANGE UP
GLUCOSE SERPL-MCNC: 140 MG/DL — HIGH (ref 70–99)
POTASSIUM SERPL-MCNC: 4.2 MMOL/L — SIGNIFICANT CHANGE UP (ref 3.5–5)
POTASSIUM SERPL-SCNC: 4.2 MMOL/L — SIGNIFICANT CHANGE UP (ref 3.5–5)
PROT SERPL-MCNC: 6.5 G/DL — SIGNIFICANT CHANGE UP (ref 6–8)
SODIUM SERPL-SCNC: 142 MMOL/L — SIGNIFICANT CHANGE UP (ref 135–146)

## 2024-06-07 PROCEDURE — 36415 COLL VENOUS BLD VENIPUNCTURE: CPT

## 2024-06-07 PROCEDURE — 86850 RBC ANTIBODY SCREEN: CPT

## 2024-06-07 PROCEDURE — 86901 BLOOD TYPING SEROLOGIC RH(D): CPT

## 2024-06-07 PROCEDURE — 99214 OFFICE O/P EST MOD 30 MIN: CPT | Mod: 25

## 2024-06-07 PROCEDURE — 80053 COMPREHEN METABOLIC PANEL: CPT

## 2024-06-07 PROCEDURE — 86900 BLOOD TYPING SEROLOGIC ABO: CPT

## 2024-06-07 NOTE — H&P PST ADULT - NSICDXPASTSURGICALHX_GEN_ALL_CORE_FT
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

## 2024-06-07 NOTE — H&P PST ADULT - GASTROINTESTINAL
[de-identified] : normal exam , resume all reg activity 
normal/soft/nontender/nondistended/normal active bowel sounds

## 2024-06-07 NOTE — H&P PST ADULT - REASON FOR ADMISSION
Case Type: OP  Suite: [facility]  Proceduralist: Stefano Mason  Confirmed Surgery Date Time: 06-   PAST Date Time: 06- - 6:15  Procedure: RIGHT NECK DISSECTION  Laterality: Right  Length of Procedure: 180 Minutes  Anesthesia Type: General

## 2024-06-07 NOTE — H&P PST ADULT - NSANTHOSAYNRD_GEN_A_CORE
s/p jaw surgery for sleep apnea s/p jaw surgery for sleep apnea/No. CHITO screening performed.  STOP BANG Legend: 0-2 = LOW Risk; 3-4 = INTERMEDIATE Risk; 5-8 = HIGH Risk

## 2024-06-07 NOTE — H&P PST ADULT - HISTORY OF PRESENT ILLNESS
53 year old male with PMH of s/p right anterior partial glossectomy, right posterior co2 laser ablation of tongue on 5/8/24 who presents for the preparations right neck dissection due of Right neck lymphadenopathy.   Patient denies any cp, sob, palpitations, fever, cough, URI, abdominal pains, N/V, UTI, Rashes or open wounds.  As per patient exercise tolerance of 3 fos walks with out sob  \  Patient verbalized understanding of instructions and was given the opportunity to ask questions and have them answered.  As per patient, this is their complete medical and surgical history, including medications both prescribed or over the counter.  written and verbal instructions with teach back on chlorhexidine shampoo provided,  pt verbalized understanding with returned demonstration  Anesthesia Alert  YES--Difficult Airway Class IV  NO--History of neck surgery or radiation  NO--Limited ROM of neck  NO--History of Malignant hyperthermia  NO--Personal or family history of Pseudocholinesterase deficiency  NO--Prior Anesthesia Complication  NO--Latex Allergy  NO--Loose teeth  NO--History of Rheumatoid Arthritis  YES--Hx CHITO  NO--Bleeding risk  YES--Other- jaw surgery (sleep apnea) 2017  Duke Activity Status Index (DASI) from [employer]  on 6/7/2024  ** All calculations should be rechecked by clinician prior to use **    RESULT SUMMARY:  58.2 points  The higher the score (maximum 58.2), the higher the functional status.  9.89 METs  INPUTS:  Take care of self —> 2.75 = Yes  Walk indoors —> 1.75 = Yes  Walk 1&ndash;2 blocks on level ground —> 2.75 = Yes  Climb a flight of stairs or walk up a hill —> 5.5 = Yes  Run a short distance —> 8 = Yes  Do light work around the house —> 2.7 = Yes  Do moderate work around the house —> 3.5 = Yes  Do heavy work around the house —> 8 = Yes  Do yardwork —> 4.5 = Yes  Have sexual relations —> 5.25 = Yes  Participate in moderate recreational activities —> 6 = Yes  Participate in strenuous sports —> 7.5 = Yes  Revised Cardiac Risk Index for Pre-Operative Risk from [employer]  on 6/7/2024  ** All calculations should be rechecked by clinician prior to use **    RESULT SUMMARY:  1 points  Class II Risk    6.0 %  30-day risk of death, MI, or cardiac arrest    From [employer] 2017. These numbers are higher than those from the original study (Rocco 1999). See Evidence for details.      INPUTS:  Elevated-risk surgery —> 1 = Yes  History of ischemic heart disease —> 0 = No  History of congestive heart failure —> 0 = No  History of cerebrovascular disease —> 0 = No  Pre-operative treatment with insulin —> 0 = No  Pre-operative creatinine >2 mg/dL / 176.8 µmol/L —> 0 = No       53 year old male with PMH of DM, HTN, Tongue cancer s/p right anterior partial glossectomy, right posterior co2 laser ablation of tongue on 5/8/24 who presents for the preparations right neck dissection due of Right neck lymphadenopathy.   Patient denies any cp, sob, palpitations, fever, cough, URI, abdominal pains, N/V, UTI, Rashes or open wounds.  As per patient exercise tolerance of 3 fos walks with out sob  Patient denies any s/s covid 19 and reports no contact with known positive people.   Patient verbalized understanding of instructions and was given the opportunity to ask questions and have them answered.  As per patient, this is their complete medical and surgical history, including medications both prescribed or over the counter.  written and verbal instructions with teach back on chlorhexidine shampoo provided,  pt verbalized understanding with returned demonstration  Anesthesia Alert  YES--Difficult Airway Class IV  NO--History of neck surgery or radiation  NO--Limited ROM of neck  NO--History of Malignant hyperthermia  NO--Personal or family history of Pseudocholinesterase deficiency  NO--Prior Anesthesia Complication  NO--Latex Allergy  NO--Loose teeth  NO--History of Rheumatoid Arthritis  YES--Hx CHITO  NO--Bleeding risk  YES--Other- jaw surgery (sleep apnea) 2017  Duke Activity Status Index (DASI) from [employer]  on 6/7/2024  ** All calculations should be rechecked by clinician prior to use **  RESULT SUMMARY:  58.2 points  The higher the score (maximum 58.2), the higher the functional status.  9.89 METs  INPUTS:  Take care of self —> 2.75 = Yes  Walk indoors —> 1.75 = Yes  Walk 1&ndash;2 blocks on level ground —> 2.75 = Yes  Climb a flight of stairs or walk up a hill —> 5.5 = Yes  Run a short distance —> 8 = Yes  Do light work around the house —> 2.7 = Yes  Do moderate work around the house —> 3.5 = Yes  Do heavy work around the house —> 8 = Yes  Do yard work —> 4.5 = Yes  Have sexual relations —> 5.25 = Yes  Participate in moderate recreational activities —> 6 = Yes  Participate in strenuous sports —> 7.5 = Yes  Revised Cardiac Risk Index for Pre-Operative Risk from [employer]  on 6/7/2024  ** All calculations should be rechecked by clinician prior to use **    RESULT SUMMARY:  1 points  Class II Risk  6.0 %  30-day risk of death, MI, or cardiac arrest  From [employer] 2017. These numbers are higher than those from the original study (Rocco 1999). See Evidence for details.  INPUTS:  Elevated-risk surgery —> 1 = Yes  History of ischemic heart disease —> 0 = No  History of congestive heart failure —> 0 = No  History of cerebrovascular disease —> 0 = No  Pre-operative treatment with insulin —> 0 = No  Pre-operative creatinine >2 mg/dL / 176.8 µmol/L —> 0 = No  Opioid Risk Assessment Tool (Male)       Family history of substance abuse            Alcohol (3)            Illegal Drugs (3)            Prescription drugs (4)       Personal history of substance abuse            Alcohol (3)            Illegal Drugs (4)            Prescription drugs (5)       Age between 16-45 (1)       History of preadolescent sexual abuse (0)       Psychological disease (ADD, ADHD, OCD, Bipolar Disorder, Schizophrenia, Depression) (1)    Scoring Totals:  Low Risk (0-3)  Moderate Risk (4-7)  High Risk (>/=8)

## 2024-06-07 NOTE — H&P PST ADULT - NSICDXPASTMEDICALHX_GEN_ALL_CORE_FT
PAST MEDICAL HISTORY:  Asthma no recent asthma attacks as per pt    Diabetes mellitus     H/O carpal tunnel repair     Heart murmur     History of celiac disease     Hypertension     Low backache     OA (osteoarthritis)     Tongue cancer      PAST MEDICAL HISTORY:  Asthma no recent asthma attacks as per pt    Diabetes mellitus     H/O carpal tunnel repair     Heart murmur     History of celiac disease     Hypertension     Low backache     OA (osteoarthritis)     Oral leukoplakia     Oral lichen planus     Tongue cancer

## 2024-06-08 DIAGNOSIS — Z01.818 ENCOUNTER FOR OTHER PREPROCEDURAL EXAMINATION: ICD-10-CM

## 2024-06-08 DIAGNOSIS — K14.8 OTHER DISEASES OF TONGUE: ICD-10-CM

## 2024-06-21 ENCOUNTER — APPOINTMENT (OUTPATIENT)
Dept: OTOLARYNGOLOGY | Facility: HOSPITAL | Age: 54
End: 2024-06-21

## 2024-06-21 ENCOUNTER — INPATIENT (INPATIENT)
Facility: HOSPITAL | Age: 54
LOS: 1 days | Discharge: HOME | DRG: 159 | End: 2024-06-23
Attending: STUDENT IN AN ORGANIZED HEALTH CARE EDUCATION/TRAINING PROGRAM | Admitting: STUDENT IN AN ORGANIZED HEALTH CARE EDUCATION/TRAINING PROGRAM
Payer: COMMERCIAL

## 2024-06-21 ENCOUNTER — RESULT REVIEW (OUTPATIENT)
Age: 54
End: 2024-06-21

## 2024-06-21 VITALS
HEIGHT: 70 IN | DIASTOLIC BLOOD PRESSURE: 78 MMHG | OXYGEN SATURATION: 98 % | WEIGHT: 210.1 LBS | RESPIRATION RATE: 18 BRPM | TEMPERATURE: 98 F | HEART RATE: 81 BPM | SYSTOLIC BLOOD PRESSURE: 128 MMHG

## 2024-06-21 DIAGNOSIS — Z98.890 OTHER SPECIFIED POSTPROCEDURAL STATES: Chronic | ICD-10-CM

## 2024-06-21 DIAGNOSIS — Z96.641 PRESENCE OF RIGHT ARTIFICIAL HIP JOINT: Chronic | ICD-10-CM

## 2024-06-21 DIAGNOSIS — Z90.49 ACQUIRED ABSENCE OF OTHER SPECIFIED PARTS OF DIGESTIVE TRACT: Chronic | ICD-10-CM

## 2024-06-21 DIAGNOSIS — K14.8 OTHER DISEASES OF TONGUE: ICD-10-CM

## 2024-06-21 DIAGNOSIS — M26.10 UNSPECIFIED ANOMALY OF JAW-CRANIAL BASE RELATIONSHIP: Chronic | ICD-10-CM

## 2024-06-21 DIAGNOSIS — S99.911A UNSPECIFIED INJURY OF RIGHT ANKLE, INITIAL ENCOUNTER: Chronic | ICD-10-CM

## 2024-06-21 DIAGNOSIS — S49.90XA UNSPECIFIED INJURY OF SHOULDER AND UPPER ARM, UNSPECIFIED ARM, INITIAL ENCOUNTER: Chronic | ICD-10-CM

## 2024-06-21 DIAGNOSIS — J34.2 DEVIATED NASAL SEPTUM: Chronic | ICD-10-CM

## 2024-06-21 LAB
GLUCOSE BLDC GLUCOMTR-MCNC: 104 MG/DL — HIGH (ref 70–99)
GLUCOSE BLDC GLUCOMTR-MCNC: 95 MG/DL — SIGNIFICANT CHANGE UP (ref 70–99)

## 2024-06-21 PROCEDURE — 80048 BASIC METABOLIC PNL TOTAL CA: CPT

## 2024-06-21 PROCEDURE — 38724 REMOVAL OF LYMPH NODES NECK: CPT | Mod: AS

## 2024-06-21 PROCEDURE — 85027 COMPLETE CBC AUTOMATED: CPT

## 2024-06-21 PROCEDURE — 38724 REMOVAL OF LYMPH NODES NECK: CPT | Mod: RT,78

## 2024-06-21 PROCEDURE — 88309 TISSUE EXAM BY PATHOLOGIST: CPT | Mod: 26

## 2024-06-21 PROCEDURE — 82962 GLUCOSE BLOOD TEST: CPT

## 2024-06-21 PROCEDURE — C9399: CPT

## 2024-06-21 PROCEDURE — 36415 COLL VENOUS BLD VENIPUNCTURE: CPT

## 2024-06-21 RX ORDER — LOSARTAN POTASSIUM 100 MG/1
100 TABLET, FILM COATED ORAL DAILY
Refills: 0 | Status: DISCONTINUED | OUTPATIENT
Start: 2024-06-21 | End: 2024-06-23

## 2024-06-21 RX ORDER — HYDROMORPHONE HCL 0.2 MG/ML
0.5 INJECTION, SOLUTION INTRAVENOUS
Refills: 0 | Status: DISCONTINUED | OUTPATIENT
Start: 2024-06-21 | End: 2024-06-21

## 2024-06-21 RX ORDER — HYDROMORPHONE HCL 0.2 MG/ML
1 INJECTION, SOLUTION INTRAVENOUS
Refills: 0 | Status: DISCONTINUED | OUTPATIENT
Start: 2024-06-21 | End: 2024-06-21

## 2024-06-21 RX ORDER — MONTELUKAST SODIUM 10 MG/1
10 TABLET, FILM COATED ORAL DAILY
Refills: 0 | Status: DISCONTINUED | OUTPATIENT
Start: 2024-06-21 | End: 2024-06-23

## 2024-06-21 RX ORDER — OXYCODONE AND ACETAMINOPHEN 5; 325 MG/1; MG/1
1 TABLET ORAL EVERY 4 HOURS
Refills: 0 | Status: DISCONTINUED | OUTPATIENT
Start: 2024-06-21 | End: 2024-06-23

## 2024-06-21 RX ORDER — INSULIN NPH HUM/REG INSULIN HM 70-30/ML
0 VIAL (ML) SUBCUTANEOUS
Qty: 0 | Refills: 0 | DISCHARGE

## 2024-06-21 RX ORDER — AMLODIPINE BESYLATE AND OLMESARTRAN MEDOXOMIL 10; 40 MG/1; MG/1
1 TABLET, FILM COATED ORAL
Qty: 0 | Refills: 0 | DISCHARGE

## 2024-06-21 RX ORDER — DAPAGLIFLOZIN 10 MG/1
1 TABLET, FILM COATED ORAL
Qty: 0 | Refills: 0 | DISCHARGE

## 2024-06-21 RX ORDER — AMLODIPINE BESYLATE 2.5 MG/1
1 TABLET ORAL
Qty: 0 | Refills: 0 | DISCHARGE

## 2024-06-21 RX ORDER — ONDANSETRON HYDROCHLORIDE 2 MG/ML
4 INJECTION INTRAMUSCULAR; INTRAVENOUS ONCE
Refills: 0 | Status: DISCONTINUED | OUTPATIENT
Start: 2024-06-21 | End: 2024-06-21

## 2024-06-21 RX ORDER — HEPARIN SODIUM 50 [USP'U]/ML
5000 INJECTION, SOLUTION INTRAVENOUS EVERY 8 HOURS
Refills: 0 | Status: DISCONTINUED | OUTPATIENT
Start: 2024-06-21 | End: 2024-06-23

## 2024-06-21 RX ORDER — AMLODIPINE BESYLATE 2.5 MG/1
5 TABLET ORAL DAILY
Refills: 0 | Status: DISCONTINUED | OUTPATIENT
Start: 2024-06-21 | End: 2024-06-23

## 2024-06-21 RX ORDER — ATORVASTATIN CALCIUM 20 MG/1
10 TABLET, FILM COATED ORAL AT BEDTIME
Refills: 0 | Status: DISCONTINUED | OUTPATIENT
Start: 2024-06-21 | End: 2024-06-23

## 2024-06-21 RX ORDER — MONTELUKAST 4 MG/1
1 TABLET, CHEWABLE ORAL
Qty: 0 | Refills: 0 | DISCHARGE

## 2024-06-21 RX ORDER — ONDANSETRON HYDROCHLORIDE 2 MG/ML
4 INJECTION INTRAMUSCULAR; INTRAVENOUS EVERY 8 HOURS
Refills: 0 | Status: DISCONTINUED | OUTPATIENT
Start: 2024-06-21 | End: 2024-06-23

## 2024-06-21 RX ORDER — ACETAMINOPHEN 325 MG
650 TABLET ORAL EVERY 6 HOURS
Refills: 0 | Status: DISCONTINUED | OUTPATIENT
Start: 2024-06-21 | End: 2024-06-23

## 2024-06-21 RX ADMIN — ATORVASTATIN CALCIUM 10 MILLIGRAM(S): 20 TABLET, FILM COATED ORAL at 22:18

## 2024-06-21 RX ADMIN — HEPARIN SODIUM 5000 UNIT(S): 50 INJECTION, SOLUTION INTRAVENOUS at 22:18

## 2024-06-22 LAB
ANION GAP SERPL CALC-SCNC: 15 MMOL/L — HIGH (ref 7–14)
BUN SERPL-MCNC: 7 MG/DL — LOW (ref 10–20)
CALCIUM SERPL-MCNC: 8.9 MG/DL — SIGNIFICANT CHANGE UP (ref 8.4–10.5)
CHLORIDE SERPL-SCNC: 102 MMOL/L — SIGNIFICANT CHANGE UP (ref 98–110)
CO2 SERPL-SCNC: 23 MMOL/L — SIGNIFICANT CHANGE UP (ref 17–32)
CREAT SERPL-MCNC: 0.8 MG/DL — SIGNIFICANT CHANGE UP (ref 0.7–1.5)
EGFR: 105 ML/MIN/1.73M2 — SIGNIFICANT CHANGE UP
GLUCOSE BLDC GLUCOMTR-MCNC: 116 MG/DL — HIGH (ref 70–99)
GLUCOSE BLDC GLUCOMTR-MCNC: 122 MG/DL — HIGH (ref 70–99)
GLUCOSE BLDC GLUCOMTR-MCNC: 140 MG/DL — HIGH (ref 70–99)
GLUCOSE BLDC GLUCOMTR-MCNC: 164 MG/DL — HIGH (ref 70–99)
GLUCOSE SERPL-MCNC: 109 MG/DL — HIGH (ref 70–99)
HCT VFR BLD CALC: 40.9 % — LOW (ref 42–52)
HGB BLD-MCNC: 14.3 G/DL — SIGNIFICANT CHANGE UP (ref 14–18)
MCHC RBC-ENTMCNC: 33.8 PG — HIGH (ref 27–31)
MCHC RBC-ENTMCNC: 35 G/DL — SIGNIFICANT CHANGE UP (ref 32–37)
MCV RBC AUTO: 96.7 FL — HIGH (ref 80–94)
NRBC # BLD: 0 /100 WBCS — SIGNIFICANT CHANGE UP (ref 0–0)
PLATELET # BLD AUTO: 188 K/UL — SIGNIFICANT CHANGE UP (ref 130–400)
PMV BLD: 8.4 FL — SIGNIFICANT CHANGE UP (ref 7.4–10.4)
POTASSIUM SERPL-MCNC: 3.8 MMOL/L — SIGNIFICANT CHANGE UP (ref 3.5–5)
POTASSIUM SERPL-SCNC: 3.8 MMOL/L — SIGNIFICANT CHANGE UP (ref 3.5–5)
RBC # BLD: 4.23 M/UL — LOW (ref 4.7–6.1)
RBC # FLD: 12 % — SIGNIFICANT CHANGE UP (ref 11.5–14.5)
SODIUM SERPL-SCNC: 140 MMOL/L — SIGNIFICANT CHANGE UP (ref 135–146)
WBC # BLD: 8.2 K/UL — SIGNIFICANT CHANGE UP (ref 4.8–10.8)
WBC # FLD AUTO: 8.2 K/UL — SIGNIFICANT CHANGE UP (ref 4.8–10.8)

## 2024-06-22 RX ADMIN — ATORVASTATIN CALCIUM 10 MILLIGRAM(S): 20 TABLET, FILM COATED ORAL at 21:09

## 2024-06-22 RX ADMIN — HEPARIN SODIUM 5000 UNIT(S): 50 INJECTION, SOLUTION INTRAVENOUS at 05:43

## 2024-06-22 RX ADMIN — AMLODIPINE BESYLATE 5 MILLIGRAM(S): 2.5 TABLET ORAL at 05:43

## 2024-06-22 RX ADMIN — LOSARTAN POTASSIUM 100 MILLIGRAM(S): 100 TABLET, FILM COATED ORAL at 05:43

## 2024-06-22 RX ADMIN — HEPARIN SODIUM 5000 UNIT(S): 50 INJECTION, SOLUTION INTRAVENOUS at 13:44

## 2024-06-22 RX ADMIN — HEPARIN SODIUM 5000 UNIT(S): 50 INJECTION, SOLUTION INTRAVENOUS at 21:10

## 2024-06-22 RX ADMIN — MONTELUKAST SODIUM 10 MILLIGRAM(S): 10 TABLET, FILM COATED ORAL at 12:21

## 2024-06-23 ENCOUNTER — TRANSCRIPTION ENCOUNTER (OUTPATIENT)
Age: 54
End: 2024-06-23

## 2024-06-23 VITALS
SYSTOLIC BLOOD PRESSURE: 132 MMHG | RESPIRATION RATE: 20 BRPM | HEART RATE: 81 BPM | TEMPERATURE: 98 F | DIASTOLIC BLOOD PRESSURE: 80 MMHG

## 2024-06-23 LAB
GLUCOSE BLDC GLUCOMTR-MCNC: 159 MG/DL — HIGH (ref 70–99)
GLUCOSE BLDC GLUCOMTR-MCNC: 194 MG/DL — HIGH (ref 70–99)

## 2024-06-23 RX ORDER — OXYCODONE AND ACETAMINOPHEN 5; 325 MG/1; MG/1
1 TABLET ORAL
Qty: 12 | Refills: 0
Start: 2024-06-23 | End: 2024-06-25

## 2024-06-23 RX ADMIN — MONTELUKAST SODIUM 10 MILLIGRAM(S): 10 TABLET, FILM COATED ORAL at 12:12

## 2024-06-23 RX ADMIN — Medication 650 MILLIGRAM(S): at 12:12

## 2024-06-23 RX ADMIN — AMLODIPINE BESYLATE 5 MILLIGRAM(S): 2.5 TABLET ORAL at 12:12

## 2024-06-23 RX ADMIN — LOSARTAN POTASSIUM 100 MILLIGRAM(S): 100 TABLET, FILM COATED ORAL at 05:13

## 2024-06-23 RX ADMIN — HEPARIN SODIUM 5000 UNIT(S): 50 INJECTION, SOLUTION INTRAVENOUS at 05:13

## 2024-06-24 PROBLEM — L43.8 OTHER LICHEN PLANUS: Chronic | Status: ACTIVE | Noted: 2024-06-07

## 2024-06-24 PROBLEM — K13.21 LEUKOPLAKIA OF ORAL MUCOSA, INCLUDING TONGUE: Chronic | Status: ACTIVE | Noted: 2024-06-07

## 2024-06-25 LAB — SURGICAL PATHOLOGY STUDY: SIGNIFICANT CHANGE UP

## 2024-06-30 ENCOUNTER — NON-APPOINTMENT (OUTPATIENT)
Age: 54
End: 2024-06-30

## 2024-07-01 ENCOUNTER — APPOINTMENT (OUTPATIENT)
Dept: OTOLARYNGOLOGY | Facility: CLINIC | Age: 54
End: 2024-07-01
Payer: COMMERCIAL

## 2024-07-01 DIAGNOSIS — I89.0 LYMPHEDEMA, NOT ELSEWHERE CLASSIFIED: ICD-10-CM

## 2024-07-01 PROBLEM — C02.1: Status: ACTIVE | Noted: 2024-04-05

## 2024-07-01 PROBLEM — C77.0 MALIGNANT NEOPLASM METASTATIC TO LYMPH NODE OF NECK: Status: ACTIVE | Noted: 2024-07-01

## 2024-07-01 PROCEDURE — 99024 POSTOP FOLLOW-UP VISIT: CPT

## 2024-07-10 ENCOUNTER — APPOINTMENT (OUTPATIENT)
Dept: RADIATION ONCOLOGY | Facility: HOSPITAL | Age: 54
End: 2024-07-10
Payer: COMMERCIAL

## 2024-07-10 ENCOUNTER — OUTPATIENT (OUTPATIENT)
Dept: OUTPATIENT SERVICES | Facility: HOSPITAL | Age: 54
LOS: 1 days | Discharge: HOME | End: 2024-07-10
Payer: COMMERCIAL

## 2024-07-10 ENCOUNTER — NON-APPOINTMENT (OUTPATIENT)
Age: 54
End: 2024-07-10

## 2024-07-10 VITALS
BODY MASS INDEX: 29.35 KG/M2 | SYSTOLIC BLOOD PRESSURE: 106 MMHG | DIASTOLIC BLOOD PRESSURE: 68 MMHG | RESPIRATION RATE: 16 BRPM | OXYGEN SATURATION: 97 % | HEIGHT: 70 IN | WEIGHT: 205 LBS | TEMPERATURE: 97.8 F | HEART RATE: 70 BPM

## 2024-07-10 DIAGNOSIS — C77.0 SECONDARY AND UNSPECIFIED MALIGNANT NEOPLASM OF LYMPH NODES OF HEAD, FACE AND NECK: ICD-10-CM

## 2024-07-10 DIAGNOSIS — Z98.890 OTHER SPECIFIED POSTPROCEDURAL STATES: Chronic | ICD-10-CM

## 2024-07-10 DIAGNOSIS — S49.90XA UNSPECIFIED INJURY OF SHOULDER AND UPPER ARM, UNSPECIFIED ARM, INITIAL ENCOUNTER: Chronic | ICD-10-CM

## 2024-07-10 DIAGNOSIS — S99.911A UNSPECIFIED INJURY OF RIGHT ANKLE, INITIAL ENCOUNTER: Chronic | ICD-10-CM

## 2024-07-10 DIAGNOSIS — C02.1 MALIGNANT NEOPLASM OF BORDER OF TONGUE: ICD-10-CM

## 2024-07-10 DIAGNOSIS — Z90.49 ACQUIRED ABSENCE OF OTHER SPECIFIED PARTS OF DIGESTIVE TRACT: Chronic | ICD-10-CM

## 2024-07-10 DIAGNOSIS — M26.10 UNSPECIFIED ANOMALY OF JAW-CRANIAL BASE RELATIONSHIP: Chronic | ICD-10-CM

## 2024-07-10 DIAGNOSIS — J34.2 DEVIATED NASAL SEPTUM: Chronic | ICD-10-CM

## 2024-07-10 DIAGNOSIS — Z96.641 PRESENCE OF RIGHT ARTIFICIAL HIP JOINT: Chronic | ICD-10-CM

## 2024-07-10 PROCEDURE — 99205 OFFICE O/P NEW HI 60 MIN: CPT

## 2024-07-10 RX ORDER — AMLODIPINE AND OLMESARTAN MEDOXOMIL 5; 40 MG/1; MG/1
5-40 TABLET ORAL
Refills: 0 | Status: DISCONTINUED | COMMUNITY

## 2024-07-10 RX ORDER — AMLODIPINE AND OLMESARTAN MEDOXOMIL 5; 40 MG/1; MG/1
5-40 TABLET ORAL
Refills: 0 | Status: ACTIVE | COMMUNITY

## 2024-07-15 DIAGNOSIS — C02.1 MALIGNANT NEOPLASM OF BORDER OF TONGUE: ICD-10-CM

## 2024-07-17 ENCOUNTER — OUTPATIENT (OUTPATIENT)
Dept: OUTPATIENT SERVICES | Facility: HOSPITAL | Age: 54
LOS: 1 days | Discharge: HOME | End: 2024-07-17

## 2024-07-17 DIAGNOSIS — Z96.641 PRESENCE OF RIGHT ARTIFICIAL HIP JOINT: Chronic | ICD-10-CM

## 2024-07-17 DIAGNOSIS — Z98.890 OTHER SPECIFIED POSTPROCEDURAL STATES: Chronic | ICD-10-CM

## 2024-07-17 DIAGNOSIS — S49.90XA UNSPECIFIED INJURY OF SHOULDER AND UPPER ARM, UNSPECIFIED ARM, INITIAL ENCOUNTER: Chronic | ICD-10-CM

## 2024-07-17 DIAGNOSIS — Z90.49 ACQUIRED ABSENCE OF OTHER SPECIFIED PARTS OF DIGESTIVE TRACT: Chronic | ICD-10-CM

## 2024-07-17 DIAGNOSIS — J34.2 DEVIATED NASAL SEPTUM: Chronic | ICD-10-CM

## 2024-07-17 DIAGNOSIS — M26.10 UNSPECIFIED ANOMALY OF JAW-CRANIAL BASE RELATIONSHIP: Chronic | ICD-10-CM

## 2024-07-17 DIAGNOSIS — S99.911A UNSPECIFIED INJURY OF RIGHT ANKLE, INITIAL ENCOUNTER: Chronic | ICD-10-CM

## 2024-07-17 PROCEDURE — 77263 THER RADIOLOGY TX PLNG CPLX: CPT

## 2024-07-17 PROCEDURE — 77334 RADIATION TREATMENT AID(S): CPT | Mod: 26

## 2024-07-19 DIAGNOSIS — C02.1 MALIGNANT NEOPLASM OF BORDER OF TONGUE: ICD-10-CM

## 2024-07-19 DIAGNOSIS — C77.0 SECONDARY AND UNSPECIFIED MALIGNANT NEOPLASM OF LYMPH NODES OF HEAD, FACE AND NECK: ICD-10-CM

## 2024-07-25 ENCOUNTER — OUTPATIENT (OUTPATIENT)
Dept: OUTPATIENT SERVICES | Facility: HOSPITAL | Age: 54
LOS: 1 days | Discharge: HOME | End: 2024-07-25
Payer: COMMERCIAL

## 2024-07-25 DIAGNOSIS — Z98.890 OTHER SPECIFIED POSTPROCEDURAL STATES: Chronic | ICD-10-CM

## 2024-07-25 DIAGNOSIS — M26.10 UNSPECIFIED ANOMALY OF JAW-CRANIAL BASE RELATIONSHIP: Chronic | ICD-10-CM

## 2024-07-25 DIAGNOSIS — I89.0 LYMPHEDEMA, NOT ELSEWHERE CLASSIFIED: ICD-10-CM

## 2024-07-25 DIAGNOSIS — Z96.641 PRESENCE OF RIGHT ARTIFICIAL HIP JOINT: Chronic | ICD-10-CM

## 2024-07-25 DIAGNOSIS — J34.2 DEVIATED NASAL SEPTUM: Chronic | ICD-10-CM

## 2024-07-25 DIAGNOSIS — C02.1 MALIGNANT NEOPLASM OF BORDER OF TONGUE: ICD-10-CM

## 2024-07-25 DIAGNOSIS — S99.911A UNSPECIFIED INJURY OF RIGHT ANKLE, INITIAL ENCOUNTER: Chronic | ICD-10-CM

## 2024-07-25 DIAGNOSIS — Z90.49 ACQUIRED ABSENCE OF OTHER SPECIFIED PARTS OF DIGESTIVE TRACT: Chronic | ICD-10-CM

## 2024-07-25 DIAGNOSIS — S49.90XA UNSPECIFIED INJURY OF SHOULDER AND UPPER ARM, UNSPECIFIED ARM, INITIAL ENCOUNTER: Chronic | ICD-10-CM

## 2024-07-25 PROCEDURE — 97535 SELF CARE MNGMENT TRAINING: CPT | Mod: GO

## 2024-07-25 PROCEDURE — 97165 OT EVAL LOW COMPLEX 30 MIN: CPT | Mod: GO

## 2024-07-26 DIAGNOSIS — C02.1 MALIGNANT NEOPLASM OF BORDER OF TONGUE: ICD-10-CM

## 2024-07-26 DIAGNOSIS — I89.0 LYMPHEDEMA, NOT ELSEWHERE CLASSIFIED: ICD-10-CM

## 2024-07-30 ENCOUNTER — OUTPATIENT (OUTPATIENT)
Dept: OUTPATIENT SERVICES | Facility: HOSPITAL | Age: 54
LOS: 1 days | Discharge: HOME | End: 2024-07-30

## 2024-07-30 DIAGNOSIS — J34.2 DEVIATED NASAL SEPTUM: Chronic | ICD-10-CM

## 2024-07-30 DIAGNOSIS — M26.10 UNSPECIFIED ANOMALY OF JAW-CRANIAL BASE RELATIONSHIP: Chronic | ICD-10-CM

## 2024-07-30 DIAGNOSIS — Z98.890 OTHER SPECIFIED POSTPROCEDURAL STATES: Chronic | ICD-10-CM

## 2024-07-30 DIAGNOSIS — C02.1 MALIGNANT NEOPLASM OF BORDER OF TONGUE: ICD-10-CM

## 2024-07-30 DIAGNOSIS — I89.0 LYMPHEDEMA, NOT ELSEWHERE CLASSIFIED: ICD-10-CM

## 2024-07-30 DIAGNOSIS — Z96.641 PRESENCE OF RIGHT ARTIFICIAL HIP JOINT: Chronic | ICD-10-CM

## 2024-07-30 DIAGNOSIS — S99.911A UNSPECIFIED INJURY OF RIGHT ANKLE, INITIAL ENCOUNTER: Chronic | ICD-10-CM

## 2024-07-30 DIAGNOSIS — Z90.49 ACQUIRED ABSENCE OF OTHER SPECIFIED PARTS OF DIGESTIVE TRACT: Chronic | ICD-10-CM

## 2024-07-30 DIAGNOSIS — S49.90XA UNSPECIFIED INJURY OF SHOULDER AND UPPER ARM, UNSPECIFIED ARM, INITIAL ENCOUNTER: Chronic | ICD-10-CM

## 2024-08-01 ENCOUNTER — OUTPATIENT (OUTPATIENT)
Dept: OUTPATIENT SERVICES | Facility: HOSPITAL | Age: 54
LOS: 1 days | End: 2024-08-01
Payer: COMMERCIAL

## 2024-08-01 ENCOUNTER — NON-APPOINTMENT (OUTPATIENT)
Age: 54
End: 2024-08-01

## 2024-08-01 ENCOUNTER — OUTPATIENT (OUTPATIENT)
Dept: OUTPATIENT SERVICES | Facility: HOSPITAL | Age: 54
LOS: 1 days | Discharge: HOME | End: 2024-08-01
Payer: COMMERCIAL

## 2024-08-01 DIAGNOSIS — M26.10 UNSPECIFIED ANOMALY OF JAW-CRANIAL BASE RELATIONSHIP: Chronic | ICD-10-CM

## 2024-08-01 DIAGNOSIS — Z96.641 PRESENCE OF RIGHT ARTIFICIAL HIP JOINT: Chronic | ICD-10-CM

## 2024-08-01 DIAGNOSIS — Z98.890 OTHER SPECIFIED POSTPROCEDURAL STATES: Chronic | ICD-10-CM

## 2024-08-01 DIAGNOSIS — S49.90XA UNSPECIFIED INJURY OF SHOULDER AND UPPER ARM, UNSPECIFIED ARM, INITIAL ENCOUNTER: Chronic | ICD-10-CM

## 2024-08-01 DIAGNOSIS — Z90.49 ACQUIRED ABSENCE OF OTHER SPECIFIED PARTS OF DIGESTIVE TRACT: Chronic | ICD-10-CM

## 2024-08-01 DIAGNOSIS — I89.0 LYMPHEDEMA, NOT ELSEWHERE CLASSIFIED: ICD-10-CM

## 2024-08-01 DIAGNOSIS — S99.911A UNSPECIFIED INJURY OF RIGHT ANKLE, INITIAL ENCOUNTER: Chronic | ICD-10-CM

## 2024-08-01 DIAGNOSIS — J34.2 DEVIATED NASAL SEPTUM: Chronic | ICD-10-CM

## 2024-08-01 DIAGNOSIS — C02.1 MALIGNANT NEOPLASM OF BORDER OF TONGUE: ICD-10-CM

## 2024-08-01 PROCEDURE — 77338 DESIGN MLC DEVICE FOR IMRT: CPT

## 2024-08-01 PROCEDURE — 97140 MANUAL THERAPY 1/> REGIONS: CPT | Mod: GO

## 2024-08-01 PROCEDURE — 77338 DESIGN MLC DEVICE FOR IMRT: CPT | Mod: 26

## 2024-08-01 PROCEDURE — 77301 RADIOTHERAPY DOSE PLAN IMRT: CPT | Mod: 26

## 2024-08-01 PROCEDURE — 77300 RADIATION THERAPY DOSE PLAN: CPT | Mod: 26

## 2024-08-01 PROCEDURE — 77300 RADIATION THERAPY DOSE PLAN: CPT

## 2024-08-01 PROCEDURE — 97110 THERAPEUTIC EXERCISES: CPT | Mod: GO

## 2024-08-01 PROCEDURE — 77301 RADIOTHERAPY DOSE PLAN IMRT: CPT

## 2024-08-02 ENCOUNTER — APPOINTMENT (OUTPATIENT)
Dept: OTOLARYNGOLOGY | Facility: CLINIC | Age: 54
End: 2024-08-02
Payer: COMMERCIAL

## 2024-08-02 DIAGNOSIS — I89.0 LYMPHEDEMA, NOT ELSEWHERE CLASSIFIED: ICD-10-CM

## 2024-08-02 DIAGNOSIS — C02.1 MALIGNANT NEOPLASM OF BORDER OF TONGUE: ICD-10-CM

## 2024-08-02 PROCEDURE — 99024 POSTOP FOLLOW-UP VISIT: CPT

## 2024-08-02 NOTE — REASON FOR VISIT
[Subsequent Evaluation] : a subsequent evaluation for [FreeTextEntry2] : s/p modified neck  dissection.

## 2024-08-02 NOTE — HISTORY OF PRESENT ILLNESS
[de-identified] : Oncology Summary Stage: [id number] Site: Right oral tongue Pathology: Squamous cell carcinoma Treatment: Right partial glossectomy 5/2024 (8mm primary with 5mm DOI, negative margin, no PNI/LVI), right 1-4 neck dissection 6/2024 (2/46 LN, largest 11mm, no RUSLAN) Disease status: Post-operative Swallow: Normal Airway: Normal Smoking: Never smoker Thyroid: [FreeTextEntry1] : Patient is following up today s/p modified neck dissection. He states since surgery he has been doing well. He still has numbness in the neck but otherwise is doing well.  No further complaints

## 2024-08-06 ENCOUNTER — OUTPATIENT (OUTPATIENT)
Dept: OUTPATIENT SERVICES | Facility: HOSPITAL | Age: 54
LOS: 1 days | End: 2024-08-06

## 2024-08-06 DIAGNOSIS — Z98.890 OTHER SPECIFIED POSTPROCEDURAL STATES: Chronic | ICD-10-CM

## 2024-08-06 DIAGNOSIS — C02.1 MALIGNANT NEOPLASM OF BORDER OF TONGUE: ICD-10-CM

## 2024-08-06 DIAGNOSIS — Z96.641 PRESENCE OF RIGHT ARTIFICIAL HIP JOINT: Chronic | ICD-10-CM

## 2024-08-06 DIAGNOSIS — S99.911A UNSPECIFIED INJURY OF RIGHT ANKLE, INITIAL ENCOUNTER: Chronic | ICD-10-CM

## 2024-08-06 DIAGNOSIS — S49.90XA UNSPECIFIED INJURY OF SHOULDER AND UPPER ARM, UNSPECIFIED ARM, INITIAL ENCOUNTER: Chronic | ICD-10-CM

## 2024-08-06 DIAGNOSIS — J34.2 DEVIATED NASAL SEPTUM: Chronic | ICD-10-CM

## 2024-08-06 DIAGNOSIS — Z90.49 ACQUIRED ABSENCE OF OTHER SPECIFIED PARTS OF DIGESTIVE TRACT: Chronic | ICD-10-CM

## 2024-08-06 DIAGNOSIS — M26.10 UNSPECIFIED ANOMALY OF JAW-CRANIAL BASE RELATIONSHIP: Chronic | ICD-10-CM

## 2024-08-06 DIAGNOSIS — I89.0 LYMPHEDEMA, NOT ELSEWHERE CLASSIFIED: ICD-10-CM

## 2024-08-07 ENCOUNTER — OUTPATIENT (OUTPATIENT)
Dept: OUTPATIENT SERVICES | Facility: HOSPITAL | Age: 54
LOS: 1 days | Discharge: HOME | End: 2024-08-07
Payer: COMMERCIAL

## 2024-08-07 DIAGNOSIS — M26.10 UNSPECIFIED ANOMALY OF JAW-CRANIAL BASE RELATIONSHIP: Chronic | ICD-10-CM

## 2024-08-07 DIAGNOSIS — Z96.641 PRESENCE OF RIGHT ARTIFICIAL HIP JOINT: Chronic | ICD-10-CM

## 2024-08-07 DIAGNOSIS — Z98.890 OTHER SPECIFIED POSTPROCEDURAL STATES: Chronic | ICD-10-CM

## 2024-08-07 DIAGNOSIS — Z90.49 ACQUIRED ABSENCE OF OTHER SPECIFIED PARTS OF DIGESTIVE TRACT: Chronic | ICD-10-CM

## 2024-08-07 PROCEDURE — 77334 RADIATION TREATMENT AID(S): CPT | Mod: 26

## 2024-08-08 ENCOUNTER — OUTPATIENT (OUTPATIENT)
Dept: OUTPATIENT SERVICES | Facility: HOSPITAL | Age: 54
LOS: 1 days | Discharge: HOME | End: 2024-08-08

## 2024-08-08 ENCOUNTER — OUTPATIENT (OUTPATIENT)
Dept: OUTPATIENT SERVICES | Facility: HOSPITAL | Age: 54
LOS: 1 days | End: 2024-08-08

## 2024-08-08 DIAGNOSIS — C02.1 MALIGNANT NEOPLASM OF BORDER OF TONGUE: ICD-10-CM

## 2024-08-08 DIAGNOSIS — Z98.890 OTHER SPECIFIED POSTPROCEDURAL STATES: Chronic | ICD-10-CM

## 2024-08-08 DIAGNOSIS — Z90.49 ACQUIRED ABSENCE OF OTHER SPECIFIED PARTS OF DIGESTIVE TRACT: Chronic | ICD-10-CM

## 2024-08-08 DIAGNOSIS — S49.90XA UNSPECIFIED INJURY OF SHOULDER AND UPPER ARM, UNSPECIFIED ARM, INITIAL ENCOUNTER: Chronic | ICD-10-CM

## 2024-08-08 DIAGNOSIS — J34.2 DEVIATED NASAL SEPTUM: Chronic | ICD-10-CM

## 2024-08-08 DIAGNOSIS — M26.10 UNSPECIFIED ANOMALY OF JAW-CRANIAL BASE RELATIONSHIP: Chronic | ICD-10-CM

## 2024-08-08 DIAGNOSIS — I89.0 LYMPHEDEMA, NOT ELSEWHERE CLASSIFIED: ICD-10-CM

## 2024-08-08 DIAGNOSIS — Z96.641 PRESENCE OF RIGHT ARTIFICIAL HIP JOINT: Chronic | ICD-10-CM

## 2024-08-08 DIAGNOSIS — S99.911A UNSPECIFIED INJURY OF RIGHT ANKLE, INITIAL ENCOUNTER: Chronic | ICD-10-CM

## 2024-08-08 PROCEDURE — 77387B: CUSTOM | Mod: 26

## 2024-08-08 PROCEDURE — 77427 RADIATION TX MANAGEMENT X5: CPT

## 2024-08-08 NOTE — ED ADULT NURSE NOTE - CCCP TRG CHIEF CMPLNT
Peripheral Block    Patient location during procedure: holding area  Reason for block: post-op pain management  Start time: 8/8/2024 12:42 PM  End time: 8/8/2024 12:44 PM  Staffing  Performed: anesthesiologist   Anesthesiologist: Anup Pascual MD  Performed by: Anup Pascual MD  Authorized by: Anup Pascual MD    Preanesthetic Checklist  Completed: patient identified, IV checked, site marked, risks and benefits discussed, surgical/procedural consents, equipment checked, pre-op evaluation, timeout performed, anesthesia consent given, oxygen available, monitors applied/VS acknowledged, fire risk safety assessment completed and verbalized and blood product R/B/A discussed and consented  Peripheral Block   Patient position: supine  Prep: ChloraPrep  Provider prep: mask and sterile gloves  Patient monitoring: continuous pulse ox and frequent blood pressure checks  Block type: Femoral  Adductor canal  Laterality: right  Injection technique: single-shot  Guidance: ultrasound guided  Local infiltration: lidocaine  Local infiltration: lidocaine    Needle   Needle type: Quincke   Needle gauge: 20 G  Needle localization: ultrasound guidance  Needle length: 10 cm  Assessment   Injection assessment: negative aspiration for heme, no paresthesia on injection, local visualized surrounding nerve on ultrasound and no intravascular symptoms  Paresthesia pain: none  Slow fractionated injection: yes  Hemodynamics: stable  Outcomes: uncomplicated and patient tolerated procedure well    Medications Administered  ropivacaine (NAROPIN) injection 0.5% - Perineural   20 mL - 8/8/2024 12:42:00 PM          
hypoglycemia

## 2024-08-09 ENCOUNTER — OUTPATIENT (OUTPATIENT)
Dept: OUTPATIENT SERVICES | Facility: HOSPITAL | Age: 54
LOS: 1 days | Discharge: HOME | End: 2024-08-09

## 2024-08-09 DIAGNOSIS — J34.2 DEVIATED NASAL SEPTUM: Chronic | ICD-10-CM

## 2024-08-09 DIAGNOSIS — S49.90XA UNSPECIFIED INJURY OF SHOULDER AND UPPER ARM, UNSPECIFIED ARM, INITIAL ENCOUNTER: Chronic | ICD-10-CM

## 2024-08-09 DIAGNOSIS — C77.0 SECONDARY AND UNSPECIFIED MALIGNANT NEOPLASM OF LYMPH NODES OF HEAD, FACE AND NECK: ICD-10-CM

## 2024-08-09 DIAGNOSIS — Z96.641 PRESENCE OF RIGHT ARTIFICIAL HIP JOINT: Chronic | ICD-10-CM

## 2024-08-09 DIAGNOSIS — Z90.49 ACQUIRED ABSENCE OF OTHER SPECIFIED PARTS OF DIGESTIVE TRACT: Chronic | ICD-10-CM

## 2024-08-09 DIAGNOSIS — C02.1 MALIGNANT NEOPLASM OF BORDER OF TONGUE: ICD-10-CM

## 2024-08-12 ENCOUNTER — OUTPATIENT (OUTPATIENT)
Dept: OUTPATIENT SERVICES | Facility: HOSPITAL | Age: 54
LOS: 1 days | Discharge: HOME | End: 2024-08-12

## 2024-08-12 DIAGNOSIS — S99.911A UNSPECIFIED INJURY OF RIGHT ANKLE, INITIAL ENCOUNTER: Chronic | ICD-10-CM

## 2024-08-12 DIAGNOSIS — Z98.890 OTHER SPECIFIED POSTPROCEDURAL STATES: Chronic | ICD-10-CM

## 2024-08-12 PROCEDURE — 77387B: CUSTOM | Mod: 26

## 2024-08-13 ENCOUNTER — OUTPATIENT (OUTPATIENT)
Dept: OUTPATIENT SERVICES | Facility: HOSPITAL | Age: 54
LOS: 1 days | End: 2024-08-13

## 2024-08-13 ENCOUNTER — OUTPATIENT (OUTPATIENT)
Dept: OUTPATIENT SERVICES | Facility: HOSPITAL | Age: 54
LOS: 1 days | Discharge: HOME | End: 2024-08-13

## 2024-08-13 ENCOUNTER — NON-APPOINTMENT (OUTPATIENT)
Age: 54
End: 2024-08-13

## 2024-08-13 VITALS
HEART RATE: 63 BPM | SYSTOLIC BLOOD PRESSURE: 122 MMHG | WEIGHT: 206.25 LBS | BODY MASS INDEX: 29.59 KG/M2 | TEMPERATURE: 97.9 F | RESPIRATION RATE: 16 BRPM | DIASTOLIC BLOOD PRESSURE: 77 MMHG | OXYGEN SATURATION: 98 %

## 2024-08-13 DIAGNOSIS — C02.1 MALIGNANT NEOPLASM OF BORDER OF TONGUE: ICD-10-CM

## 2024-08-13 DIAGNOSIS — S99.911A UNSPECIFIED INJURY OF RIGHT ANKLE, INITIAL ENCOUNTER: Chronic | ICD-10-CM

## 2024-08-13 DIAGNOSIS — S49.90XA UNSPECIFIED INJURY OF SHOULDER AND UPPER ARM, UNSPECIFIED ARM, INITIAL ENCOUNTER: Chronic | ICD-10-CM

## 2024-08-13 DIAGNOSIS — M26.10 UNSPECIFIED ANOMALY OF JAW-CRANIAL BASE RELATIONSHIP: Chronic | ICD-10-CM

## 2024-08-13 DIAGNOSIS — Z98.890 OTHER SPECIFIED POSTPROCEDURAL STATES: Chronic | ICD-10-CM

## 2024-08-13 DIAGNOSIS — C77.0 SECONDARY AND UNSPECIFIED MALIGNANT NEOPLASM OF LYMPH NODES OF HEAD, FACE AND NECK: ICD-10-CM

## 2024-08-13 DIAGNOSIS — Z90.49 ACQUIRED ABSENCE OF OTHER SPECIFIED PARTS OF DIGESTIVE TRACT: Chronic | ICD-10-CM

## 2024-08-13 DIAGNOSIS — Z96.641 PRESENCE OF RIGHT ARTIFICIAL HIP JOINT: Chronic | ICD-10-CM

## 2024-08-13 DIAGNOSIS — I89.0 LYMPHEDEMA, NOT ELSEWHERE CLASSIFIED: ICD-10-CM

## 2024-08-13 DIAGNOSIS — J34.2 DEVIATED NASAL SEPTUM: Chronic | ICD-10-CM

## 2024-08-13 PROCEDURE — 77387B: CUSTOM | Mod: 26

## 2024-08-13 NOTE — HISTORY OF PRESENT ILLNESS
[FreeTextEntry1] : 8/13/2024 OTV: 4/30 to the Head and Neck: Patient reports doing well. He has no complaints of trouble swallowing. Tolerating a regular diet. He complains of neck and tongue numbness since surgery.  We reviewed skin care. Pt verbalized understanding. He continues to work for [place] [employer]. Plans to retire in two weeks.

## 2024-08-13 NOTE — DISEASE MANAGEMENT
[Pathological] : TNM Stage: p [N/A] : Currently not applicable [TTNM] : 1 [NTNM] : 2b [MTNM] : x [de-identified] : 800cGy [de-identified] : 6000cGy [de-identified] : Head and Neck Head and Neck

## 2024-08-13 NOTE — REVIEW OF SYSTEMS
[Dysphagia: Grade 0] : Dysphagia: Grade 0 [Fatigue: Grade 0] : Fatigue: Grade 0 [Mucositis Oral: Grade 0] : Mucositis Oral: Grade 0  [Oral Pain: Grade 0] : Oral Pain: Grade 0 [Dysgeusia: Grade 1- Altered taste but no change in diet] : Dysgeusia: Grade 1 - Altered taste but no change in diet [Dermatitis Radiation: Grade 0] : Dermatitis Radiation: Grade 0

## 2024-08-13 NOTE — DISEASE MANAGEMENT
[Pathological] : TNM Stage: p [N/A] : Currently not applicable [TTNM] : 1 [NTNM] : 2b [MTNM] : x [de-identified] : 800cGy [de-identified] : 6000cGy [de-identified] : Head and Neck Head and Neck

## 2024-08-13 NOTE — DISEASE MANAGEMENT
[Pathological] : TNM Stage: p [N/A] : Currently not applicable [TTNM] : 1 [NTNM] : 2b [MTNM] : x [de-identified] : 800cGy [de-identified] : 6000cGy [de-identified] : Head and Neck Head and Neck

## 2024-08-14 ENCOUNTER — OUTPATIENT (OUTPATIENT)
Dept: OUTPATIENT SERVICES | Facility: HOSPITAL | Age: 54
LOS: 1 days | Discharge: HOME | End: 2024-08-14

## 2024-08-14 DIAGNOSIS — C77.0 SECONDARY AND UNSPECIFIED MALIGNANT NEOPLASM OF LYMPH NODES OF HEAD, FACE AND NECK: ICD-10-CM

## 2024-08-14 DIAGNOSIS — J34.2 DEVIATED NASAL SEPTUM: Chronic | ICD-10-CM

## 2024-08-14 DIAGNOSIS — S99.911A UNSPECIFIED INJURY OF RIGHT ANKLE, INITIAL ENCOUNTER: Chronic | ICD-10-CM

## 2024-08-14 DIAGNOSIS — M26.10 UNSPECIFIED ANOMALY OF JAW-CRANIAL BASE RELATIONSHIP: Chronic | ICD-10-CM

## 2024-08-14 DIAGNOSIS — C02.1 MALIGNANT NEOPLASM OF BORDER OF TONGUE: ICD-10-CM

## 2024-08-14 DIAGNOSIS — Z98.890 OTHER SPECIFIED POSTPROCEDURAL STATES: Chronic | ICD-10-CM

## 2024-08-14 PROCEDURE — 77387B: CUSTOM | Mod: 26

## 2024-08-15 ENCOUNTER — OUTPATIENT (OUTPATIENT)
Dept: OUTPATIENT SERVICES | Facility: HOSPITAL | Age: 54
LOS: 1 days | End: 2024-08-15

## 2024-08-15 ENCOUNTER — OUTPATIENT (OUTPATIENT)
Dept: OUTPATIENT SERVICES | Facility: HOSPITAL | Age: 54
LOS: 1 days | Discharge: HOME | End: 2024-08-15

## 2024-08-15 DIAGNOSIS — S99.911A UNSPECIFIED INJURY OF RIGHT ANKLE, INITIAL ENCOUNTER: Chronic | ICD-10-CM

## 2024-08-15 DIAGNOSIS — M26.10 UNSPECIFIED ANOMALY OF JAW-CRANIAL BASE RELATIONSHIP: Chronic | ICD-10-CM

## 2024-08-15 DIAGNOSIS — Z90.49 ACQUIRED ABSENCE OF OTHER SPECIFIED PARTS OF DIGESTIVE TRACT: Chronic | ICD-10-CM

## 2024-08-15 DIAGNOSIS — Z98.890 OTHER SPECIFIED POSTPROCEDURAL STATES: Chronic | ICD-10-CM

## 2024-08-15 DIAGNOSIS — C02.1 MALIGNANT NEOPLASM OF BORDER OF TONGUE: ICD-10-CM

## 2024-08-15 DIAGNOSIS — I89.0 LYMPHEDEMA, NOT ELSEWHERE CLASSIFIED: ICD-10-CM

## 2024-08-15 DIAGNOSIS — S49.90XA UNSPECIFIED INJURY OF SHOULDER AND UPPER ARM, UNSPECIFIED ARM, INITIAL ENCOUNTER: Chronic | ICD-10-CM

## 2024-08-15 DIAGNOSIS — Z96.641 PRESENCE OF RIGHT ARTIFICIAL HIP JOINT: Chronic | ICD-10-CM

## 2024-08-15 DIAGNOSIS — J34.2 DEVIATED NASAL SEPTUM: Chronic | ICD-10-CM

## 2024-08-15 PROCEDURE — 77427 RADIATION TX MANAGEMENT X5: CPT

## 2024-08-15 PROCEDURE — 77387B: CUSTOM | Mod: 26

## 2024-08-16 ENCOUNTER — OUTPATIENT (OUTPATIENT)
Dept: OUTPATIENT SERVICES | Facility: HOSPITAL | Age: 54
LOS: 1 days | Discharge: HOME | End: 2024-08-16

## 2024-08-16 DIAGNOSIS — S49.90XA UNSPECIFIED INJURY OF SHOULDER AND UPPER ARM, UNSPECIFIED ARM, INITIAL ENCOUNTER: Chronic | ICD-10-CM

## 2024-08-16 DIAGNOSIS — C02.1 MALIGNANT NEOPLASM OF BORDER OF TONGUE: ICD-10-CM

## 2024-08-16 DIAGNOSIS — Z96.641 PRESENCE OF RIGHT ARTIFICIAL HIP JOINT: Chronic | ICD-10-CM

## 2024-08-16 DIAGNOSIS — S99.911A UNSPECIFIED INJURY OF RIGHT ANKLE, INITIAL ENCOUNTER: Chronic | ICD-10-CM

## 2024-08-16 DIAGNOSIS — C77.0 SECONDARY AND UNSPECIFIED MALIGNANT NEOPLASM OF LYMPH NODES OF HEAD, FACE AND NECK: ICD-10-CM

## 2024-08-16 DIAGNOSIS — Z98.890 OTHER SPECIFIED POSTPROCEDURAL STATES: Chronic | ICD-10-CM

## 2024-08-19 ENCOUNTER — OUTPATIENT (OUTPATIENT)
Dept: OUTPATIENT SERVICES | Facility: HOSPITAL | Age: 54
LOS: 1 days | Discharge: HOME | End: 2024-08-19

## 2024-08-19 DIAGNOSIS — M26.10 UNSPECIFIED ANOMALY OF JAW-CRANIAL BASE RELATIONSHIP: Chronic | ICD-10-CM

## 2024-08-19 DIAGNOSIS — Z98.890 OTHER SPECIFIED POSTPROCEDURAL STATES: Chronic | ICD-10-CM

## 2024-08-19 DIAGNOSIS — C77.0 SECONDARY AND UNSPECIFIED MALIGNANT NEOPLASM OF LYMPH NODES OF HEAD, FACE AND NECK: ICD-10-CM

## 2024-08-19 DIAGNOSIS — C02.1 MALIGNANT NEOPLASM OF BORDER OF TONGUE: ICD-10-CM

## 2024-08-19 DIAGNOSIS — Z90.49 ACQUIRED ABSENCE OF OTHER SPECIFIED PARTS OF DIGESTIVE TRACT: Chronic | ICD-10-CM

## 2024-08-19 DIAGNOSIS — Z96.641 PRESENCE OF RIGHT ARTIFICIAL HIP JOINT: Chronic | ICD-10-CM

## 2024-08-19 DIAGNOSIS — J34.2 DEVIATED NASAL SEPTUM: Chronic | ICD-10-CM

## 2024-08-19 DIAGNOSIS — S49.90XA UNSPECIFIED INJURY OF SHOULDER AND UPPER ARM, UNSPECIFIED ARM, INITIAL ENCOUNTER: Chronic | ICD-10-CM

## 2024-08-19 DIAGNOSIS — S99.911A UNSPECIFIED INJURY OF RIGHT ANKLE, INITIAL ENCOUNTER: Chronic | ICD-10-CM

## 2024-08-19 PROCEDURE — 77387B: CUSTOM | Mod: 26

## 2024-08-20 ENCOUNTER — OUTPATIENT (OUTPATIENT)
Dept: OUTPATIENT SERVICES | Facility: HOSPITAL | Age: 54
LOS: 1 days | Discharge: HOME | End: 2024-08-20

## 2024-08-20 ENCOUNTER — NON-APPOINTMENT (OUTPATIENT)
Age: 54
End: 2024-08-20

## 2024-08-20 VITALS
RESPIRATION RATE: 14 BRPM | OXYGEN SATURATION: 100 % | SYSTOLIC BLOOD PRESSURE: 123 MMHG | HEART RATE: 69 BPM | TEMPERATURE: 97.5 F | WEIGHT: 201 LBS | DIASTOLIC BLOOD PRESSURE: 79 MMHG | BODY MASS INDEX: 28.84 KG/M2

## 2024-08-20 DIAGNOSIS — Z98.890 OTHER SPECIFIED POSTPROCEDURAL STATES: Chronic | ICD-10-CM

## 2024-08-20 DIAGNOSIS — C02.1 MALIGNANT NEOPLASM OF BORDER OF TONGUE: ICD-10-CM

## 2024-08-20 DIAGNOSIS — S99.911A UNSPECIFIED INJURY OF RIGHT ANKLE, INITIAL ENCOUNTER: Chronic | ICD-10-CM

## 2024-08-20 DIAGNOSIS — S49.90XA UNSPECIFIED INJURY OF SHOULDER AND UPPER ARM, UNSPECIFIED ARM, INITIAL ENCOUNTER: Chronic | ICD-10-CM

## 2024-08-20 DIAGNOSIS — Z90.49 ACQUIRED ABSENCE OF OTHER SPECIFIED PARTS OF DIGESTIVE TRACT: Chronic | ICD-10-CM

## 2024-08-20 DIAGNOSIS — C77.0 SECONDARY AND UNSPECIFIED MALIGNANT NEOPLASM OF LYMPH NODES OF HEAD, FACE AND NECK: ICD-10-CM

## 2024-08-20 DIAGNOSIS — M26.10 UNSPECIFIED ANOMALY OF JAW-CRANIAL BASE RELATIONSHIP: Chronic | ICD-10-CM

## 2024-08-20 DIAGNOSIS — Z96.641 PRESENCE OF RIGHT ARTIFICIAL HIP JOINT: Chronic | ICD-10-CM

## 2024-08-20 DIAGNOSIS — J34.2 DEVIATED NASAL SEPTUM: Chronic | ICD-10-CM

## 2024-08-20 PROCEDURE — 77387B: CUSTOM | Mod: 26

## 2024-08-20 RX ORDER — NYSTATIN 100000 [USP'U]/ML
100000 SUSPENSION ORAL 3 TIMES DAILY
Qty: 150 | Refills: 0 | Status: ACTIVE | COMMUNITY
Start: 2024-08-20 | End: 1900-01-01

## 2024-08-20 NOTE — DISEASE MANAGEMENT
[Pathological] : TNM Stage: p [N/A] : Currently not applicable [TTNM] : 1 [NTNM] : 2b [MTNM] : x [de-identified] : 800cGy [de-identified] : 6000cGy [de-identified] : Head and Neck Head and Neck

## 2024-08-20 NOTE — DISEASE MANAGEMENT
[Pathological] : TNM Stage: p [N/A] : Currently not applicable [TTNM] : 1 [NTNM] : 2b [MTNM] : x [de-identified] : 800cGy [de-identified] : 6000cGy [de-identified] : Head and Neck Head and Neck

## 2024-08-20 NOTE — HISTORY OF PRESENT ILLNESS
[FreeTextEntry1] : 08/20/2024 OTV 9/30 treatments to the head & neck. Mr. Love reports "ok". His chief concern is small mouth sores starting inside his mouth. He avoids spicy, hot food and eats mostly a soft diet maintained at room temperature. Nutritionist Flaca Nolan went over a diet plan with the patient which includes protein supplements.  He is bittersweet about retiring in 8 days.   8/13/2024 OTV: 4/30 to the Head and Neck: Patient reports doing well. He has no complaints of trouble swallowing. Tolerating a regular diet. He complains of neck and tongue numbness since surgery.  We reviewed skin care. Pt verbalized understanding. He continues to work for [place] [employer]. Plans to retire in two weeks.

## 2024-08-21 ENCOUNTER — OUTPATIENT (OUTPATIENT)
Dept: OUTPATIENT SERVICES | Facility: HOSPITAL | Age: 54
LOS: 1 days | Discharge: HOME | End: 2024-08-21

## 2024-08-21 DIAGNOSIS — Z98.890 OTHER SPECIFIED POSTPROCEDURAL STATES: Chronic | ICD-10-CM

## 2024-08-21 DIAGNOSIS — C77.0 SECONDARY AND UNSPECIFIED MALIGNANT NEOPLASM OF LYMPH NODES OF HEAD, FACE AND NECK: ICD-10-CM

## 2024-08-21 DIAGNOSIS — M26.10 UNSPECIFIED ANOMALY OF JAW-CRANIAL BASE RELATIONSHIP: Chronic | ICD-10-CM

## 2024-08-21 DIAGNOSIS — Z96.641 PRESENCE OF RIGHT ARTIFICIAL HIP JOINT: Chronic | ICD-10-CM

## 2024-08-21 DIAGNOSIS — J34.2 DEVIATED NASAL SEPTUM: Chronic | ICD-10-CM

## 2024-08-21 DIAGNOSIS — C02.1 MALIGNANT NEOPLASM OF BORDER OF TONGUE: ICD-10-CM

## 2024-08-21 DIAGNOSIS — Z90.49 ACQUIRED ABSENCE OF OTHER SPECIFIED PARTS OF DIGESTIVE TRACT: Chronic | ICD-10-CM

## 2024-08-21 PROCEDURE — 77387B: CUSTOM | Mod: 26

## 2024-08-22 ENCOUNTER — OUTPATIENT (OUTPATIENT)
Dept: OUTPATIENT SERVICES | Facility: HOSPITAL | Age: 54
LOS: 1 days | Discharge: HOME | End: 2024-08-22

## 2024-08-22 DIAGNOSIS — Z90.49 ACQUIRED ABSENCE OF OTHER SPECIFIED PARTS OF DIGESTIVE TRACT: Chronic | ICD-10-CM

## 2024-08-22 DIAGNOSIS — Z98.890 OTHER SPECIFIED POSTPROCEDURAL STATES: Chronic | ICD-10-CM

## 2024-08-22 DIAGNOSIS — S49.90XA UNSPECIFIED INJURY OF SHOULDER AND UPPER ARM, UNSPECIFIED ARM, INITIAL ENCOUNTER: Chronic | ICD-10-CM

## 2024-08-22 DIAGNOSIS — S99.911A UNSPECIFIED INJURY OF RIGHT ANKLE, INITIAL ENCOUNTER: Chronic | ICD-10-CM

## 2024-08-22 PROCEDURE — 77427 RADIATION TX MANAGEMENT X5: CPT

## 2024-08-22 PROCEDURE — 77387B: CUSTOM | Mod: 26

## 2024-08-23 ENCOUNTER — OUTPATIENT (OUTPATIENT)
Dept: OUTPATIENT SERVICES | Facility: HOSPITAL | Age: 54
LOS: 1 days | Discharge: HOME | End: 2024-08-23

## 2024-08-23 DIAGNOSIS — Z90.49 ACQUIRED ABSENCE OF OTHER SPECIFIED PARTS OF DIGESTIVE TRACT: Chronic | ICD-10-CM

## 2024-08-23 DIAGNOSIS — C77.0 SECONDARY AND UNSPECIFIED MALIGNANT NEOPLASM OF LYMPH NODES OF HEAD, FACE AND NECK: ICD-10-CM

## 2024-08-23 DIAGNOSIS — J34.2 DEVIATED NASAL SEPTUM: Chronic | ICD-10-CM

## 2024-08-23 DIAGNOSIS — Z98.890 OTHER SPECIFIED POSTPROCEDURAL STATES: Chronic | ICD-10-CM

## 2024-08-23 DIAGNOSIS — C02.1 MALIGNANT NEOPLASM OF BORDER OF TONGUE: ICD-10-CM

## 2024-08-23 DIAGNOSIS — Z96.641 PRESENCE OF RIGHT ARTIFICIAL HIP JOINT: Chronic | ICD-10-CM

## 2024-08-23 DIAGNOSIS — S99.911A UNSPECIFIED INJURY OF RIGHT ANKLE, INITIAL ENCOUNTER: Chronic | ICD-10-CM

## 2024-08-23 DIAGNOSIS — S49.90XA UNSPECIFIED INJURY OF SHOULDER AND UPPER ARM, UNSPECIFIED ARM, INITIAL ENCOUNTER: Chronic | ICD-10-CM

## 2024-08-23 DIAGNOSIS — M26.10 UNSPECIFIED ANOMALY OF JAW-CRANIAL BASE RELATIONSHIP: Chronic | ICD-10-CM

## 2024-08-26 ENCOUNTER — OUTPATIENT (OUTPATIENT)
Dept: OUTPATIENT SERVICES | Facility: HOSPITAL | Age: 54
LOS: 1 days | Discharge: HOME | End: 2024-08-26

## 2024-08-26 DIAGNOSIS — J34.2 DEVIATED NASAL SEPTUM: Chronic | ICD-10-CM

## 2024-08-26 DIAGNOSIS — Z98.890 OTHER SPECIFIED POSTPROCEDURAL STATES: Chronic | ICD-10-CM

## 2024-08-26 DIAGNOSIS — Z96.641 PRESENCE OF RIGHT ARTIFICIAL HIP JOINT: Chronic | ICD-10-CM

## 2024-08-26 DIAGNOSIS — S99.911A UNSPECIFIED INJURY OF RIGHT ANKLE, INITIAL ENCOUNTER: Chronic | ICD-10-CM

## 2024-08-26 DIAGNOSIS — C02.1 MALIGNANT NEOPLASM OF BORDER OF TONGUE: ICD-10-CM

## 2024-08-26 DIAGNOSIS — Z90.49 ACQUIRED ABSENCE OF OTHER SPECIFIED PARTS OF DIGESTIVE TRACT: Chronic | ICD-10-CM

## 2024-08-26 DIAGNOSIS — C77.0 SECONDARY AND UNSPECIFIED MALIGNANT NEOPLASM OF LYMPH NODES OF HEAD, FACE AND NECK: ICD-10-CM

## 2024-08-26 DIAGNOSIS — S49.90XA UNSPECIFIED INJURY OF SHOULDER AND UPPER ARM, UNSPECIFIED ARM, INITIAL ENCOUNTER: Chronic | ICD-10-CM

## 2024-08-26 DIAGNOSIS — M26.10 UNSPECIFIED ANOMALY OF JAW-CRANIAL BASE RELATIONSHIP: Chronic | ICD-10-CM

## 2024-08-26 PROCEDURE — 77387B: CUSTOM | Mod: 26

## 2024-08-27 ENCOUNTER — NON-APPOINTMENT (OUTPATIENT)
Age: 54
End: 2024-08-27

## 2024-08-27 ENCOUNTER — OUTPATIENT (OUTPATIENT)
Dept: OUTPATIENT SERVICES | Facility: HOSPITAL | Age: 54
LOS: 1 days | Discharge: HOME | End: 2024-08-27

## 2024-08-27 VITALS
TEMPERATURE: 97.6 F | RESPIRATION RATE: 16 BRPM | HEART RATE: 73 BPM | OXYGEN SATURATION: 98 % | DIASTOLIC BLOOD PRESSURE: 63 MMHG | SYSTOLIC BLOOD PRESSURE: 101 MMHG | WEIGHT: 199.6 LBS | BODY MASS INDEX: 28.64 KG/M2

## 2024-08-27 DIAGNOSIS — J34.2 DEVIATED NASAL SEPTUM: Chronic | ICD-10-CM

## 2024-08-27 DIAGNOSIS — M26.10 UNSPECIFIED ANOMALY OF JAW-CRANIAL BASE RELATIONSHIP: Chronic | ICD-10-CM

## 2024-08-27 DIAGNOSIS — C02.1 MALIGNANT NEOPLASM OF BORDER OF TONGUE: ICD-10-CM

## 2024-08-27 DIAGNOSIS — C77.0 SECONDARY AND UNSPECIFIED MALIGNANT NEOPLASM OF LYMPH NODES OF HEAD, FACE AND NECK: ICD-10-CM

## 2024-08-27 DIAGNOSIS — S49.90XA UNSPECIFIED INJURY OF SHOULDER AND UPPER ARM, UNSPECIFIED ARM, INITIAL ENCOUNTER: Chronic | ICD-10-CM

## 2024-08-27 DIAGNOSIS — Z90.49 ACQUIRED ABSENCE OF OTHER SPECIFIED PARTS OF DIGESTIVE TRACT: Chronic | ICD-10-CM

## 2024-08-27 DIAGNOSIS — Z96.641 PRESENCE OF RIGHT ARTIFICIAL HIP JOINT: Chronic | ICD-10-CM

## 2024-08-27 DIAGNOSIS — Z98.890 OTHER SPECIFIED POSTPROCEDURAL STATES: Chronic | ICD-10-CM

## 2024-08-27 DIAGNOSIS — S99.911A UNSPECIFIED INJURY OF RIGHT ANKLE, INITIAL ENCOUNTER: Chronic | ICD-10-CM

## 2024-08-27 PROCEDURE — 77387B: CUSTOM | Mod: 26

## 2024-08-27 RX ORDER — GABAPENTIN 250 MG/5ML
250 SOLUTION ORAL 3 TIMES DAILY
Qty: 1 | Refills: 2 | Status: ACTIVE | COMMUNITY
Start: 2024-08-27 | End: 1900-01-01

## 2024-08-27 RX ORDER — DIPHENHYDRAMINE HYDROCHLORIDE AND LIDOCAINE HYDROCHLORIDE AND ALUMINUM HYDROXIDE AND MAGNESIUM HYDRO
KIT 4 TIMES DAILY
Qty: 1 | Refills: 2 | Status: ACTIVE | COMMUNITY
Start: 2024-08-27 | End: 1900-01-01

## 2024-08-28 ENCOUNTER — OUTPATIENT (OUTPATIENT)
Dept: OUTPATIENT SERVICES | Facility: HOSPITAL | Age: 54
LOS: 1 days | Discharge: HOME | End: 2024-08-28

## 2024-08-28 DIAGNOSIS — S99.911A UNSPECIFIED INJURY OF RIGHT ANKLE, INITIAL ENCOUNTER: Chronic | ICD-10-CM

## 2024-08-28 DIAGNOSIS — Z90.49 ACQUIRED ABSENCE OF OTHER SPECIFIED PARTS OF DIGESTIVE TRACT: Chronic | ICD-10-CM

## 2024-08-28 DIAGNOSIS — J34.2 DEVIATED NASAL SEPTUM: Chronic | ICD-10-CM

## 2024-08-28 DIAGNOSIS — M26.10 UNSPECIFIED ANOMALY OF JAW-CRANIAL BASE RELATIONSHIP: Chronic | ICD-10-CM

## 2024-08-28 DIAGNOSIS — Z96.641 PRESENCE OF RIGHT ARTIFICIAL HIP JOINT: Chronic | ICD-10-CM

## 2024-08-28 PROCEDURE — 77387B: CUSTOM | Mod: 26

## 2024-08-28 NOTE — PHYSICAL EXAM
[General Appearance - Well Developed] : well developed [General Appearance - Alert] : alert [General Appearance - In No Acute Distress] : in no acute distress [] : no respiratory distress [Oriented To Time, Place, And Person] : oriented to person, place, and time [de-identified] : mild mucosites, no thrush evident [de-identified] : mild erythema

## 2024-08-28 NOTE — HISTORY OF PRESENT ILLNESS
[FreeTextEntry1] : 8/27/2024 OTV 13/30 treatments to the head and neck: Patient doing well. She has some mild mouth pain when he eats and drinks. He takes some Tylenol at times and it helps somewhat. He was prescribed Nystatin last week for possible thrush, but he feels it has not helped much. He takes some supplement shakes along with whatever he can swallow such as scrambled eggs, mashed potatoes, farina or pasta. Weight is stable. He has some fatigue as well. He will start taking Gabapentin and magic mouthwash. He moisturizes his neck daily.   08/20/2024 OTV 9/30 treatments to the head & neck. Mr. Love reports "ok". His chief concern is small mouth sores starting inside his mouth. He avoids spicy, hot food and eats mostly a soft diet maintained at room temperature. Nutritionist Flaca Nolan went over a diet plan with the patient which includes protein supplements.  He is bittersweet about retiring in 8 days.   8/13/2024 OTV: 4/30 to the Head and Neck: Patient reports doing well. He has no complaints of trouble swallowing. Tolerating a regular diet. He complains of neck and tongue numbness since surgery.  We reviewed skin care. Pt verbalized understanding. He continues to work for [place] [employer]. Plans to retire in two weeks.

## 2024-08-28 NOTE — REVIEW OF SYSTEMS
[Dysphagia: Grade 1 - Symptomatic, able to eat regular diet] : Dysphagia: Grade 1 - Symptomatic, able to eat regular diet [Nausea: Grade 0] : Nausea: Grade 0 [Vomiting: Grade 0] : Vomiting: Grade 0 [Fatigue: Grade 1 - Fatigue relieved by rest] : Fatigue: Grade 1 - Fatigue relieved by rest [Xerostomia: Grade 1 - Symptomatic (e.g., dry or thick saliva) without significant dietary alteration; unstimulated saliva flow >0.2 ml/min] : Xerostomia: Grade 1 - Symptomatic (e.g., dry or thick saliva) without significant dietary alteration; unstimulated saliva flow >0.2 ml/min [Oral Pain: Grade 1 - Mild pain] : Oral Pain: Grade 1 - Mild pain [Dysgeusia: Grade 1- Altered taste but no change in diet] : Dysgeusia: Grade 1 - Altered taste but no change in diet [Dermatitis Radiation: Grade 0] : Dermatitis Radiation: Grade 0 [de-identified] : tongue numb since surgery

## 2024-08-28 NOTE — DISEASE MANAGEMENT
[Pathological] : TNM Stage: p [N/A] : Currently not applicable [TTNM] : 1 [NTNM] : 2b [MTNM] : x [de-identified] : 2606cGy [de-identified] : 6000cGy [de-identified] : Head and Neck Head and Neck

## 2024-08-28 NOTE — REVIEW OF SYSTEMS
[Dysphagia: Grade 1 - Symptomatic, able to eat regular diet] : Dysphagia: Grade 1 - Symptomatic, able to eat regular diet [Nausea: Grade 0] : Nausea: Grade 0 [Vomiting: Grade 0] : Vomiting: Grade 0 [Fatigue: Grade 1 - Fatigue relieved by rest] : Fatigue: Grade 1 - Fatigue relieved by rest [Xerostomia: Grade 1 - Symptomatic (e.g., dry or thick saliva) without significant dietary alteration; unstimulated saliva flow >0.2 ml/min] : Xerostomia: Grade 1 - Symptomatic (e.g., dry or thick saliva) without significant dietary alteration; unstimulated saliva flow >0.2 ml/min [Oral Pain: Grade 1 - Mild pain] : Oral Pain: Grade 1 - Mild pain [Dysgeusia: Grade 1- Altered taste but no change in diet] : Dysgeusia: Grade 1 - Altered taste but no change in diet [Dermatitis Radiation: Grade 0] : Dermatitis Radiation: Grade 0 [de-identified] : tongue numb since surgery

## 2024-08-28 NOTE — PHYSICAL EXAM
[General Appearance - Well Developed] : well developed [General Appearance - Alert] : alert [General Appearance - In No Acute Distress] : in no acute distress [] : no respiratory distress [Oriented To Time, Place, And Person] : oriented to person, place, and time [de-identified] : mild mucosites, no thrush evident [de-identified] : mild erythema

## 2024-08-28 NOTE — REVIEW OF SYSTEMS
[Dysphagia: Grade 1 - Symptomatic, able to eat regular diet] : Dysphagia: Grade 1 - Symptomatic, able to eat regular diet [Nausea: Grade 0] : Nausea: Grade 0 [Vomiting: Grade 0] : Vomiting: Grade 0 [Fatigue: Grade 1 - Fatigue relieved by rest] : Fatigue: Grade 1 - Fatigue relieved by rest [Xerostomia: Grade 1 - Symptomatic (e.g., dry or thick saliva) without significant dietary alteration; unstimulated saliva flow >0.2 ml/min] : Xerostomia: Grade 1 - Symptomatic (e.g., dry or thick saliva) without significant dietary alteration; unstimulated saliva flow >0.2 ml/min [Oral Pain: Grade 1 - Mild pain] : Oral Pain: Grade 1 - Mild pain [Dysgeusia: Grade 1- Altered taste but no change in diet] : Dysgeusia: Grade 1 - Altered taste but no change in diet [Dermatitis Radiation: Grade 0] : Dermatitis Radiation: Grade 0 [de-identified] : tongue numb since surgery

## 2024-08-28 NOTE — PHYSICAL EXAM
[General Appearance - Well Developed] : well developed [General Appearance - Alert] : alert [General Appearance - In No Acute Distress] : in no acute distress [] : no respiratory distress [Oriented To Time, Place, And Person] : oriented to person, place, and time [de-identified] : mild mucosites, no thrush evident [de-identified] : mild erythema

## 2024-08-28 NOTE — DISEASE MANAGEMENT
[Pathological] : TNM Stage: p [N/A] : Currently not applicable [TTNM] : 1 [NTNM] : 2b [MTNM] : x [de-identified] : 2602cGy [de-identified] : 6000cGy [de-identified] : Head and Neck Head and Neck

## 2024-08-28 NOTE — DISEASE MANAGEMENT
[Pathological] : TNM Stage: p [N/A] : Currently not applicable [TTNM] : 1 [NTNM] : 2b [MTNM] : x [de-identified] : 2603cGy [de-identified] : 6000cGy [de-identified] : Head and Neck Head and Neck

## 2024-08-29 ENCOUNTER — OUTPATIENT (OUTPATIENT)
Dept: OUTPATIENT SERVICES | Facility: HOSPITAL | Age: 54
LOS: 1 days | Discharge: HOME | End: 2024-08-29

## 2024-08-29 DIAGNOSIS — S49.90XA UNSPECIFIED INJURY OF SHOULDER AND UPPER ARM, UNSPECIFIED ARM, INITIAL ENCOUNTER: Chronic | ICD-10-CM

## 2024-08-29 DIAGNOSIS — Z90.49 ACQUIRED ABSENCE OF OTHER SPECIFIED PARTS OF DIGESTIVE TRACT: Chronic | ICD-10-CM

## 2024-08-29 DIAGNOSIS — C02.1 MALIGNANT NEOPLASM OF BORDER OF TONGUE: ICD-10-CM

## 2024-08-29 DIAGNOSIS — M26.10 UNSPECIFIED ANOMALY OF JAW-CRANIAL BASE RELATIONSHIP: Chronic | ICD-10-CM

## 2024-08-29 DIAGNOSIS — Z98.890 OTHER SPECIFIED POSTPROCEDURAL STATES: Chronic | ICD-10-CM

## 2024-08-29 DIAGNOSIS — Z96.641 PRESENCE OF RIGHT ARTIFICIAL HIP JOINT: Chronic | ICD-10-CM

## 2024-08-29 DIAGNOSIS — J34.2 DEVIATED NASAL SEPTUM: Chronic | ICD-10-CM

## 2024-08-29 DIAGNOSIS — S99.911A UNSPECIFIED INJURY OF RIGHT ANKLE, INITIAL ENCOUNTER: Chronic | ICD-10-CM

## 2024-08-29 PROCEDURE — 77427 RADIATION TX MANAGEMENT X5: CPT

## 2024-08-29 PROCEDURE — 77387B: CUSTOM | Mod: 26

## 2024-08-30 ENCOUNTER — OUTPATIENT (OUTPATIENT)
Dept: OUTPATIENT SERVICES | Facility: HOSPITAL | Age: 54
LOS: 1 days | Discharge: HOME | End: 2024-08-30

## 2024-08-30 DIAGNOSIS — C02.1 MALIGNANT NEOPLASM OF BORDER OF TONGUE: ICD-10-CM

## 2024-08-30 DIAGNOSIS — S99.911A UNSPECIFIED INJURY OF RIGHT ANKLE, INITIAL ENCOUNTER: Chronic | ICD-10-CM

## 2024-08-30 DIAGNOSIS — M26.10 UNSPECIFIED ANOMALY OF JAW-CRANIAL BASE RELATIONSHIP: Chronic | ICD-10-CM

## 2024-08-30 DIAGNOSIS — Z98.890 OTHER SPECIFIED POSTPROCEDURAL STATES: Chronic | ICD-10-CM

## 2024-08-30 DIAGNOSIS — J34.2 DEVIATED NASAL SEPTUM: Chronic | ICD-10-CM

## 2024-08-30 DIAGNOSIS — Z90.49 ACQUIRED ABSENCE OF OTHER SPECIFIED PARTS OF DIGESTIVE TRACT: Chronic | ICD-10-CM

## 2024-08-30 DIAGNOSIS — Z96.641 PRESENCE OF RIGHT ARTIFICIAL HIP JOINT: Chronic | ICD-10-CM

## 2024-08-30 DIAGNOSIS — S49.90XA UNSPECIFIED INJURY OF SHOULDER AND UPPER ARM, UNSPECIFIED ARM, INITIAL ENCOUNTER: Chronic | ICD-10-CM

## 2024-09-03 ENCOUNTER — NON-APPOINTMENT (OUTPATIENT)
Age: 54
End: 2024-09-03

## 2024-09-03 ENCOUNTER — OUTPATIENT (OUTPATIENT)
Dept: OUTPATIENT SERVICES | Facility: HOSPITAL | Age: 54
LOS: 1 days | Discharge: HOME | End: 2024-09-03
Payer: COMMERCIAL

## 2024-09-03 VITALS
BODY MASS INDEX: 28.3 KG/M2 | SYSTOLIC BLOOD PRESSURE: 95 MMHG | RESPIRATION RATE: 16 BRPM | HEART RATE: 74 BPM | DIASTOLIC BLOOD PRESSURE: 59 MMHG | OXYGEN SATURATION: 98 % | TEMPERATURE: 98.1 F | WEIGHT: 197.25 LBS

## 2024-09-03 DIAGNOSIS — Z98.890 OTHER SPECIFIED POSTPROCEDURAL STATES: Chronic | ICD-10-CM

## 2024-09-03 DIAGNOSIS — Z96.641 PRESENCE OF RIGHT ARTIFICIAL HIP JOINT: Chronic | ICD-10-CM

## 2024-09-03 DIAGNOSIS — C02.1 MALIGNANT NEOPLASM OF BORDER OF TONGUE: ICD-10-CM

## 2024-09-03 DIAGNOSIS — S99.911A UNSPECIFIED INJURY OF RIGHT ANKLE, INITIAL ENCOUNTER: Chronic | ICD-10-CM

## 2024-09-03 DIAGNOSIS — J34.2 DEVIATED NASAL SEPTUM: Chronic | ICD-10-CM

## 2024-09-03 DIAGNOSIS — S49.90XA UNSPECIFIED INJURY OF SHOULDER AND UPPER ARM, UNSPECIFIED ARM, INITIAL ENCOUNTER: Chronic | ICD-10-CM

## 2024-09-03 DIAGNOSIS — Z90.49 ACQUIRED ABSENCE OF OTHER SPECIFIED PARTS OF DIGESTIVE TRACT: Chronic | ICD-10-CM

## 2024-09-03 DIAGNOSIS — M26.10 UNSPECIFIED ANOMALY OF JAW-CRANIAL BASE RELATIONSHIP: Chronic | ICD-10-CM

## 2024-09-03 PROCEDURE — 77387B: CUSTOM | Mod: 26

## 2024-09-03 PROCEDURE — 77386: CPT

## 2024-09-03 NOTE — PHYSICAL EXAM
[General Appearance - Well Developed] : well developed [General Appearance - In No Acute Distress] : in no acute distress [] : no respiratory distress [Oriented To Time, Place, And Person] : oriented to person, place, and time [de-identified] : oral mucosites present

## 2024-09-03 NOTE — REVIEW OF SYSTEMS
[Dysphagia: Grade 1 - Symptomatic, able to eat regular diet] : Dysphagia: Grade 1 - Symptomatic, able to eat regular diet [Nausea: Grade 0] : Nausea: Grade 0 [Vomiting: Grade 0] : Vomiting: Grade 0 [Fatigue: Grade 1 - Fatigue relieved by rest] : Fatigue: Grade 1 - Fatigue relieved by rest [Xerostomia: Grade 1 - Symptomatic (e.g., dry or thick saliva) without significant dietary alteration; unstimulated saliva flow >0.2 ml/min] : Xerostomia: Grade 1 - Symptomatic (e.g., dry or thick saliva) without significant dietary alteration; unstimulated saliva flow >0.2 ml/min [Oral Pain: Grade 1 - Mild pain] : Oral Pain: Grade 1 - Mild pain [Dysgeusia: Grade 1- Altered taste but no change in diet] : Dysgeusia: Grade 1 - Altered taste but no change in diet [Dermatitis Radiation: Grade 0] : Dermatitis Radiation: Grade 0 [Mucositis Oral: Grade 1 - Asymptomatic or mild symptoms; intervention not indicated] : Mucositis Oral: Grade 1 - Asymptomatic or mild symptoms; intervention not indicated [de-identified] : tongue numb since surgery

## 2024-09-03 NOTE — DISEASE MANAGEMENT
[Pathological] : TNM Stage: p [N/A] : Currently not applicable [TTNM] : 1 [NTNM] : 2b [MTNM] : x [de-identified] : 3600cGy [de-identified] : 6000cGy [de-identified] : Head and Neck Head and Neck

## 2024-09-03 NOTE — HISTORY OF PRESENT ILLNESS
[FreeTextEntry1] : 9/3/2024 OTV 18/30 treatments to the head and neck: Patient feeling well. Mild fatigue. He feels like his throat is "swollen" and it is hard to get food down. He basically has shakes and some broth. He has lost 2 lbs this week. His glucose has been running about 170's before his meals/shakes this week. He takes the magic mouthwash 4 x day and gabapentin 300mg liquid po q8hrs. He has mouth pain. He will also try Advil and Tylenol for pain. Patient will also meet with Flaca today as well.  8/27/2024 OTV 13/30 treatments to the head and neck: Patient doing well. She has some mild mouth pain when he eats and drinks. He takes some Tylenol at times and it helps somewhat. He was prescribed Nystatin last week for possible thrush, but he feels it has not helped much. He takes some supplement shakes along with whatever he can swallow such as scrambled eggs, mashed potatoes, farina or pasta. Weight is stable. He has some fatigue as well. He will start taking Gabapentin and magic mouthwash. He moisturizes his neck daily.   08/20/2024 OTV 9/30 treatments to the head & neck. Mr. Love reports "ok". His chief concern is small mouth sores starting inside his mouth. He avoids spicy, hot food and eats mostly a soft diet maintained at room temperature. Nutritionist Flaca Nolan went over a diet plan with the patient which includes protein supplements.  He is bittersweet about retiring in 8 days.   8/13/2024 OTV: 4/30 to the Head and Neck: Patient reports doing well. He has no complaints of trouble swallowing. Tolerating a regular diet. He complains of neck and tongue numbness since surgery.  We reviewed skin care. Pt verbalized understanding. He continues to work for [place] [employer]. Plans to retire in two weeks.

## 2024-09-03 NOTE — VITALS
[Maximal Pain Intensity: 5/10] : 5/10 [Pain Location: ___] : Pain Location: [unfilled] [OTC] : OTC [90: Able to carry normal activity; minor signs or symptoms of disease.] : 90: Able to carry normal activity; minor signs or symptoms of disease.

## 2024-09-04 ENCOUNTER — OUTPATIENT (OUTPATIENT)
Dept: OUTPATIENT SERVICES | Facility: HOSPITAL | Age: 54
LOS: 1 days | Discharge: HOME | End: 2024-09-04

## 2024-09-04 ENCOUNTER — NON-APPOINTMENT (OUTPATIENT)
Age: 54
End: 2024-09-04

## 2024-09-04 DIAGNOSIS — Z98.890 OTHER SPECIFIED POSTPROCEDURAL STATES: Chronic | ICD-10-CM

## 2024-09-04 DIAGNOSIS — Z96.641 PRESENCE OF RIGHT ARTIFICIAL HIP JOINT: Chronic | ICD-10-CM

## 2024-09-04 DIAGNOSIS — S99.911A UNSPECIFIED INJURY OF RIGHT ANKLE, INITIAL ENCOUNTER: Chronic | ICD-10-CM

## 2024-09-04 DIAGNOSIS — S49.90XA UNSPECIFIED INJURY OF SHOULDER AND UPPER ARM, UNSPECIFIED ARM, INITIAL ENCOUNTER: Chronic | ICD-10-CM

## 2024-09-04 DIAGNOSIS — Z90.49 ACQUIRED ABSENCE OF OTHER SPECIFIED PARTS OF DIGESTIVE TRACT: Chronic | ICD-10-CM

## 2024-09-04 DIAGNOSIS — J34.2 DEVIATED NASAL SEPTUM: Chronic | ICD-10-CM

## 2024-09-04 DIAGNOSIS — M26.10 UNSPECIFIED ANOMALY OF JAW-CRANIAL BASE RELATIONSHIP: Chronic | ICD-10-CM

## 2024-09-04 PROCEDURE — 77387B: CUSTOM | Mod: 26

## 2024-09-05 ENCOUNTER — OUTPATIENT (OUTPATIENT)
Dept: OUTPATIENT SERVICES | Facility: HOSPITAL | Age: 54
LOS: 1 days | Discharge: HOME | End: 2024-09-05

## 2024-09-05 DIAGNOSIS — S49.90XA UNSPECIFIED INJURY OF SHOULDER AND UPPER ARM, UNSPECIFIED ARM, INITIAL ENCOUNTER: Chronic | ICD-10-CM

## 2024-09-05 DIAGNOSIS — Z96.641 PRESENCE OF RIGHT ARTIFICIAL HIP JOINT: Chronic | ICD-10-CM

## 2024-09-05 DIAGNOSIS — Z98.890 OTHER SPECIFIED POSTPROCEDURAL STATES: Chronic | ICD-10-CM

## 2024-09-05 PROCEDURE — 77387B: CUSTOM | Mod: 26

## 2024-09-06 ENCOUNTER — OUTPATIENT (OUTPATIENT)
Dept: OUTPATIENT SERVICES | Facility: HOSPITAL | Age: 54
LOS: 1 days | Discharge: HOME | End: 2024-09-06

## 2024-09-06 DIAGNOSIS — S49.90XA UNSPECIFIED INJURY OF SHOULDER AND UPPER ARM, UNSPECIFIED ARM, INITIAL ENCOUNTER: Chronic | ICD-10-CM

## 2024-09-06 DIAGNOSIS — C02.1 MALIGNANT NEOPLASM OF BORDER OF TONGUE: ICD-10-CM

## 2024-09-06 DIAGNOSIS — Z98.890 OTHER SPECIFIED POSTPROCEDURAL STATES: Chronic | ICD-10-CM

## 2024-09-06 DIAGNOSIS — C77.0 SECONDARY AND UNSPECIFIED MALIGNANT NEOPLASM OF LYMPH NODES OF HEAD, FACE AND NECK: ICD-10-CM

## 2024-09-06 DIAGNOSIS — M26.10 UNSPECIFIED ANOMALY OF JAW-CRANIAL BASE RELATIONSHIP: Chronic | ICD-10-CM

## 2024-09-06 DIAGNOSIS — Z90.49 ACQUIRED ABSENCE OF OTHER SPECIFIED PARTS OF DIGESTIVE TRACT: Chronic | ICD-10-CM

## 2024-09-06 DIAGNOSIS — S99.911A UNSPECIFIED INJURY OF RIGHT ANKLE, INITIAL ENCOUNTER: Chronic | ICD-10-CM

## 2024-09-06 DIAGNOSIS — Z96.641 PRESENCE OF RIGHT ARTIFICIAL HIP JOINT: Chronic | ICD-10-CM

## 2024-09-06 DIAGNOSIS — J34.2 DEVIATED NASAL SEPTUM: Chronic | ICD-10-CM

## 2024-09-06 PROCEDURE — 77427 RADIATION TX MANAGEMENT X5: CPT

## 2024-09-09 ENCOUNTER — OUTPATIENT (OUTPATIENT)
Dept: OUTPATIENT SERVICES | Facility: HOSPITAL | Age: 54
LOS: 1 days | Discharge: HOME | End: 2024-09-09

## 2024-09-09 DIAGNOSIS — Z98.890 OTHER SPECIFIED POSTPROCEDURAL STATES: Chronic | ICD-10-CM

## 2024-09-09 DIAGNOSIS — C77.0 SECONDARY AND UNSPECIFIED MALIGNANT NEOPLASM OF LYMPH NODES OF HEAD, FACE AND NECK: ICD-10-CM

## 2024-09-09 DIAGNOSIS — J34.2 DEVIATED NASAL SEPTUM: Chronic | ICD-10-CM

## 2024-09-09 DIAGNOSIS — C02.1 MALIGNANT NEOPLASM OF BORDER OF TONGUE: ICD-10-CM

## 2024-09-09 DIAGNOSIS — Z90.49 ACQUIRED ABSENCE OF OTHER SPECIFIED PARTS OF DIGESTIVE TRACT: Chronic | ICD-10-CM

## 2024-09-09 DIAGNOSIS — Z96.641 PRESENCE OF RIGHT ARTIFICIAL HIP JOINT: Chronic | ICD-10-CM

## 2024-09-09 PROCEDURE — 77387B: CUSTOM | Mod: 26

## 2024-09-10 ENCOUNTER — OUTPATIENT (OUTPATIENT)
Dept: OUTPATIENT SERVICES | Facility: HOSPITAL | Age: 54
LOS: 1 days | Discharge: HOME | End: 2024-09-10

## 2024-09-10 ENCOUNTER — NON-APPOINTMENT (OUTPATIENT)
Age: 54
End: 2024-09-10

## 2024-09-10 VITALS
BODY MASS INDEX: 27.69 KG/M2 | HEART RATE: 75 BPM | TEMPERATURE: 98.1 F | RESPIRATION RATE: 16 BRPM | WEIGHT: 193 LBS | SYSTOLIC BLOOD PRESSURE: 105 MMHG | DIASTOLIC BLOOD PRESSURE: 71 MMHG | OXYGEN SATURATION: 98 %

## 2024-09-10 DIAGNOSIS — S99.911A UNSPECIFIED INJURY OF RIGHT ANKLE, INITIAL ENCOUNTER: Chronic | ICD-10-CM

## 2024-09-10 DIAGNOSIS — Z98.890 OTHER SPECIFIED POSTPROCEDURAL STATES: Chronic | ICD-10-CM

## 2024-09-10 DIAGNOSIS — S49.90XA UNSPECIFIED INJURY OF SHOULDER AND UPPER ARM, UNSPECIFIED ARM, INITIAL ENCOUNTER: Chronic | ICD-10-CM

## 2024-09-10 DIAGNOSIS — M26.10 UNSPECIFIED ANOMALY OF JAW-CRANIAL BASE RELATIONSHIP: Chronic | ICD-10-CM

## 2024-09-10 PROCEDURE — 77387B: CUSTOM | Mod: 26

## 2024-09-10 NOTE — HISTORY OF PRESENT ILLNESS
[FreeTextEntry1] : Mr.Thomas Dia is 53yr old male here for initial consultation for malignant neoplasm of tongue, referred by ENT, Dr. Mason.  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 He was then sent to ENT, Dr Mason, for consultation in March/2024. Biopsy done in March showed "Fragment of infiltrative squamous cell carcinoma on anterior lateral tongue, P 40 positive, P16 negative". On May 8,2024 he underwent a right partial glossectomy with C02 laser with  and pathology showed "invasive squamous cell carcinoma (SCC), conventional type, measuring 8mm in greatest dimension with depth of invasion 5mm". Since there was 5mm DOI (depth of invasion), he then underwent a right neck dissection on June 21 with . Final path for neck dissection showed metastatic squamous cell carcinoma in right level 2 neck dissection, poorly differentiated present in 2 out of 28 lymph nodes (2/28). 2/46 total. The carcinoma measuring 11 mm in greatest dimension in a single focus, no extranodal extension identified. He has recovered well since surgery. He feels some mild swelling under his chin and right side since surgery and is in the process with meeting with OT. He also has some numbness on tongue and right neck area. He has returned back to work with [place] [employer]. He is also scheduled to retire in 6 weeks. He denies any dysphagia or trouble eating. Weight has been stable. He has past medical h/o HTN and diabetes (Insulin pump: Novolog). He has been cleared from his dentist and follows up on Monday once again to  his fluoride tray that was ordered.  Dentist: Dr Alisa Obando [phone]  Biopsy 3/25/2024: Surgical Pathology Report - Auth (Verified) Specimen(s) Submitted 1  Anterior lateral oral tongue 2  Posterior lateral oral tongue Final Diagnosis 1.  Anterior lateral oral tongue, biopsy: -Fragment of infiltrative squamous cell carcinoma with minimal keratinization.  See note below  2.  Posterior lateral oral tongue, biopsy: -Benign squamous mucosa showing marked lichenoid chronic  lymphocytic inflammation, no  dysplasia or  carcinoma seen.  Addendum Immunohistochemical studies were performed on block 1A at [facility], [address] [place] [address], [id number] (see NOTE).  The results are as follows: The carcinoma stains positive for p40, but negative for p16.  Clinical correlation is recommended.  Pathology 5/8/2024: Final Diagnosis 1.  Right partial glossectomy: -Invasive squamous cell carcinoma(SCC), conventional type, measuring 8 mm in greatest dimension  with depth of invasion 5 mm (on slide 1A); pT1. -All surgical margins of resection, free of carcinoma (  the SCC is at least 8 mm away from  peripheral mucosal margin and 6 mm from deep margin). -No clear-cut histologic evidence of lymphovascular invasion or extratumoral perineural invasion  seen. -The non-neoplastic squamous mucosa without significant pathologic change. -Focal minor salivary gland tissue also present, without significant pathologic change. -See synoptic summary below.  2.  Anterior dorsal margin for frozen section: -Benign squamous mucosa, no carcinoma identified.  3.  Posterior dorsal margin for frozen section: -Benign squamous mucosa, no carcinoma identified.  4.  Ventral tongue margin for frozen section: -Benign squamous mucosa, no carcinoma identified.  5.  Floor of mouth margin for frozen section: -Benign squamous mucosa, no carcinoma identified.  6.  Deep tongue margin for frozen section: -Benign minor salivary gland tissue, no carcinoma seen.  7.  Right posterior partial glossectomy: -Squamous mucosa showing non-keratinized dysplasia, mild to moderate, measuring 4 mm in greatest dimension. -The resection margin appearing free of the dysplasia (at least 3 mm away from peripheral mucosal margin).  Pathology: 6/21/2024: Final Diagnosis 1.  Right level 1 neck dissection: -Benign reactive lymph nodes with sinus histiocytosis (0/3), no carcinoma identified. -Benign salivary gland tissue, no carcinoma identified.  2.  Right level 2 neck dissection: -Metastatic squamous cell carcinoma, poorly differentiated present in 2 out of 28 lymph nodes (2/28). See note below -The carcinoma measuring 11 mm in greatest dimension in a single focus (on slide 2A), no extranodal extension identified.  3.  Right level 3 and 4 neck dissection: -Benign reactive lymph nodes with sinus histiocytosis (0/15), no carcinoma identified.  Note: In context with the patient's prior specimen ([id number]), the pathology staging is pT1, pN2b, pMn/a.

## 2024-09-10 NOTE — REVIEW OF SYSTEMS
[Dysphagia: Grade 1 - Symptomatic, able to eat regular diet] : Dysphagia: Grade 1 - Symptomatic, able to eat regular diet [Nausea: Grade 0] : Nausea: Grade 0 [Vomiting: Grade 0] : Vomiting: Grade 0 [Fatigue: Grade 1 - Fatigue relieved by rest] : Fatigue: Grade 1 - Fatigue relieved by rest [Mucositis Oral: Grade 1 - Asymptomatic or mild symptoms; intervention not indicated] : Mucositis Oral: Grade 1 - Asymptomatic or mild symptoms; intervention not indicated [Xerostomia: Grade 1 - Symptomatic (e.g., dry or thick saliva) without significant dietary alteration; unstimulated saliva flow >0.2 ml/min] : Xerostomia: Grade 1 - Symptomatic (e.g., dry or thick saliva) without significant dietary alteration; unstimulated saliva flow >0.2 ml/min [Oral Pain: Grade 1 - Mild pain] : Oral Pain: Grade 1 - Mild pain [Dysgeusia: Grade 1- Altered taste but no change in diet] : Dysgeusia: Grade 1 - Altered taste but no change in diet [Dermatitis Radiation: Grade 1 - Faint erythema or dry desquamation] : Dermatitis Radiation: Grade 1 - Faint erythema or dry desquamation [de-identified] : tongue numb since surgery

## 2024-09-10 NOTE — PHYSICAL EXAM
[General Appearance - Well Developed] : well developed [General Appearance - Alert] : alert [General Appearance - In No Acute Distress] : in no acute distress [] : no respiratory distress [de-identified] : mucosites noted orally

## 2024-09-10 NOTE — HISTORY OF PRESENT ILLNESS
[FreeTextEntry1] : Mr.Thomas Dia is 53yr old male here for initial consultation for malignant neoplasm of tongue, referred by ENT, Dr. Mason.  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 He was then sent to ENT, Dr Mason, for consultation in March/2024. Biopsy done in March showed "Fragment of infiltrative squamous cell carcinoma on anterior lateral tongue, P 40 positive, P16 negative". On May 8,2024 he underwent a right partial glossectomy with C02 laser with  and pathology showed "invasive squamous cell carcinoma (SCC), conventional type, measuring 8mm in greatest dimension with depth of invasion 5mm". Since there was 5mm DOI (depth of invasion), he then underwent a right neck dissection on June 21 with . Final path for neck dissection showed metastatic squamous cell carcinoma in right level 2 neck dissection, poorly differentiated present in 2 out of 28 lymph nodes (2/28). 2/46 total. The carcinoma measuring 11 mm in greatest dimension in a single focus, no extranodal extension identified. He has recovered well since surgery. He feels some mild swelling under his chin and right side since surgery and is in the process with meeting with OT. He also has some numbness on tongue and right neck area. He has returned back to work with [place] [employer]. He is also scheduled to retire in 6 weeks. He denies any dysphagia or trouble eating. Weight has been stable. He has past medical h/o HTN and diabetes (Insulin pump: Novolog). He has been cleared from his dentist and follows up on Monday once again to  his fluoride tray that was ordered.  Dentist: Dr Alisa Obando [phone]  Biopsy 3/25/2024: Surgical Pathology Report - Auth (Verified) Specimen(s) Submitted 1  Anterior lateral oral tongue 2  Posterior lateral oral tongue Final Diagnosis 1.  Anterior lateral oral tongue, biopsy: -Fragment of infiltrative squamous cell carcinoma with minimal keratinization.  See note below  2.  Posterior lateral oral tongue, biopsy: -Benign squamous mucosa showing marked lichenoid chronic  lymphocytic inflammation, no  dysplasia or  carcinoma seen.  Addendum Immunohistochemical studies were performed on block 1A at [facility], [address] [place] [address], [id number] (see NOTE).  The results are as follows: The carcinoma stains positive for p40, but negative for p16.  Clinical correlation is recommended.  Pathology 5/8/2024: Final Diagnosis 1.  Right partial glossectomy: -Invasive squamous cell carcinoma(SCC), conventional type, measuring 8 mm in greatest dimension  with depth of invasion 5 mm (on slide 1A); pT1. -All surgical margins of resection, free of carcinoma (  the SCC is at least 8 mm away from  peripheral mucosal margin and 6 mm from deep margin). -No clear-cut histologic evidence of lymphovascular invasion or extratumoral perineural invasion  seen. -The non-neoplastic squamous mucosa without significant pathologic change. -Focal minor salivary gland tissue also present, without significant pathologic change. -See synoptic summary below.  2.  Anterior dorsal margin for frozen section: -Benign squamous mucosa, no carcinoma identified.  3.  Posterior dorsal margin for frozen section: -Benign squamous mucosa, no carcinoma identified.  4.  Ventral tongue margin for frozen section: -Benign squamous mucosa, no carcinoma identified.  5.  Floor of mouth margin for frozen section: -Benign squamous mucosa, no carcinoma identified.  6.  Deep tongue margin for frozen section: -Benign minor salivary gland tissue, no carcinoma seen.  7.  Right posterior partial glossectomy: -Squamous mucosa showing non-keratinized dysplasia, mild to moderate, measuring 4 mm in greatest dimension. -The resection margin appearing free of the dysplasia (at least 3 mm away from peripheral mucosal margin).  Pathology: 6/21/2024: Final Diagnosis 1.  Right level 1 neck dissection: -Benign reactive lymph nodes with sinus histiocytosis (0/3), no carcinoma identified. -Benign salivary gland tissue, no carcinoma identified.  2.  Right level 2 neck dissection: -Metastatic squamous cell carcinoma, poorly differentiated present in 2 out of 28 lymph nodes (2/28). See note below -The carcinoma measuring 11 mm in greatest dimension in a single focus (on slide 2A), no extranodal extension identified.  3.  Right level 3 and 4 neck dissection: -Benign reactive lymph nodes with sinus histiocytosis (0/15), no carcinoma identified.  Note: In context with the patient's prior specimen ([id number]), the pathology staging is pT1, pN2b, pMn/a.

## 2024-09-10 NOTE — DISEASE MANAGEMENT
[Pathological] : TNM Stage: p [N/A] : Currently not applicable [TTNM] : 1 [NTNM] : 2b [MTNM] : x [de-identified] : 3600cGy [de-identified] : 6000cGy [de-identified] : Head and Neck Head and Neck

## 2024-09-10 NOTE — VITALS
[Maximal Pain Intensity: 5/10] : 5/10 [OTC] : OTC [NSAID/Non-Opioid] : NSAID/Non-Opioid [90: Able to carry normal activity; minor signs or symptoms of disease.] : 90: Able to carry normal activity; minor signs or symptoms of disease.

## 2024-09-10 NOTE — REVIEW OF SYSTEMS
[Dysphagia: Grade 1 - Symptomatic, able to eat regular diet] : Dysphagia: Grade 1 - Symptomatic, able to eat regular diet [Nausea: Grade 0] : Nausea: Grade 0 [Vomiting: Grade 0] : Vomiting: Grade 0 [Fatigue: Grade 1 - Fatigue relieved by rest] : Fatigue: Grade 1 - Fatigue relieved by rest [Mucositis Oral: Grade 1 - Asymptomatic or mild symptoms; intervention not indicated] : Mucositis Oral: Grade 1 - Asymptomatic or mild symptoms; intervention not indicated [Xerostomia: Grade 1 - Symptomatic (e.g., dry or thick saliva) without significant dietary alteration; unstimulated saliva flow >0.2 ml/min] : Xerostomia: Grade 1 - Symptomatic (e.g., dry or thick saliva) without significant dietary alteration; unstimulated saliva flow >0.2 ml/min [Oral Pain: Grade 1 - Mild pain] : Oral Pain: Grade 1 - Mild pain [Dysgeusia: Grade 1- Altered taste but no change in diet] : Dysgeusia: Grade 1 - Altered taste but no change in diet [Dermatitis Radiation: Grade 1 - Faint erythema or dry desquamation] : Dermatitis Radiation: Grade 1 - Faint erythema or dry desquamation [de-identified] : tongue numb since surgery

## 2024-09-10 NOTE — PHYSICAL EXAM
[General Appearance - Well Developed] : well developed [General Appearance - Alert] : alert [General Appearance - In No Acute Distress] : in no acute distress [] : no respiratory distress [de-identified] : mucosites noted orally

## 2024-09-10 NOTE — DISEASE MANAGEMENT
[Pathological] : TNM Stage: p [N/A] : Currently not applicable [TTNM] : 1 [NTNM] : 2b [MTNM] : x [de-identified] : 3600cGy [de-identified] : 6000cGy [de-identified] : Head and Neck Head and Neck

## 2024-09-11 ENCOUNTER — OUTPATIENT (OUTPATIENT)
Dept: OUTPATIENT SERVICES | Facility: HOSPITAL | Age: 54
LOS: 1 days | Discharge: HOME | End: 2024-09-11

## 2024-09-11 DIAGNOSIS — J34.2 DEVIATED NASAL SEPTUM: Chronic | ICD-10-CM

## 2024-09-11 DIAGNOSIS — C77.0 SECONDARY AND UNSPECIFIED MALIGNANT NEOPLASM OF LYMPH NODES OF HEAD, FACE AND NECK: ICD-10-CM

## 2024-09-11 DIAGNOSIS — Z98.890 OTHER SPECIFIED POSTPROCEDURAL STATES: Chronic | ICD-10-CM

## 2024-09-11 DIAGNOSIS — C02.1 MALIGNANT NEOPLASM OF BORDER OF TONGUE: ICD-10-CM

## 2024-09-11 DIAGNOSIS — S99.911A UNSPECIFIED INJURY OF RIGHT ANKLE, INITIAL ENCOUNTER: Chronic | ICD-10-CM

## 2024-09-11 PROCEDURE — 77387B: CUSTOM | Mod: 26

## 2024-09-12 ENCOUNTER — OUTPATIENT (OUTPATIENT)
Dept: OUTPATIENT SERVICES | Facility: HOSPITAL | Age: 54
LOS: 1 days | Discharge: HOME | End: 2024-09-12

## 2024-09-12 DIAGNOSIS — J34.2 DEVIATED NASAL SEPTUM: Chronic | ICD-10-CM

## 2024-09-12 DIAGNOSIS — Z90.49 ACQUIRED ABSENCE OF OTHER SPECIFIED PARTS OF DIGESTIVE TRACT: Chronic | ICD-10-CM

## 2024-09-12 DIAGNOSIS — M26.10 UNSPECIFIED ANOMALY OF JAW-CRANIAL BASE RELATIONSHIP: Chronic | ICD-10-CM

## 2024-09-12 DIAGNOSIS — S49.90XA UNSPECIFIED INJURY OF SHOULDER AND UPPER ARM, UNSPECIFIED ARM, INITIAL ENCOUNTER: Chronic | ICD-10-CM

## 2024-09-12 DIAGNOSIS — S99.911A UNSPECIFIED INJURY OF RIGHT ANKLE, INITIAL ENCOUNTER: Chronic | ICD-10-CM

## 2024-09-12 DIAGNOSIS — Z96.641 PRESENCE OF RIGHT ARTIFICIAL HIP JOINT: Chronic | ICD-10-CM

## 2024-09-12 DIAGNOSIS — Z98.890 OTHER SPECIFIED POSTPROCEDURAL STATES: Chronic | ICD-10-CM

## 2024-09-12 PROCEDURE — 77387B: CUSTOM | Mod: 26

## 2024-09-13 ENCOUNTER — OUTPATIENT (OUTPATIENT)
Dept: OUTPATIENT SERVICES | Facility: HOSPITAL | Age: 54
LOS: 1 days | Discharge: HOME | End: 2024-09-13

## 2024-09-13 DIAGNOSIS — Z90.49 ACQUIRED ABSENCE OF OTHER SPECIFIED PARTS OF DIGESTIVE TRACT: Chronic | ICD-10-CM

## 2024-09-13 DIAGNOSIS — M26.10 UNSPECIFIED ANOMALY OF JAW-CRANIAL BASE RELATIONSHIP: Chronic | ICD-10-CM

## 2024-09-13 DIAGNOSIS — S49.90XA UNSPECIFIED INJURY OF SHOULDER AND UPPER ARM, UNSPECIFIED ARM, INITIAL ENCOUNTER: Chronic | ICD-10-CM

## 2024-09-13 DIAGNOSIS — Z96.641 PRESENCE OF RIGHT ARTIFICIAL HIP JOINT: Chronic | ICD-10-CM

## 2024-09-13 DIAGNOSIS — C02.1 MALIGNANT NEOPLASM OF BORDER OF TONGUE: ICD-10-CM

## 2024-09-13 DIAGNOSIS — C77.0 SECONDARY AND UNSPECIFIED MALIGNANT NEOPLASM OF LYMPH NODES OF HEAD, FACE AND NECK: ICD-10-CM

## 2024-09-13 DIAGNOSIS — Z98.890 OTHER SPECIFIED POSTPROCEDURAL STATES: Chronic | ICD-10-CM

## 2024-09-13 PROCEDURE — 77427 RADIATION TX MANAGEMENT X5: CPT

## 2024-09-16 ENCOUNTER — OUTPATIENT (OUTPATIENT)
Dept: OUTPATIENT SERVICES | Facility: HOSPITAL | Age: 54
LOS: 1 days | Discharge: HOME | End: 2024-09-16

## 2024-09-16 DIAGNOSIS — Z90.49 ACQUIRED ABSENCE OF OTHER SPECIFIED PARTS OF DIGESTIVE TRACT: Chronic | ICD-10-CM

## 2024-09-16 DIAGNOSIS — Z98.890 OTHER SPECIFIED POSTPROCEDURAL STATES: Chronic | ICD-10-CM

## 2024-09-16 DIAGNOSIS — C02.1 MALIGNANT NEOPLASM OF BORDER OF TONGUE: ICD-10-CM

## 2024-09-16 DIAGNOSIS — J34.2 DEVIATED NASAL SEPTUM: Chronic | ICD-10-CM

## 2024-09-16 DIAGNOSIS — S49.90XA UNSPECIFIED INJURY OF SHOULDER AND UPPER ARM, UNSPECIFIED ARM, INITIAL ENCOUNTER: Chronic | ICD-10-CM

## 2024-09-16 DIAGNOSIS — S99.911A UNSPECIFIED INJURY OF RIGHT ANKLE, INITIAL ENCOUNTER: Chronic | ICD-10-CM

## 2024-09-16 DIAGNOSIS — Z96.641 PRESENCE OF RIGHT ARTIFICIAL HIP JOINT: Chronic | ICD-10-CM

## 2024-09-16 DIAGNOSIS — M26.10 UNSPECIFIED ANOMALY OF JAW-CRANIAL BASE RELATIONSHIP: Chronic | ICD-10-CM

## 2024-09-16 PROCEDURE — 77387B: CUSTOM | Mod: 26

## 2024-09-17 ENCOUNTER — NON-APPOINTMENT (OUTPATIENT)
Age: 54
End: 2024-09-17

## 2024-09-17 VITALS
DIASTOLIC BLOOD PRESSURE: 75 MMHG | BODY MASS INDEX: 27.17 KG/M2 | RESPIRATION RATE: 16 BRPM | TEMPERATURE: 97.9 F | HEART RATE: 82 BPM | WEIGHT: 189.38 LBS | OXYGEN SATURATION: 99 % | SYSTOLIC BLOOD PRESSURE: 108 MMHG

## 2024-09-17 DIAGNOSIS — C02.1 MALIGNANT NEOPLASM OF BORDER OF TONGUE: ICD-10-CM

## 2024-09-17 DIAGNOSIS — C77.0 SECONDARY AND UNSPECIFIED MALIGNANT NEOPLASM OF LYMPH NODES OF HEAD, FACE AND NECK: ICD-10-CM

## 2024-09-17 RX ORDER — OXYCODONE 5 MG/1
5 TABLET ORAL
Qty: 28 | Refills: 0 | Status: ACTIVE | COMMUNITY
Start: 2024-09-17 | End: 1900-01-01

## 2024-09-17 RX ORDER — SILVER SULFADIAZINE 10 MG/G
1 CREAM TOPICAL TWICE DAILY
Qty: 1 | Refills: 1 | Status: ACTIVE | COMMUNITY
Start: 2024-09-17 | End: 1900-01-01

## 2024-09-17 NOTE — HISTORY OF PRESENT ILLNESS
[FreeTextEntry1] : Mr.Thomas Dai is 53yr old male here for initial consultation for malignant neoplasm of tongue, referred by ENT, Dr. Mason.  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 He was then sent to ENT, Dr Mason, for consultation in March/2024. Biopsy done in March showed "Fragment of infiltrative squamous cell carcinoma on anterior lateral tongue, P 40 positive, P16 negative". On May 8,2024 he underwent a right partial glossectomy with C02 laser with  and pathology showed "invasive squamous cell carcinoma (SCC), conventional type, measuring 8mm in greatest dimension with depth of invasion 5mm". Since there was 5mm DOI (depth of invasion), he then underwent a right neck dissection on June 21 with . Final path for neck dissection showed metastatic squamous cell carcinoma in right level 2 neck dissection, poorly differentiated present in 2 out of 28 lymph nodes (2/28). 2/46 total. The carcinoma measuring 11 mm in greatest dimension in a single focus, no extranodal extension identified. He has recovered well since surgery. He feels some mild swelling under his chin and right side since surgery and is in the process with meeting with OT. He also has some numbness on tongue and right neck area. He has returned back to work with [employer]. He is also scheduled to retire in 6 weeks. He denies any dysphagia or trouble eating. Weight has been stable. He has past medical h/o HTN and diabetes (Insulin pump: Novolog). He has been cleared from his dentist and follows up on Monday once again to  his fluoride tray that was ordered.  Dentist: Dr Alisa Obando [phone]  Biopsy 3/25/2024: Surgical Pathology Report - Auth (Verified) Specimen(s) Submitted 1  Anterior lateral oral tongue 2  Posterior lateral oral tongue Final Diagnosis 1.  Anterior lateral oral tongue, biopsy: -Fragment of infiltrative squamous cell carcinoma with minimal keratinization.  See note below  2.  Posterior lateral oral tongue, biopsy: -Benign squamous mucosa showing marked lichenoid chronic  lymphocytic inflammation, no  dysplasia or  carcinoma seen.  Addendum Immunohistochemical studies were performed on block 1A at [facility], [address] [place] [address], [id number] (see NOTE).  The results are as follows: The carcinoma stains positive for p40, but negative for p16.  Clinical correlation is recommended.  Pathology 5/8/2024: Final Diagnosis 1.  Right partial glossectomy: -Invasive squamous cell carcinoma(SCC), conventional type, measuring 8 mm in greatest dimension  with depth of invasion 5 mm (on slide 1A); pT1. -All surgical margins of resection, free of carcinoma (  the SCC is at least 8 mm away from  peripheral mucosal margin and 6 mm from deep margin). -No clear-cut histologic evidence of lymphovascular invasion or extratumoral perineural invasion  seen. -The non-neoplastic squamous mucosa without significant pathologic change. -Focal minor salivary gland tissue also present, without significant pathologic change. -See synoptic summary below.  2.  Anterior dorsal margin for frozen section: -Benign squamous mucosa, no carcinoma identified.  3.  Posterior dorsal margin for frozen section: -Benign squamous mucosa, no carcinoma identified.  4.  Ventral tongue margin for frozen section: -Benign squamous mucosa, no carcinoma identified.  5.  Floor of mouth margin for frozen section: -Benign squamous mucosa, no carcinoma identified.  6.  Deep tongue margin for frozen section: -Benign minor salivary gland tissue, no carcinoma seen.  7.  Right posterior partial glossectomy: -Squamous mucosa showing non-keratinized dysplasia, mild to moderate, measuring 4 mm in greatest dimension. -The resection margin appearing free of the dysplasia (at least 3 mm away from peripheral mucosal margin).  Pathology: 6/21/2024: Final Diagnosis 1.  Right level 1 neck dissection: -Benign reactive lymph nodes with sinus histiocytosis (0/3), no carcinoma identified. -Benign salivary gland tissue, no carcinoma identified.  2.  Right level 2 neck dissection: -Metastatic squamous cell carcinoma, poorly differentiated present in 2 out of 28 lymph nodes (2/28). See note below -The carcinoma measuring 11 mm in greatest dimension in a single focus (on slide 2A), no extranodal extension identified.  3.  Right level 3 and 4 neck dissection: -Benign reactive lymph nodes with sinus histiocytosis (0/15), no carcinoma identified.  Note: In context with the patient's prior specimen ([id number]), the pathology staging is pT1, pN2b, pMn/a.

## 2024-09-17 NOTE — REVIEW OF SYSTEMS
[Dysphagia: Grade 1 - Symptomatic, able to eat regular diet] : Dysphagia: Grade 1 - Symptomatic, able to eat regular diet [Nausea: Grade 0] : Nausea: Grade 0 [Fatigue: Grade 1 - Fatigue relieved by rest] : Fatigue: Grade 1 - Fatigue relieved by rest [Mucositis Oral: Grade 1 - Asymptomatic or mild symptoms; intervention not indicated] : Mucositis Oral: Grade 1 - Asymptomatic or mild symptoms; intervention not indicated [Xerostomia: Grade 1 - Symptomatic (e.g., dry or thick saliva) without significant dietary alteration; unstimulated saliva flow >0.2 ml/min] : Xerostomia: Grade 1 - Symptomatic (e.g., dry or thick saliva) without significant dietary alteration; unstimulated saliva flow >0.2 ml/min [Oral Pain: Grade 1 - Mild pain] : Oral Pain: Grade 1 - Mild pain [Dysgeusia: Grade 1- Altered taste but no change in diet] : Dysgeusia: Grade 1 - Altered taste but no change in diet [Dermatitis Radiation: Grade 1 - Faint erythema or dry desquamation] : Dermatitis Radiation: Grade 1 - Faint erythema or dry desquamation

## 2024-09-17 NOTE — PHYSICAL EXAM
[General Appearance - Well Developed] : well developed [General Appearance - Alert] : alert [General Appearance - In No Acute Distress] : in no acute distress [] : no respiratory distress [Oriented To Time, Place, And Person] : oriented to person, place, and time [de-identified] : mucositis

## 2024-09-17 NOTE — DISEASE MANAGEMENT
[Pathological] : TNM Stage: p [N/A] : Currently not applicable [TTNM] : 1 [NTNM] : 2b [MTNM] : x [de-identified] : 5400cGy [de-identified] : 6000cGy [de-identified] : Head and Neck Head and Neck

## 2024-09-17 NOTE — VITALS
[Maximal Pain Intensity: 8/10] : 8/10 [Pain Location: ___] : Pain Location: [unfilled] [OTC] : OTC [NSAID/Non-Opioid] : NSAID/Non-Opioid [90: Able to carry normal activity; minor signs or symptoms of disease.] : 90: Able to carry normal activity; minor signs or symptoms of disease.

## 2024-09-18 ENCOUNTER — OUTPATIENT (OUTPATIENT)
Dept: OUTPATIENT SERVICES | Facility: HOSPITAL | Age: 54
LOS: 1 days | Discharge: HOME | End: 2024-09-18

## 2024-09-18 DIAGNOSIS — C77.0 SECONDARY AND UNSPECIFIED MALIGNANT NEOPLASM OF LYMPH NODES OF HEAD, FACE AND NECK: ICD-10-CM

## 2024-09-18 DIAGNOSIS — Z98.890 OTHER SPECIFIED POSTPROCEDURAL STATES: Chronic | ICD-10-CM

## 2024-09-18 DIAGNOSIS — Z96.641 PRESENCE OF RIGHT ARTIFICIAL HIP JOINT: Chronic | ICD-10-CM

## 2024-09-18 DIAGNOSIS — C02.1 MALIGNANT NEOPLASM OF BORDER OF TONGUE: ICD-10-CM

## 2024-09-18 DIAGNOSIS — Z90.49 ACQUIRED ABSENCE OF OTHER SPECIFIED PARTS OF DIGESTIVE TRACT: Chronic | ICD-10-CM

## 2024-09-18 PROCEDURE — 77387B: CUSTOM | Mod: 26

## 2024-09-19 ENCOUNTER — OUTPATIENT (OUTPATIENT)
Dept: OUTPATIENT SERVICES | Facility: HOSPITAL | Age: 54
LOS: 1 days | Discharge: HOME | End: 2024-09-19

## 2024-09-19 DIAGNOSIS — S99.911A UNSPECIFIED INJURY OF RIGHT ANKLE, INITIAL ENCOUNTER: Chronic | ICD-10-CM

## 2024-09-19 DIAGNOSIS — S49.90XA UNSPECIFIED INJURY OF SHOULDER AND UPPER ARM, UNSPECIFIED ARM, INITIAL ENCOUNTER: Chronic | ICD-10-CM

## 2024-09-19 DIAGNOSIS — M26.10 UNSPECIFIED ANOMALY OF JAW-CRANIAL BASE RELATIONSHIP: Chronic | ICD-10-CM

## 2024-09-19 DIAGNOSIS — Z96.641 PRESENCE OF RIGHT ARTIFICIAL HIP JOINT: Chronic | ICD-10-CM

## 2024-09-19 DIAGNOSIS — Z98.890 OTHER SPECIFIED POSTPROCEDURAL STATES: Chronic | ICD-10-CM

## 2024-09-19 DIAGNOSIS — J34.2 DEVIATED NASAL SEPTUM: Chronic | ICD-10-CM

## 2024-09-19 PROCEDURE — 77387B: CUSTOM | Mod: 26

## 2024-09-20 ENCOUNTER — OUTPATIENT (OUTPATIENT)
Dept: OUTPATIENT SERVICES | Facility: HOSPITAL | Age: 54
LOS: 1 days | Discharge: HOME | End: 2024-09-20

## 2024-09-20 DIAGNOSIS — Z90.49 ACQUIRED ABSENCE OF OTHER SPECIFIED PARTS OF DIGESTIVE TRACT: Chronic | ICD-10-CM

## 2024-09-20 DIAGNOSIS — Z98.890 OTHER SPECIFIED POSTPROCEDURAL STATES: Chronic | ICD-10-CM

## 2024-09-20 DIAGNOSIS — M26.10 UNSPECIFIED ANOMALY OF JAW-CRANIAL BASE RELATIONSHIP: Chronic | ICD-10-CM

## 2024-09-20 DIAGNOSIS — C02.1 MALIGNANT NEOPLASM OF BORDER OF TONGUE: ICD-10-CM

## 2024-09-20 DIAGNOSIS — C77.0 SECONDARY AND UNSPECIFIED MALIGNANT NEOPLASM OF LYMPH NODES OF HEAD, FACE AND NECK: ICD-10-CM

## 2024-09-20 DIAGNOSIS — S49.90XA UNSPECIFIED INJURY OF SHOULDER AND UPPER ARM, UNSPECIFIED ARM, INITIAL ENCOUNTER: Chronic | ICD-10-CM

## 2024-09-20 DIAGNOSIS — S99.911A UNSPECIFIED INJURY OF RIGHT ANKLE, INITIAL ENCOUNTER: Chronic | ICD-10-CM

## 2024-09-23 DIAGNOSIS — C02.1 MALIGNANT NEOPLASM OF BORDER OF TONGUE: ICD-10-CM

## 2024-10-03 ENCOUNTER — APPOINTMENT (OUTPATIENT)
Dept: OTOLARYNGOLOGY | Facility: CLINIC | Age: 54
End: 2024-10-03
Payer: COMMERCIAL

## 2024-10-03 VITALS — BODY MASS INDEX: 24.77 KG/M2 | HEIGHT: 70 IN | WEIGHT: 173 LBS

## 2024-10-03 DIAGNOSIS — C02.1 MALIGNANT NEOPLASM OF BORDER OF TONGUE: ICD-10-CM

## 2024-10-03 DIAGNOSIS — C77.0 SECONDARY AND UNSPECIFIED MALIGNANT NEOPLASM OF LYMPH NODES OF HEAD, FACE AND NECK: ICD-10-CM

## 2024-10-03 PROCEDURE — G2211 COMPLEX E/M VISIT ADD ON: CPT | Mod: NC

## 2024-10-03 PROCEDURE — 99213 OFFICE O/P EST LOW 20 MIN: CPT

## 2024-10-03 NOTE — HISTORY OF PRESENT ILLNESS
[de-identified] : Oncology Summary Stage: [id number] Site: Right oral tongue Pathology: Squamous cell carcinoma Treatment: Right partial glossectomy 5/2024 (8mm primary with 5mm DOI, negative margin, no PNI/LVI), right 1-4 neck dissection 6/2024 (2/46 LN, largest 11mm, no RUSLAN) Disease status: Post-operative Swallow: Normal Airway: Normal Smoking: Never smoker Thyroid: [FreeTextEntry1] : Patient is following up today s/p modified neck dissection. States that he is experiencing pain in his mouth and tongue. Used Gabapentin and magic mouthwash with no improvement. Prescribed Oxycodone but patient tries not to take it. Having trouble swallowing and eating.

## 2024-10-03 NOTE — ASSESSMENT
[FreeTextEntry1] : Expected acute effects of adjuvant RT Discussed hydration and swallow techniques RV 4-6 weeks

## 2024-10-03 NOTE — PHYSICAL EXAM
[Normal] : mucosa is normal [Midline] : trachea located in midline position [de-identified] : mucositis

## 2024-10-30 ENCOUNTER — APPOINTMENT (OUTPATIENT)
Dept: RADIATION ONCOLOGY | Facility: HOSPITAL | Age: 54
End: 2024-10-30
Payer: COMMERCIAL

## 2024-10-30 ENCOUNTER — OUTPATIENT (OUTPATIENT)
Dept: OUTPATIENT SERVICES | Facility: HOSPITAL | Age: 54
LOS: 1 days | End: 2024-10-30
Payer: COMMERCIAL

## 2024-10-30 VITALS
WEIGHT: 170.25 LBS | DIASTOLIC BLOOD PRESSURE: 81 MMHG | SYSTOLIC BLOOD PRESSURE: 122 MMHG | HEART RATE: 100 BPM | OXYGEN SATURATION: 96 % | RESPIRATION RATE: 16 BRPM | TEMPERATURE: 97.9 F | BODY MASS INDEX: 24.43 KG/M2

## 2024-10-30 DIAGNOSIS — Z98.890 OTHER SPECIFIED POSTPROCEDURAL STATES: Chronic | ICD-10-CM

## 2024-10-30 DIAGNOSIS — Z92.3 PERSONAL HISTORY OF IRRADIATION: ICD-10-CM

## 2024-10-30 DIAGNOSIS — C77.0 SECONDARY AND UNSPECIFIED MALIGNANT NEOPLASM OF LYMPH NODES OF HEAD, FACE AND NECK: ICD-10-CM

## 2024-10-30 DIAGNOSIS — S49.90XA UNSPECIFIED INJURY OF SHOULDER AND UPPER ARM, UNSPECIFIED ARM, INITIAL ENCOUNTER: Chronic | ICD-10-CM

## 2024-10-30 DIAGNOSIS — M26.10 UNSPECIFIED ANOMALY OF JAW-CRANIAL BASE RELATIONSHIP: Chronic | ICD-10-CM

## 2024-10-30 DIAGNOSIS — J34.2 DEVIATED NASAL SEPTUM: Chronic | ICD-10-CM

## 2024-10-30 DIAGNOSIS — C02.1 MALIGNANT NEOPLASM OF BORDER OF TONGUE: ICD-10-CM

## 2024-10-30 DIAGNOSIS — Z90.49 ACQUIRED ABSENCE OF OTHER SPECIFIED PARTS OF DIGESTIVE TRACT: Chronic | ICD-10-CM

## 2024-10-30 DIAGNOSIS — S99.911A UNSPECIFIED INJURY OF RIGHT ANKLE, INITIAL ENCOUNTER: Chronic | ICD-10-CM

## 2024-10-30 DIAGNOSIS — Z96.641 PRESENCE OF RIGHT ARTIFICIAL HIP JOINT: Chronic | ICD-10-CM

## 2024-10-30 PROCEDURE — 99024 POSTOP FOLLOW-UP VISIT: CPT

## 2024-10-30 RX ORDER — GABAPENTIN 300 MG/1
300 CAPSULE ORAL 3 TIMES DAILY
Qty: 90 | Refills: 1 | Status: ACTIVE | COMMUNITY
Start: 2024-10-30 | End: 1900-01-01

## 2024-10-30 RX ORDER — OXYCODONE 5 MG/1
5 TABLET ORAL
Qty: 28 | Refills: 0 | Status: ACTIVE | COMMUNITY
Start: 2024-10-30 | End: 1900-01-01

## 2024-11-11 ENCOUNTER — APPOINTMENT (OUTPATIENT)
Dept: OTOLARYNGOLOGY | Facility: CLINIC | Age: 54
End: 2024-11-11
Payer: COMMERCIAL

## 2024-11-11 VITALS — HEIGHT: 70 IN | BODY MASS INDEX: 24.62 KG/M2 | WEIGHT: 172 LBS

## 2024-11-11 DIAGNOSIS — L43.8 OTHER LICHEN PLANUS: ICD-10-CM

## 2024-11-11 DIAGNOSIS — R13.10 DYSPHAGIA, UNSPECIFIED: ICD-10-CM

## 2024-11-11 DIAGNOSIS — C02.1 MALIGNANT NEOPLASM OF BORDER OF TONGUE: ICD-10-CM

## 2024-11-11 DIAGNOSIS — C77.0 SECONDARY AND UNSPECIFIED MALIGNANT NEOPLASM OF LYMPH NODES OF HEAD, FACE AND NECK: ICD-10-CM

## 2024-11-11 PROCEDURE — 99214 OFFICE O/P EST MOD 30 MIN: CPT | Mod: 25

## 2024-11-11 PROCEDURE — 31575 DIAGNOSTIC LARYNGOSCOPY: CPT

## 2024-11-12 NOTE — H&P PST ADULT - DOES THIS PATIENT HAVE A HISTORY OF OR HAS BEEN DX WITH HEART FAILURE?
- Presented with an unwitnessed fall   - Hemodynamically stable and afebrile  - can be likely due to Afib, ?Eliquis failure  - Code Stroke in ED  - CT Head showing Acute evolving right caudate head/basal ganglia/corona radiata infarction. No evidence of hemorrhagic transformation.  - CTA Head and Neck showing Moderate stenosis of the right M1 segment   - MRI: Acute right MCA territory infarctions  - EKG A. Fib  - Admit to tele  - LDL: 92, A1c: 6.5%  - b/l LE US dopplers: neg  - passsed bedside dysphagia  - TTE: EF: 55-60%, neg intracardiac shunt    - c/w Asprin 81mg qd and Atorvastatin 80mg qd  - f/u EEG  - Dr. Leach neuro following  - PT Consulted - Presented with an unwitnessed fall   - Hemodynamically stable and afebrile  - due to ?Eliquis failure  - Code Stroke in ED  - CT Head showing Acute evolving right caudate head/basal ganglia/corona radiata infarction. No evidence of hemorrhagic transformation.  - CTA Head and Neck showing Moderate stenosis of the right M1 segment   - MRI: Acute right MCA territory infarctions  - EKG A. Fib  - Admit to tele  - LDL: 92, A1c: 6.5%  - b/l LE US dopplers: neg  - passsed bedside dysphagia  - TTE: EF: 55-60%, neg intracardiac shunt    - c/w Asprin 81mg qd and Atorvastatin 80mg qd  - f/u EEG  - Dr. Leach neuro following  - PT Consulted - rec: MEG unknown

## 2024-12-23 ENCOUNTER — OUTPATIENT (OUTPATIENT)
Dept: OUTPATIENT SERVICES | Facility: HOSPITAL | Age: 54
LOS: 1 days | End: 2024-12-23
Payer: COMMERCIAL

## 2024-12-23 DIAGNOSIS — S49.90XA UNSPECIFIED INJURY OF SHOULDER AND UPPER ARM, UNSPECIFIED ARM, INITIAL ENCOUNTER: Chronic | ICD-10-CM

## 2024-12-23 DIAGNOSIS — J34.2 DEVIATED NASAL SEPTUM: Chronic | ICD-10-CM

## 2024-12-23 DIAGNOSIS — S99.911A UNSPECIFIED INJURY OF RIGHT ANKLE, INITIAL ENCOUNTER: Chronic | ICD-10-CM

## 2024-12-23 DIAGNOSIS — Z98.890 OTHER SPECIFIED POSTPROCEDURAL STATES: Chronic | ICD-10-CM

## 2024-12-23 DIAGNOSIS — Z96.641 PRESENCE OF RIGHT ARTIFICIAL HIP JOINT: Chronic | ICD-10-CM

## 2024-12-23 DIAGNOSIS — Z00.8 ENCOUNTER FOR OTHER GENERAL EXAMINATION: ICD-10-CM

## 2024-12-23 DIAGNOSIS — Z90.49 ACQUIRED ABSENCE OF OTHER SPECIFIED PARTS OF DIGESTIVE TRACT: Chronic | ICD-10-CM

## 2024-12-23 DIAGNOSIS — M26.10 UNSPECIFIED ANOMALY OF JAW-CRANIAL BASE RELATIONSHIP: Chronic | ICD-10-CM

## 2024-12-23 DIAGNOSIS — C02.1 MALIGNANT NEOPLASM OF BORDER OF TONGUE: ICD-10-CM

## 2024-12-23 LAB — GLUCOSE BLDC GLUCOMTR-MCNC: 154 MG/DL — HIGH (ref 70–99)

## 2024-12-23 PROCEDURE — 82962 GLUCOSE BLOOD TEST: CPT

## 2024-12-23 PROCEDURE — 78815 PET IMAGE W/CT SKULL-THIGH: CPT | Mod: 26,PS

## 2024-12-23 PROCEDURE — A9552: CPT

## 2024-12-23 PROCEDURE — 78815 PET IMAGE W/CT SKULL-THIGH: CPT | Mod: PS

## 2024-12-24 DIAGNOSIS — C02.1 MALIGNANT NEOPLASM OF BORDER OF TONGUE: ICD-10-CM

## 2025-01-06 ENCOUNTER — APPOINTMENT (OUTPATIENT)
Dept: OTOLARYNGOLOGY | Facility: CLINIC | Age: 55
End: 2025-01-06
Payer: COMMERCIAL

## 2025-01-06 VITALS — HEIGHT: 70 IN | BODY MASS INDEX: 24.05 KG/M2 | WEIGHT: 168 LBS

## 2025-01-06 DIAGNOSIS — C77.0 SECONDARY AND UNSPECIFIED MALIGNANT NEOPLASM OF LYMPH NODES OF HEAD, FACE AND NECK: ICD-10-CM

## 2025-01-06 DIAGNOSIS — C02.1 MALIGNANT NEOPLASM OF BORDER OF TONGUE: ICD-10-CM

## 2025-01-06 DIAGNOSIS — R13.10 DYSPHAGIA, UNSPECIFIED: ICD-10-CM

## 2025-01-06 DIAGNOSIS — I89.0 LYMPHEDEMA, NOT ELSEWHERE CLASSIFIED: ICD-10-CM

## 2025-01-06 PROCEDURE — 31575 DIAGNOSTIC LARYNGOSCOPY: CPT

## 2025-01-06 PROCEDURE — 99214 OFFICE O/P EST MOD 30 MIN: CPT | Mod: 25

## 2025-01-29 ENCOUNTER — OUTPATIENT (OUTPATIENT)
Dept: OUTPATIENT SERVICES | Facility: HOSPITAL | Age: 55
LOS: 1 days | End: 2025-01-29
Payer: COMMERCIAL

## 2025-01-29 ENCOUNTER — APPOINTMENT (OUTPATIENT)
Dept: RADIATION ONCOLOGY | Facility: HOSPITAL | Age: 55
End: 2025-01-29
Payer: COMMERCIAL

## 2025-01-29 VITALS
BODY MASS INDEX: 24.55 KG/M2 | DIASTOLIC BLOOD PRESSURE: 76 MMHG | SYSTOLIC BLOOD PRESSURE: 108 MMHG | HEART RATE: 85 BPM | RESPIRATION RATE: 16 BRPM | WEIGHT: 171.13 LBS | OXYGEN SATURATION: 98 % | TEMPERATURE: 97.9 F

## 2025-01-29 DIAGNOSIS — C02.1 MALIGNANT NEOPLASM OF BORDER OF TONGUE: ICD-10-CM

## 2025-01-29 DIAGNOSIS — Z90.49 ACQUIRED ABSENCE OF OTHER SPECIFIED PARTS OF DIGESTIVE TRACT: Chronic | ICD-10-CM

## 2025-01-29 DIAGNOSIS — Z98.890 OTHER SPECIFIED POSTPROCEDURAL STATES: Chronic | ICD-10-CM

## 2025-01-29 DIAGNOSIS — S49.90XA UNSPECIFIED INJURY OF SHOULDER AND UPPER ARM, UNSPECIFIED ARM, INITIAL ENCOUNTER: Chronic | ICD-10-CM

## 2025-01-29 DIAGNOSIS — C77.0 SECONDARY AND UNSPECIFIED MALIGNANT NEOPLASM OF LYMPH NODES OF HEAD, FACE AND NECK: ICD-10-CM

## 2025-01-29 DIAGNOSIS — Z96.641 PRESENCE OF RIGHT ARTIFICIAL HIP JOINT: Chronic | ICD-10-CM

## 2025-01-29 DIAGNOSIS — S99.911A UNSPECIFIED INJURY OF RIGHT ANKLE, INITIAL ENCOUNTER: Chronic | ICD-10-CM

## 2025-01-29 DIAGNOSIS — M26.10 UNSPECIFIED ANOMALY OF JAW-CRANIAL BASE RELATIONSHIP: Chronic | ICD-10-CM

## 2025-01-29 DIAGNOSIS — J34.2 DEVIATED NASAL SEPTUM: Chronic | ICD-10-CM

## 2025-01-29 PROCEDURE — 99212 OFFICE O/P EST SF 10 MIN: CPT

## 2025-01-29 PROCEDURE — G2211 COMPLEX E/M VISIT ADD ON: CPT

## 2025-01-29 RX ORDER — MULTIVIT-MINERALS/FA/LYCOPENE 400-370MCG
TABLET ORAL
Refills: 0 | Status: ACTIVE | COMMUNITY

## 2025-01-31 DIAGNOSIS — C02.1 MALIGNANT NEOPLASM OF BORDER OF TONGUE: ICD-10-CM

## 2025-02-04 ENCOUNTER — OUTPATIENT (OUTPATIENT)
Dept: OUTPATIENT SERVICES | Facility: HOSPITAL | Age: 55
LOS: 1 days | End: 2025-02-04
Payer: COMMERCIAL

## 2025-02-04 DIAGNOSIS — C02.1 MALIGNANT NEOPLASM OF BORDER OF TONGUE: ICD-10-CM

## 2025-02-04 DIAGNOSIS — J34.2 DEVIATED NASAL SEPTUM: Chronic | ICD-10-CM

## 2025-02-04 DIAGNOSIS — Z96.641 PRESENCE OF RIGHT ARTIFICIAL HIP JOINT: Chronic | ICD-10-CM

## 2025-02-04 DIAGNOSIS — Z98.890 OTHER SPECIFIED POSTPROCEDURAL STATES: Chronic | ICD-10-CM

## 2025-02-04 DIAGNOSIS — Z90.49 ACQUIRED ABSENCE OF OTHER SPECIFIED PARTS OF DIGESTIVE TRACT: Chronic | ICD-10-CM

## 2025-02-04 DIAGNOSIS — S49.90XA UNSPECIFIED INJURY OF SHOULDER AND UPPER ARM, UNSPECIFIED ARM, INITIAL ENCOUNTER: Chronic | ICD-10-CM

## 2025-02-04 DIAGNOSIS — S99.911A UNSPECIFIED INJURY OF RIGHT ANKLE, INITIAL ENCOUNTER: Chronic | ICD-10-CM

## 2025-02-04 DIAGNOSIS — M26.10 UNSPECIFIED ANOMALY OF JAW-CRANIAL BASE RELATIONSHIP: Chronic | ICD-10-CM

## 2025-02-04 PROCEDURE — 97165 OT EVAL LOW COMPLEX 30 MIN: CPT | Mod: GO

## 2025-02-04 PROCEDURE — 97140 MANUAL THERAPY 1/> REGIONS: CPT | Mod: GO

## 2025-02-05 DIAGNOSIS — C02.1 MALIGNANT NEOPLASM OF BORDER OF TONGUE: ICD-10-CM

## 2025-02-05 DIAGNOSIS — C77.0 SECONDARY AND UNSPECIFIED MALIGNANT NEOPLASM OF LYMPH NODES OF HEAD, FACE AND NECK: ICD-10-CM

## 2025-02-10 ENCOUNTER — OUTPATIENT (OUTPATIENT)
Dept: OUTPATIENT SERVICES | Facility: HOSPITAL | Age: 55
LOS: 1 days | End: 2025-02-10

## 2025-02-10 DIAGNOSIS — Z98.890 OTHER SPECIFIED POSTPROCEDURAL STATES: Chronic | ICD-10-CM

## 2025-02-10 DIAGNOSIS — C02.1 MALIGNANT NEOPLASM OF BORDER OF TONGUE: ICD-10-CM

## 2025-02-10 DIAGNOSIS — S49.90XA UNSPECIFIED INJURY OF SHOULDER AND UPPER ARM, UNSPECIFIED ARM, INITIAL ENCOUNTER: Chronic | ICD-10-CM

## 2025-02-10 DIAGNOSIS — J34.2 DEVIATED NASAL SEPTUM: Chronic | ICD-10-CM

## 2025-02-10 DIAGNOSIS — Z90.49 ACQUIRED ABSENCE OF OTHER SPECIFIED PARTS OF DIGESTIVE TRACT: Chronic | ICD-10-CM

## 2025-02-10 DIAGNOSIS — C77.0 SECONDARY AND UNSPECIFIED MALIGNANT NEOPLASM OF LYMPH NODES OF HEAD, FACE AND NECK: ICD-10-CM

## 2025-02-10 DIAGNOSIS — M26.10 UNSPECIFIED ANOMALY OF JAW-CRANIAL BASE RELATIONSHIP: Chronic | ICD-10-CM

## 2025-02-10 DIAGNOSIS — S99.911A UNSPECIFIED INJURY OF RIGHT ANKLE, INITIAL ENCOUNTER: Chronic | ICD-10-CM

## 2025-02-10 DIAGNOSIS — Z96.641 PRESENCE OF RIGHT ARTIFICIAL HIP JOINT: Chronic | ICD-10-CM

## 2025-02-10 DIAGNOSIS — Z92.3 PERSONAL HISTORY OF IRRADIATION: ICD-10-CM

## 2025-02-11 DIAGNOSIS — Z92.3 PERSONAL HISTORY OF IRRADIATION: ICD-10-CM

## 2025-02-12 ENCOUNTER — OUTPATIENT (OUTPATIENT)
Dept: OUTPATIENT SERVICES | Facility: HOSPITAL | Age: 55
LOS: 1 days | End: 2025-02-12

## 2025-02-12 DIAGNOSIS — C77.0 SECONDARY AND UNSPECIFIED MALIGNANT NEOPLASM OF LYMPH NODES OF HEAD, FACE AND NECK: ICD-10-CM

## 2025-02-12 DIAGNOSIS — Z98.890 OTHER SPECIFIED POSTPROCEDURAL STATES: Chronic | ICD-10-CM

## 2025-02-12 DIAGNOSIS — Z90.49 ACQUIRED ABSENCE OF OTHER SPECIFIED PARTS OF DIGESTIVE TRACT: Chronic | ICD-10-CM

## 2025-02-12 DIAGNOSIS — J34.2 DEVIATED NASAL SEPTUM: Chronic | ICD-10-CM

## 2025-02-12 DIAGNOSIS — C02.1 MALIGNANT NEOPLASM OF BORDER OF TONGUE: ICD-10-CM

## 2025-02-12 DIAGNOSIS — Z96.641 PRESENCE OF RIGHT ARTIFICIAL HIP JOINT: Chronic | ICD-10-CM

## 2025-02-12 DIAGNOSIS — M26.10 UNSPECIFIED ANOMALY OF JAW-CRANIAL BASE RELATIONSHIP: Chronic | ICD-10-CM

## 2025-02-12 DIAGNOSIS — S99.911A UNSPECIFIED INJURY OF RIGHT ANKLE, INITIAL ENCOUNTER: Chronic | ICD-10-CM

## 2025-02-12 DIAGNOSIS — S49.90XA UNSPECIFIED INJURY OF SHOULDER AND UPPER ARM, UNSPECIFIED ARM, INITIAL ENCOUNTER: Chronic | ICD-10-CM

## 2025-02-26 ENCOUNTER — APPOINTMENT (OUTPATIENT)
Dept: ORTHOPEDIC SURGERY | Facility: CLINIC | Age: 55
End: 2025-02-26
Payer: COMMERCIAL

## 2025-02-26 DIAGNOSIS — M65.331 TRIGGER FINGER, RIGHT MIDDLE FINGER: ICD-10-CM

## 2025-02-26 PROCEDURE — 99214 OFFICE O/P EST MOD 30 MIN: CPT | Mod: 25

## 2025-02-26 PROCEDURE — 20550 NJX 1 TENDON SHEATH/LIGAMENT: CPT | Mod: RT

## 2025-03-10 ENCOUNTER — OUTPATIENT (OUTPATIENT)
Dept: OUTPATIENT SERVICES | Facility: HOSPITAL | Age: 55
LOS: 1 days | End: 2025-03-10
Payer: COMMERCIAL

## 2025-03-10 DIAGNOSIS — C77.0 SECONDARY AND UNSPECIFIED MALIGNANT NEOPLASM OF LYMPH NODES OF HEAD, FACE AND NECK: ICD-10-CM

## 2025-03-10 DIAGNOSIS — Z92.3 PERSONAL HISTORY OF IRRADIATION: ICD-10-CM

## 2025-03-10 DIAGNOSIS — Z98.890 OTHER SPECIFIED POSTPROCEDURAL STATES: Chronic | ICD-10-CM

## 2025-03-10 DIAGNOSIS — Z90.49 ACQUIRED ABSENCE OF OTHER SPECIFIED PARTS OF DIGESTIVE TRACT: Chronic | ICD-10-CM

## 2025-03-10 DIAGNOSIS — J34.2 DEVIATED NASAL SEPTUM: Chronic | ICD-10-CM

## 2025-03-10 DIAGNOSIS — Z96.641 PRESENCE OF RIGHT ARTIFICIAL HIP JOINT: Chronic | ICD-10-CM

## 2025-03-10 DIAGNOSIS — S49.90XA UNSPECIFIED INJURY OF SHOULDER AND UPPER ARM, UNSPECIFIED ARM, INITIAL ENCOUNTER: Chronic | ICD-10-CM

## 2025-03-10 DIAGNOSIS — S99.911A UNSPECIFIED INJURY OF RIGHT ANKLE, INITIAL ENCOUNTER: Chronic | ICD-10-CM

## 2025-03-10 DIAGNOSIS — M26.10 UNSPECIFIED ANOMALY OF JAW-CRANIAL BASE RELATIONSHIP: Chronic | ICD-10-CM

## 2025-03-10 PROCEDURE — 97110 THERAPEUTIC EXERCISES: CPT | Mod: GO

## 2025-03-10 PROCEDURE — 97140 MANUAL THERAPY 1/> REGIONS: CPT | Mod: GO

## 2025-03-11 DIAGNOSIS — Z92.3 PERSONAL HISTORY OF IRRADIATION: ICD-10-CM

## 2025-03-12 ENCOUNTER — OUTPATIENT (OUTPATIENT)
Dept: OUTPATIENT SERVICES | Facility: HOSPITAL | Age: 55
LOS: 1 days | End: 2025-03-12

## 2025-03-12 DIAGNOSIS — C77.0 SECONDARY AND UNSPECIFIED MALIGNANT NEOPLASM OF LYMPH NODES OF HEAD, FACE AND NECK: ICD-10-CM

## 2025-03-12 DIAGNOSIS — S99.911A UNSPECIFIED INJURY OF RIGHT ANKLE, INITIAL ENCOUNTER: Chronic | ICD-10-CM

## 2025-03-12 DIAGNOSIS — J34.2 DEVIATED NASAL SEPTUM: Chronic | ICD-10-CM

## 2025-03-12 DIAGNOSIS — M26.10 UNSPECIFIED ANOMALY OF JAW-CRANIAL BASE RELATIONSHIP: Chronic | ICD-10-CM

## 2025-03-12 DIAGNOSIS — Z90.49 ACQUIRED ABSENCE OF OTHER SPECIFIED PARTS OF DIGESTIVE TRACT: Chronic | ICD-10-CM

## 2025-03-12 DIAGNOSIS — Z98.890 OTHER SPECIFIED POSTPROCEDURAL STATES: Chronic | ICD-10-CM

## 2025-03-12 DIAGNOSIS — Z92.3 PERSONAL HISTORY OF IRRADIATION: ICD-10-CM

## 2025-03-12 DIAGNOSIS — S49.90XA UNSPECIFIED INJURY OF SHOULDER AND UPPER ARM, UNSPECIFIED ARM, INITIAL ENCOUNTER: Chronic | ICD-10-CM

## 2025-03-12 DIAGNOSIS — Z96.641 PRESENCE OF RIGHT ARTIFICIAL HIP JOINT: Chronic | ICD-10-CM

## 2025-03-17 ENCOUNTER — OUTPATIENT (OUTPATIENT)
Dept: OUTPATIENT SERVICES | Facility: HOSPITAL | Age: 55
LOS: 1 days | End: 2025-03-17

## 2025-03-17 DIAGNOSIS — J34.2 DEVIATED NASAL SEPTUM: Chronic | ICD-10-CM

## 2025-03-17 DIAGNOSIS — Z92.3 PERSONAL HISTORY OF IRRADIATION: ICD-10-CM

## 2025-03-17 DIAGNOSIS — Z98.890 OTHER SPECIFIED POSTPROCEDURAL STATES: Chronic | ICD-10-CM

## 2025-03-17 DIAGNOSIS — Z90.49 ACQUIRED ABSENCE OF OTHER SPECIFIED PARTS OF DIGESTIVE TRACT: Chronic | ICD-10-CM

## 2025-03-17 DIAGNOSIS — C77.0 SECONDARY AND UNSPECIFIED MALIGNANT NEOPLASM OF LYMPH NODES OF HEAD, FACE AND NECK: ICD-10-CM

## 2025-03-17 DIAGNOSIS — Z96.641 PRESENCE OF RIGHT ARTIFICIAL HIP JOINT: Chronic | ICD-10-CM

## 2025-03-17 DIAGNOSIS — M26.10 UNSPECIFIED ANOMALY OF JAW-CRANIAL BASE RELATIONSHIP: Chronic | ICD-10-CM

## 2025-03-17 DIAGNOSIS — S49.90XA UNSPECIFIED INJURY OF SHOULDER AND UPPER ARM, UNSPECIFIED ARM, INITIAL ENCOUNTER: Chronic | ICD-10-CM

## 2025-03-17 DIAGNOSIS — S99.911A UNSPECIFIED INJURY OF RIGHT ANKLE, INITIAL ENCOUNTER: Chronic | ICD-10-CM

## 2025-03-18 ENCOUNTER — APPOINTMENT (OUTPATIENT)
Dept: ORTHOPEDIC SURGERY | Facility: CLINIC | Age: 55
End: 2025-03-18
Payer: COMMERCIAL

## 2025-03-18 DIAGNOSIS — Z96.641 PRESENCE OF RIGHT ARTIFICIAL HIP JOINT: ICD-10-CM

## 2025-03-18 DIAGNOSIS — M16.12 UNILATERAL PRIMARY OSTEOARTHRITIS, LEFT HIP: ICD-10-CM

## 2025-03-18 PROCEDURE — 73502 X-RAY EXAM HIP UNI 2-3 VIEWS: CPT

## 2025-03-18 PROCEDURE — 99205 OFFICE O/P NEW HI 60 MIN: CPT

## 2025-03-19 ENCOUNTER — OUTPATIENT (OUTPATIENT)
Dept: OUTPATIENT SERVICES | Facility: HOSPITAL | Age: 55
LOS: 1 days | End: 2025-03-19

## 2025-03-19 DIAGNOSIS — S99.911A UNSPECIFIED INJURY OF RIGHT ANKLE, INITIAL ENCOUNTER: Chronic | ICD-10-CM

## 2025-03-19 DIAGNOSIS — Z98.890 OTHER SPECIFIED POSTPROCEDURAL STATES: Chronic | ICD-10-CM

## 2025-03-19 DIAGNOSIS — Z92.3 PERSONAL HISTORY OF IRRADIATION: ICD-10-CM

## 2025-03-19 DIAGNOSIS — J34.2 DEVIATED NASAL SEPTUM: Chronic | ICD-10-CM

## 2025-03-19 DIAGNOSIS — C77.0 SECONDARY AND UNSPECIFIED MALIGNANT NEOPLASM OF LYMPH NODES OF HEAD, FACE AND NECK: ICD-10-CM

## 2025-03-19 DIAGNOSIS — S49.90XA UNSPECIFIED INJURY OF SHOULDER AND UPPER ARM, UNSPECIFIED ARM, INITIAL ENCOUNTER: Chronic | ICD-10-CM

## 2025-03-19 DIAGNOSIS — Z96.641 PRESENCE OF RIGHT ARTIFICIAL HIP JOINT: Chronic | ICD-10-CM

## 2025-03-19 DIAGNOSIS — Z90.49 ACQUIRED ABSENCE OF OTHER SPECIFIED PARTS OF DIGESTIVE TRACT: Chronic | ICD-10-CM

## 2025-03-19 DIAGNOSIS — M26.10 UNSPECIFIED ANOMALY OF JAW-CRANIAL BASE RELATIONSHIP: Chronic | ICD-10-CM

## 2025-03-26 ENCOUNTER — OUTPATIENT (OUTPATIENT)
Dept: OUTPATIENT SERVICES | Facility: HOSPITAL | Age: 55
LOS: 1 days | End: 2025-03-26

## 2025-03-26 DIAGNOSIS — J34.2 DEVIATED NASAL SEPTUM: Chronic | ICD-10-CM

## 2025-03-26 DIAGNOSIS — Z96.641 PRESENCE OF RIGHT ARTIFICIAL HIP JOINT: Chronic | ICD-10-CM

## 2025-03-26 DIAGNOSIS — Z98.890 OTHER SPECIFIED POSTPROCEDURAL STATES: Chronic | ICD-10-CM

## 2025-03-26 DIAGNOSIS — S99.911A UNSPECIFIED INJURY OF RIGHT ANKLE, INITIAL ENCOUNTER: Chronic | ICD-10-CM

## 2025-03-26 DIAGNOSIS — M26.10 UNSPECIFIED ANOMALY OF JAW-CRANIAL BASE RELATIONSHIP: Chronic | ICD-10-CM

## 2025-03-26 DIAGNOSIS — Z92.3 PERSONAL HISTORY OF IRRADIATION: ICD-10-CM

## 2025-03-26 DIAGNOSIS — C77.0 SECONDARY AND UNSPECIFIED MALIGNANT NEOPLASM OF LYMPH NODES OF HEAD, FACE AND NECK: ICD-10-CM

## 2025-03-26 DIAGNOSIS — S49.90XA UNSPECIFIED INJURY OF SHOULDER AND UPPER ARM, UNSPECIFIED ARM, INITIAL ENCOUNTER: Chronic | ICD-10-CM

## 2025-03-26 DIAGNOSIS — Z90.49 ACQUIRED ABSENCE OF OTHER SPECIFIED PARTS OF DIGESTIVE TRACT: Chronic | ICD-10-CM

## 2025-03-28 ENCOUNTER — OUTPATIENT (OUTPATIENT)
Dept: OUTPATIENT SERVICES | Facility: HOSPITAL | Age: 55
LOS: 1 days | End: 2025-03-28

## 2025-03-28 DIAGNOSIS — S49.90XA UNSPECIFIED INJURY OF SHOULDER AND UPPER ARM, UNSPECIFIED ARM, INITIAL ENCOUNTER: Chronic | ICD-10-CM

## 2025-03-28 DIAGNOSIS — S99.911A UNSPECIFIED INJURY OF RIGHT ANKLE, INITIAL ENCOUNTER: Chronic | ICD-10-CM

## 2025-03-28 DIAGNOSIS — C77.0 SECONDARY AND UNSPECIFIED MALIGNANT NEOPLASM OF LYMPH NODES OF HEAD, FACE AND NECK: ICD-10-CM

## 2025-03-28 DIAGNOSIS — Z98.890 OTHER SPECIFIED POSTPROCEDURAL STATES: Chronic | ICD-10-CM

## 2025-03-28 DIAGNOSIS — Z96.641 PRESENCE OF RIGHT ARTIFICIAL HIP JOINT: Chronic | ICD-10-CM

## 2025-03-28 DIAGNOSIS — Z90.49 ACQUIRED ABSENCE OF OTHER SPECIFIED PARTS OF DIGESTIVE TRACT: Chronic | ICD-10-CM

## 2025-03-28 DIAGNOSIS — Z92.3 PERSONAL HISTORY OF IRRADIATION: ICD-10-CM

## 2025-03-28 DIAGNOSIS — J34.2 DEVIATED NASAL SEPTUM: Chronic | ICD-10-CM

## 2025-03-28 DIAGNOSIS — M26.10 UNSPECIFIED ANOMALY OF JAW-CRANIAL BASE RELATIONSHIP: Chronic | ICD-10-CM

## 2025-04-01 ENCOUNTER — APPOINTMENT (OUTPATIENT)
Dept: ORTHOPEDIC SURGERY | Facility: CLINIC | Age: 55
End: 2025-04-01

## 2025-04-01 DIAGNOSIS — M65.331 TRIGGER FINGER, RIGHT MIDDLE FINGER: ICD-10-CM

## 2025-04-01 PROCEDURE — 20550 NJX 1 TENDON SHEATH/LIGAMENT: CPT | Mod: RT

## 2025-04-01 PROCEDURE — 99213 OFFICE O/P EST LOW 20 MIN: CPT | Mod: 25

## 2025-04-07 ENCOUNTER — APPOINTMENT (OUTPATIENT)
Dept: OTOLARYNGOLOGY | Facility: CLINIC | Age: 55
End: 2025-04-07
Payer: COMMERCIAL

## 2025-04-07 VITALS — BODY MASS INDEX: 24.77 KG/M2 | WEIGHT: 173 LBS | HEIGHT: 70 IN

## 2025-04-07 DIAGNOSIS — C77.0 SECONDARY AND UNSPECIFIED MALIGNANT NEOPLASM OF LYMPH NODES OF HEAD, FACE AND NECK: ICD-10-CM

## 2025-04-07 DIAGNOSIS — R13.10 DYSPHAGIA, UNSPECIFIED: ICD-10-CM

## 2025-04-07 DIAGNOSIS — C02.1 MALIGNANT NEOPLASM OF BORDER OF TONGUE: ICD-10-CM

## 2025-04-07 DIAGNOSIS — I89.0 LYMPHEDEMA, NOT ELSEWHERE CLASSIFIED: ICD-10-CM

## 2025-04-07 PROCEDURE — 31575 DIAGNOSTIC LARYNGOSCOPY: CPT

## 2025-04-07 PROCEDURE — 99214 OFFICE O/P EST MOD 30 MIN: CPT | Mod: 25

## 2025-04-22 ENCOUNTER — OUTPATIENT (OUTPATIENT)
Dept: OUTPATIENT SERVICES | Facility: HOSPITAL | Age: 55
LOS: 1 days | End: 2025-04-22
Payer: COMMERCIAL

## 2025-04-22 ENCOUNTER — RESULT REVIEW (OUTPATIENT)
Age: 55
End: 2025-04-22

## 2025-04-22 VITALS
SYSTOLIC BLOOD PRESSURE: 147 MMHG | HEART RATE: 83 BPM | TEMPERATURE: 98 F | HEIGHT: 70 IN | OXYGEN SATURATION: 100 % | RESPIRATION RATE: 16 BRPM | WEIGHT: 173.06 LBS | DIASTOLIC BLOOD PRESSURE: 92 MMHG

## 2025-04-22 DIAGNOSIS — M16.12 UNILATERAL PRIMARY OSTEOARTHRITIS, LEFT HIP: ICD-10-CM

## 2025-04-22 DIAGNOSIS — J34.2 DEVIATED NASAL SEPTUM: Chronic | ICD-10-CM

## 2025-04-22 DIAGNOSIS — Z01.818 ENCOUNTER FOR OTHER PREPROCEDURAL EXAMINATION: ICD-10-CM

## 2025-04-22 DIAGNOSIS — Z90.49 ACQUIRED ABSENCE OF OTHER SPECIFIED PARTS OF DIGESTIVE TRACT: Chronic | ICD-10-CM

## 2025-04-22 DIAGNOSIS — Z98.890 OTHER SPECIFIED POSTPROCEDURAL STATES: Chronic | ICD-10-CM

## 2025-04-22 DIAGNOSIS — Z96.641 PRESENCE OF RIGHT ARTIFICIAL HIP JOINT: Chronic | ICD-10-CM

## 2025-04-22 DIAGNOSIS — M26.10 UNSPECIFIED ANOMALY OF JAW-CRANIAL BASE RELATIONSHIP: Chronic | ICD-10-CM

## 2025-04-22 DIAGNOSIS — S99.911A UNSPECIFIED INJURY OF RIGHT ANKLE, INITIAL ENCOUNTER: Chronic | ICD-10-CM

## 2025-04-22 DIAGNOSIS — S49.90XA UNSPECIFIED INJURY OF SHOULDER AND UPPER ARM, UNSPECIFIED ARM, INITIAL ENCOUNTER: Chronic | ICD-10-CM

## 2025-04-22 PROBLEM — I10 ESSENTIAL (PRIMARY) HYPERTENSION: Chronic | Status: INACTIVE | Noted: 2019-01-07 | Resolved: 2025-04-22

## 2025-04-22 LAB
A1C WITH ESTIMATED AVERAGE GLUCOSE RESULT: 6.7 % — HIGH (ref 4–5.6)
ALBUMIN SERPL ELPH-MCNC: 4.4 G/DL — SIGNIFICANT CHANGE UP (ref 3.5–5.2)
ALP SERPL-CCNC: 117 U/L — HIGH (ref 30–115)
ALT FLD-CCNC: 26 U/L — SIGNIFICANT CHANGE UP (ref 0–41)
ANION GAP SERPL CALC-SCNC: 12 MMOL/L — SIGNIFICANT CHANGE UP (ref 7–14)
APTT BLD: 32.4 SEC — SIGNIFICANT CHANGE UP (ref 27–39.2)
AST SERPL-CCNC: 36 U/L — SIGNIFICANT CHANGE UP (ref 0–41)
BASOPHILS # BLD AUTO: 0.04 K/UL — SIGNIFICANT CHANGE UP (ref 0–0.2)
BASOPHILS NFR BLD AUTO: 1 % — SIGNIFICANT CHANGE UP (ref 0–1)
BILIRUB SERPL-MCNC: 1.5 MG/DL — HIGH (ref 0.2–1.2)
BLD GP AB SCN SERPL QL: SIGNIFICANT CHANGE UP
BUN SERPL-MCNC: 9 MG/DL — LOW (ref 10–20)
CALCIUM SERPL-MCNC: 9.9 MG/DL — SIGNIFICANT CHANGE UP (ref 8.4–10.5)
CHLORIDE SERPL-SCNC: 100 MMOL/L — SIGNIFICANT CHANGE UP (ref 98–110)
CO2 SERPL-SCNC: 28 MMOL/L — SIGNIFICANT CHANGE UP (ref 17–32)
CREAT SERPL-MCNC: 0.7 MG/DL — SIGNIFICANT CHANGE UP (ref 0.7–1.5)
EGFR: 109 ML/MIN/1.73M2 — SIGNIFICANT CHANGE UP
EGFR: 109 ML/MIN/1.73M2 — SIGNIFICANT CHANGE UP
EOSINOPHIL # BLD AUTO: 0.27 K/UL — SIGNIFICANT CHANGE UP (ref 0–0.7)
EOSINOPHIL NFR BLD AUTO: 6.8 % — SIGNIFICANT CHANGE UP (ref 0–8)
ESTIMATED AVERAGE GLUCOSE: 146 MG/DL — HIGH (ref 68–114)
GLUCOSE SERPL-MCNC: 133 MG/DL — HIGH (ref 70–99)
HCT VFR BLD CALC: 45.2 % — SIGNIFICANT CHANGE UP (ref 42–52)
HGB BLD-MCNC: 15.5 G/DL — SIGNIFICANT CHANGE UP (ref 14–18)
IMM GRANULOCYTES NFR BLD AUTO: 0.5 % — HIGH (ref 0.1–0.3)
INR BLD: 0.95 RATIO — SIGNIFICANT CHANGE UP (ref 0.65–1.3)
LYMPHOCYTES # BLD AUTO: 0.5 K/UL — LOW (ref 1.2–3.4)
LYMPHOCYTES # BLD AUTO: 12.5 % — LOW (ref 20.5–51.1)
MCHC RBC-ENTMCNC: 34.2 PG — HIGH (ref 27–31)
MCHC RBC-ENTMCNC: 34.3 G/DL — SIGNIFICANT CHANGE UP (ref 32–37)
MCV RBC AUTO: 99.8 FL — HIGH (ref 80–94)
MONOCYTES # BLD AUTO: 0.5 K/UL — SIGNIFICANT CHANGE UP (ref 0.1–0.6)
MONOCYTES NFR BLD AUTO: 12.5 % — HIGH (ref 1.7–9.3)
MRSA PCR RESULT.: NEGATIVE — SIGNIFICANT CHANGE UP
NEUTROPHILS # BLD AUTO: 2.67 K/UL — SIGNIFICANT CHANGE UP (ref 1.4–6.5)
NEUTROPHILS NFR BLD AUTO: 66.7 % — SIGNIFICANT CHANGE UP (ref 42.2–75.2)
NRBC BLD AUTO-RTO: 0 /100 WBCS — SIGNIFICANT CHANGE UP (ref 0–0)
PLATELET # BLD AUTO: 138 K/UL — SIGNIFICANT CHANGE UP (ref 130–400)
PMV BLD: 9.2 FL — SIGNIFICANT CHANGE UP (ref 7.4–10.4)
POTASSIUM SERPL-MCNC: 4.1 MMOL/L — SIGNIFICANT CHANGE UP (ref 3.5–5)
POTASSIUM SERPL-SCNC: 4.1 MMOL/L — SIGNIFICANT CHANGE UP (ref 3.5–5)
PROT SERPL-MCNC: 7.4 G/DL — SIGNIFICANT CHANGE UP (ref 6–8)
PROTHROM AB SERPL-ACNC: 11.2 SEC — SIGNIFICANT CHANGE UP (ref 9.95–12.87)
RBC # BLD: 4.53 M/UL — LOW (ref 4.7–6.1)
RBC # FLD: 12.3 % — SIGNIFICANT CHANGE UP (ref 11.5–14.5)
SODIUM SERPL-SCNC: 140 MMOL/L — SIGNIFICANT CHANGE UP (ref 135–146)
WBC # BLD: 4 K/UL — LOW (ref 4.8–10.8)
WBC # FLD AUTO: 4 K/UL — LOW (ref 4.8–10.8)

## 2025-04-22 PROCEDURE — 87640 STAPH A DNA AMP PROBE: CPT

## 2025-04-22 PROCEDURE — 36415 COLL VENOUS BLD VENIPUNCTURE: CPT

## 2025-04-22 PROCEDURE — 83036 HEMOGLOBIN GLYCOSYLATED A1C: CPT

## 2025-04-22 PROCEDURE — 85025 COMPLETE CBC W/AUTO DIFF WBC: CPT

## 2025-04-22 PROCEDURE — 73502 X-RAY EXAM HIP UNI 2-3 VIEWS: CPT | Mod: LT

## 2025-04-22 PROCEDURE — 87641 MR-STAPH DNA AMP PROBE: CPT

## 2025-04-22 PROCEDURE — 86900 BLOOD TYPING SEROLOGIC ABO: CPT

## 2025-04-22 PROCEDURE — 86850 RBC ANTIBODY SCREEN: CPT

## 2025-04-22 PROCEDURE — 86901 BLOOD TYPING SEROLOGIC RH(D): CPT

## 2025-04-22 PROCEDURE — 99214 OFFICE O/P EST MOD 30 MIN: CPT | Mod: 25

## 2025-04-22 PROCEDURE — 73502 X-RAY EXAM HIP UNI 2-3 VIEWS: CPT | Mod: 26,LT

## 2025-04-22 PROCEDURE — 93005 ELECTROCARDIOGRAM TRACING: CPT

## 2025-04-22 PROCEDURE — 85730 THROMBOPLASTIN TIME PARTIAL: CPT

## 2025-04-22 PROCEDURE — 85610 PROTHROMBIN TIME: CPT

## 2025-04-22 PROCEDURE — 93010 ELECTROCARDIOGRAM REPORT: CPT

## 2025-04-22 PROCEDURE — 80053 COMPREHEN METABOLIC PANEL: CPT

## 2025-04-22 NOTE — H&P PST ADULT - NSICDXPASTMEDICALHX_GEN_ALL_CORE_FT
PAST MEDICAL HISTORY:  Asthma no recent asthma attacks as per pt    Diabetes mellitus     H/O carpal tunnel repair     Heart murmur     History of celiac disease     Low backache     OA (osteoarthritis)     Oral leukoplakia     Oral lichen planus     Tongue cancer

## 2025-04-22 NOTE — H&P PST ADULT - HISTORY OF PRESENT ILLNESS
Patient is a  54 year old  male presenting to PAST in preparation for  Left Total Hip Replacement on  5/12 under regional anesthesia by   pt w/ hx OA, had right hip replaced 2019, past yr left hip worsening. pain is worst w/ ambulation, pain improves w/ rest.  presently no pain while seated      PATIENT/ Guardian CURRENTLY DENIES CHEST PAIN  SHORTNESS OF BREATH  PALPITATIONS,  DYSURIA, OR UPPER RESPIRATORY INFECTION IN PAST 2 WEEKS  denies travel outside the [place] in the past 30 days    Anesthesia Alert  YES--Difficult Airway CLASS 4  YES--History of neck surgery or radiation 2024 Tongue ca, neck dissection& radiation  NO--Limited ROM of neck  NO--History of Malignant hyperthermia  NO--No personal or family history of Pseudocholinesterase deficiency.  NO--Prior Anesthesia Complication  NO--Latex Allergy  NO--Loose teeth  NO--History of Rheumatoid Arthritis  NO--Bleeding risk  NO--CHITO  NO--Other_____    PT/Guardian DENIES ANY RASHES, ABRASION, OR OPEN WOUNDS OR CUTS    AS PER THE PT/Guardian, THIS IS HIS/HER COMPLETE MEDICAL AND SURGICAL HX, INCLUDING MEDICATIONS PRESCRIBED AND OVER THE COUNTER    Patient/ Guardian verbalized understanding of instructions and was given the opportunity to ask questions and have them answered.    pt/ Guardian denies any suicidal ideation or thoughts, pt states not a threat to self or others    Revised Cardiac Risk Index for Pre-Operative Risk from [email or web address]  on 4/22/2025  ** All calculations should be rechecked by clinician prior to use **    RESULT SUMMARY:  1 points  RCRI Score    6.0 %  Risk of major cardiac event      INPUTS:  High-risk surgery —> 0 = No  History of ischemic heart disease —> 0 = No  History of congestive heart failure —> 0 = No  History of cerebrovascular disease —> 0 = No  Pre-operative treatment with insulin —> 1 = Yes  Pre-operative creatinine >2 mg/dL / 176.8 µmol/L —> 0 = No      [facility] Activity Status Index (DASI) from [email or web address]  on 4/22/2025  ** All calculations should be rechecked by clinician prior to use **    RESULT SUMMARY:  38.2 points  The higher the score (maximum 58.2), the higher the functional status.    7.44 METs      INPUTS:  Take care of self —> 2.75 = Yes  Walk indoors —> 1.75 = Yes  Walk 1&ndash;2 blocks on level ground —> 2.75 = Yes  Climb a flight of stairs or walk up a hill —> 5.5 = Yes  Run a short distance —> 8 = Yes  Do light work around the house —> 2.7 = Yes  Do moderate work around the house —> 3.5 = Yes  Do heavy work around the house —> 0 = No  Do yardwork —> 0 = No  Have sexual relations —> 5.25 = Yes  Participate in moderate recreational activities —> 6 = Yes  Participate in strenuous sports —> 0 = No

## 2025-04-23 DIAGNOSIS — Z01.818 ENCOUNTER FOR OTHER PREPROCEDURAL EXAMINATION: ICD-10-CM

## 2025-04-23 DIAGNOSIS — M16.12 UNILATERAL PRIMARY OSTEOARTHRITIS, LEFT HIP: ICD-10-CM

## 2025-05-06 ENCOUNTER — APPOINTMENT (OUTPATIENT)
Dept: ORTHOPEDIC SURGERY | Facility: CLINIC | Age: 55
End: 2025-05-06

## 2025-05-08 ENCOUNTER — APPOINTMENT (OUTPATIENT)
Dept: RADIATION ONCOLOGY | Facility: HOSPITAL | Age: 55
End: 2025-05-08
Payer: COMMERCIAL

## 2025-05-08 ENCOUNTER — OUTPATIENT (OUTPATIENT)
Dept: OUTPATIENT SERVICES | Facility: HOSPITAL | Age: 55
LOS: 1 days | End: 2025-05-08
Payer: COMMERCIAL

## 2025-05-08 VITALS
DIASTOLIC BLOOD PRESSURE: 83 MMHG | BODY MASS INDEX: 25.62 KG/M2 | SYSTOLIC BLOOD PRESSURE: 125 MMHG | HEART RATE: 69 BPM | OXYGEN SATURATION: 98 % | TEMPERATURE: 98.5 F | RESPIRATION RATE: 16 BRPM | WEIGHT: 179 LBS | HEIGHT: 70 IN

## 2025-05-08 DIAGNOSIS — C77.0 SECONDARY AND UNSPECIFIED MALIGNANT NEOPLASM OF LYMPH NODES OF HEAD, FACE AND NECK: ICD-10-CM

## 2025-05-08 DIAGNOSIS — Z90.49 ACQUIRED ABSENCE OF OTHER SPECIFIED PARTS OF DIGESTIVE TRACT: Chronic | ICD-10-CM

## 2025-05-08 DIAGNOSIS — M26.10 UNSPECIFIED ANOMALY OF JAW-CRANIAL BASE RELATIONSHIP: Chronic | ICD-10-CM

## 2025-05-08 DIAGNOSIS — Z98.890 OTHER SPECIFIED POSTPROCEDURAL STATES: Chronic | ICD-10-CM

## 2025-05-08 DIAGNOSIS — S99.911A UNSPECIFIED INJURY OF RIGHT ANKLE, INITIAL ENCOUNTER: Chronic | ICD-10-CM

## 2025-05-08 DIAGNOSIS — S49.90XA UNSPECIFIED INJURY OF SHOULDER AND UPPER ARM, UNSPECIFIED ARM, INITIAL ENCOUNTER: Chronic | ICD-10-CM

## 2025-05-08 DIAGNOSIS — C02.1 MALIGNANT NEOPLASM OF BORDER OF TONGUE: ICD-10-CM

## 2025-05-08 DIAGNOSIS — J34.2 DEVIATED NASAL SEPTUM: Chronic | ICD-10-CM

## 2025-05-08 DIAGNOSIS — Z96.641 PRESENCE OF RIGHT ARTIFICIAL HIP JOINT: Chronic | ICD-10-CM

## 2025-05-08 PROCEDURE — G2211 COMPLEX E/M VISIT ADD ON: CPT | Mod: NC

## 2025-05-08 PROCEDURE — 99212 OFFICE O/P EST SF 10 MIN: CPT

## 2025-05-08 PROCEDURE — 99213 OFFICE O/P EST LOW 20 MIN: CPT

## 2025-05-09 DIAGNOSIS — C02.1 MALIGNANT NEOPLASM OF BORDER OF TONGUE: ICD-10-CM

## 2025-05-12 ENCOUNTER — INPATIENT (INPATIENT)
Facility: HOSPITAL | Age: 55
LOS: 0 days | Discharge: HOME HEALTH | DRG: 470 | End: 2025-05-13
Attending: ORTHOPAEDIC SURGERY | Admitting: ORTHOPAEDIC SURGERY
Payer: COMMERCIAL

## 2025-05-12 ENCOUNTER — RESULT REVIEW (OUTPATIENT)
Age: 55
End: 2025-05-12

## 2025-05-12 ENCOUNTER — APPOINTMENT (OUTPATIENT)
Dept: ORTHOPEDIC SURGERY | Facility: HOSPITAL | Age: 55
End: 2025-05-12

## 2025-05-12 VITALS
RESPIRATION RATE: 18 BRPM | WEIGHT: 175.05 LBS | TEMPERATURE: 98 F | OXYGEN SATURATION: 97 % | DIASTOLIC BLOOD PRESSURE: 88 MMHG | HEART RATE: 75 BPM | SYSTOLIC BLOOD PRESSURE: 160 MMHG | HEIGHT: 70 IN

## 2025-05-12 DIAGNOSIS — Z96.641 PRESENCE OF RIGHT ARTIFICIAL HIP JOINT: Chronic | ICD-10-CM

## 2025-05-12 DIAGNOSIS — Z98.890 OTHER SPECIFIED POSTPROCEDURAL STATES: Chronic | ICD-10-CM

## 2025-05-12 DIAGNOSIS — S99.911A UNSPECIFIED INJURY OF RIGHT ANKLE, INITIAL ENCOUNTER: Chronic | ICD-10-CM

## 2025-05-12 DIAGNOSIS — M26.10 UNSPECIFIED ANOMALY OF JAW-CRANIAL BASE RELATIONSHIP: Chronic | ICD-10-CM

## 2025-05-12 DIAGNOSIS — Z90.49 ACQUIRED ABSENCE OF OTHER SPECIFIED PARTS OF DIGESTIVE TRACT: Chronic | ICD-10-CM

## 2025-05-12 DIAGNOSIS — M16.12 UNILATERAL PRIMARY OSTEOARTHRITIS, LEFT HIP: ICD-10-CM

## 2025-05-12 DIAGNOSIS — S49.90XA UNSPECIFIED INJURY OF SHOULDER AND UPPER ARM, UNSPECIFIED ARM, INITIAL ENCOUNTER: Chronic | ICD-10-CM

## 2025-05-12 DIAGNOSIS — J34.2 DEVIATED NASAL SEPTUM: Chronic | ICD-10-CM

## 2025-05-12 LAB
GLUCOSE BLDC GLUCOMTR-MCNC: 172 MG/DL — HIGH (ref 70–99)
GLUCOSE BLDC GLUCOMTR-MCNC: 174 MG/DL — HIGH (ref 70–99)
GLUCOSE BLDC GLUCOMTR-MCNC: 228 MG/DL — HIGH (ref 70–99)
GLUCOSE BLDC GLUCOMTR-MCNC: 333 MG/DL — HIGH (ref 70–99)

## 2025-05-12 PROCEDURE — 97110 THERAPEUTIC EXERCISES: CPT | Mod: GP

## 2025-05-12 PROCEDURE — 94010 BREATHING CAPACITY TEST: CPT

## 2025-05-12 PROCEDURE — 97165 OT EVAL LOW COMPLEX 30 MIN: CPT | Mod: GO

## 2025-05-12 PROCEDURE — 36415 COLL VENOUS BLD VENIPUNCTURE: CPT

## 2025-05-12 PROCEDURE — 88305 TISSUE EXAM BY PATHOLOGIST: CPT | Mod: 26

## 2025-05-12 PROCEDURE — 27130 TOTAL HIP ARTHROPLASTY: CPT | Mod: LT

## 2025-05-12 PROCEDURE — 80048 BASIC METABOLIC PNL TOTAL CA: CPT

## 2025-05-12 PROCEDURE — 97535 SELF CARE MNGMENT TRAINING: CPT | Mod: GO

## 2025-05-12 PROCEDURE — 82962 GLUCOSE BLOOD TEST: CPT

## 2025-05-12 PROCEDURE — 97162 PT EVAL MOD COMPLEX 30 MIN: CPT | Mod: GP

## 2025-05-12 PROCEDURE — 88311 DECALCIFY TISSUE: CPT | Mod: 26

## 2025-05-12 PROCEDURE — 85027 COMPLETE CBC AUTOMATED: CPT

## 2025-05-12 PROCEDURE — 97116 GAIT TRAINING THERAPY: CPT | Mod: GP

## 2025-05-12 RX ORDER — SODIUM CHLORIDE 9 G/1000ML
1000 INJECTION, SOLUTION INTRAVENOUS
Refills: 0 | Status: DISCONTINUED | OUTPATIENT
Start: 2025-05-12 | End: 2025-05-12

## 2025-05-12 RX ORDER — CELECOXIB 50 MG/1
200 CAPSULE ORAL EVERY 12 HOURS
Refills: 0 | Status: DISCONTINUED | OUTPATIENT
Start: 2025-05-13 | End: 2025-05-13

## 2025-05-12 RX ORDER — POLYETHYLENE GLYCOL 3350 17 G/17G
17 POWDER, FOR SOLUTION ORAL AT BEDTIME
Refills: 0 | Status: DISCONTINUED | OUTPATIENT
Start: 2025-05-12 | End: 2025-05-13

## 2025-05-12 RX ORDER — MAGNESIUM, ALUMINUM HYDROXIDE 200-200 MG
30 TABLET,CHEWABLE ORAL
Refills: 0 | Status: DISCONTINUED | OUTPATIENT
Start: 2025-05-12 | End: 2025-05-13

## 2025-05-12 RX ORDER — ACETAMINOPHEN 500 MG/5ML
650 LIQUID (ML) ORAL EVERY 6 HOURS
Refills: 0 | Status: DISCONTINUED | OUTPATIENT
Start: 2025-05-12 | End: 2025-05-13

## 2025-05-12 RX ORDER — ONDANSETRON HCL/PF 4 MG/2 ML
4 VIAL (ML) INJECTION ONCE
Refills: 0 | Status: DISCONTINUED | OUTPATIENT
Start: 2025-05-12 | End: 2025-05-12

## 2025-05-12 RX ORDER — CEFAZOLIN SODIUM IN 0.9 % NACL 3 G/100 ML
2000 INTRAVENOUS SOLUTION, PIGGYBACK (ML) INTRAVENOUS EVERY 8 HOURS
Refills: 0 | Status: COMPLETED | OUTPATIENT
Start: 2025-05-12 | End: 2025-05-13

## 2025-05-12 RX ORDER — MAGNESIUM HYDROXIDE 400 MG/5ML
30 SUSPENSION ORAL DAILY
Refills: 0 | Status: DISCONTINUED | OUTPATIENT
Start: 2025-05-12 | End: 2025-05-13

## 2025-05-12 RX ORDER — TRAMADOL HYDROCHLORIDE 50 MG/1
50 TABLET, FILM COATED ORAL EVERY 4 HOURS
Refills: 0 | Status: DISCONTINUED | OUTPATIENT
Start: 2025-05-12 | End: 2025-05-13

## 2025-05-12 RX ORDER — HYDROMORPHONE/SOD CHLOR,ISO/PF 2 MG/10 ML
0.5 SYRINGE (ML) INJECTION
Refills: 0 | Status: DISCONTINUED | OUTPATIENT
Start: 2025-05-12 | End: 2025-05-12

## 2025-05-12 RX ORDER — CELECOXIB 50 MG/1
400 CAPSULE ORAL ONCE
Refills: 0 | Status: COMPLETED | OUTPATIENT
Start: 2025-05-12 | End: 2025-05-12

## 2025-05-12 RX ORDER — ONDANSETRON HCL/PF 4 MG/2 ML
4 VIAL (ML) INJECTION EVERY 6 HOURS
Refills: 0 | Status: DISCONTINUED | OUTPATIENT
Start: 2025-05-12 | End: 2025-05-13

## 2025-05-12 RX ORDER — MONTELUKAST SODIUM 10 MG/1
10 TABLET ORAL DAILY
Refills: 0 | Status: DISCONTINUED | OUTPATIENT
Start: 2025-05-12 | End: 2025-05-13

## 2025-05-12 RX ORDER — ASPIRIN 325 MG
81 TABLET ORAL EVERY 12 HOURS
Refills: 0 | Status: DISCONTINUED | OUTPATIENT
Start: 2025-05-12 | End: 2025-05-13

## 2025-05-12 RX ORDER — KETOROLAC TROMETHAMINE 30 MG/ML
15 INJECTION, SOLUTION INTRAMUSCULAR; INTRAVENOUS EVERY 6 HOURS
Refills: 0 | Status: COMPLETED | OUTPATIENT
Start: 2025-05-12 | End: 2025-05-13

## 2025-05-12 RX ORDER — SENNA 187 MG
2 TABLET ORAL AT BEDTIME
Refills: 0 | Status: DISCONTINUED | OUTPATIENT
Start: 2025-05-12 | End: 2025-05-13

## 2025-05-12 RX ADMIN — Medication 650 MILLIGRAM(S): at 17:43

## 2025-05-12 RX ADMIN — Medication 81 MILLIGRAM(S): at 17:44

## 2025-05-12 RX ADMIN — Medication 650 MILLIGRAM(S): at 12:43

## 2025-05-12 RX ADMIN — Medication 2 TABLET(S): at 21:33

## 2025-05-12 RX ADMIN — POLYETHYLENE GLYCOL 3350 17 GRAM(S): 17 POWDER, FOR SOLUTION ORAL at 21:34

## 2025-05-12 RX ADMIN — Medication 75 MILLILITER(S): at 21:35

## 2025-05-12 RX ADMIN — Medication 650 MILLIGRAM(S): at 18:13

## 2025-05-12 RX ADMIN — KETOROLAC TROMETHAMINE 15 MILLIGRAM(S): 30 INJECTION, SOLUTION INTRAMUSCULAR; INTRAVENOUS at 17:42

## 2025-05-12 RX ADMIN — CELECOXIB 400 MILLIGRAM(S): 50 CAPSULE ORAL at 06:36

## 2025-05-12 RX ADMIN — Medication 650 MILLIGRAM(S): at 12:18

## 2025-05-12 RX ADMIN — CELECOXIB 400 MILLIGRAM(S): 50 CAPSULE ORAL at 10:15

## 2025-05-12 RX ADMIN — Medication 100 MILLIGRAM(S): at 16:23

## 2025-05-12 RX ADMIN — Medication 75 MILLILITER(S): at 14:30

## 2025-05-12 RX ADMIN — KETOROLAC TROMETHAMINE 15 MILLIGRAM(S): 30 INJECTION, SOLUTION INTRAMUSCULAR; INTRAVENOUS at 17:53

## 2025-05-12 NOTE — PHYSICAL THERAPY INITIAL EVALUATION ADULT - PERTINENT HX OF CURRENT PROBLEM, REHAB EVAL
Pt is a 53 y/o male with dx of elective L THR with h/o L hip OA under regional anesthesia with anterior approach seen pod #0.

## 2025-05-12 NOTE — OCCUPATIONAL THERAPY INITIAL EVALUATION ADULT - ADDITIONAL COMMENTS
PLOF obtained from pt. Per pt, RW and 3:1 commode delivered to home prior to surgery.     (+) driving

## 2025-05-12 NOTE — OCCUPATIONAL THERAPY INITIAL EVALUATION ADULT - TRANSFER SAFETY CONCERNS NOTED: TUB, REHAB EVAL
Pt educated on sequencing for safe tub transfer. Pt to have girlfriend present during tub transfer to increase safety./decreased weight-shifting ability

## 2025-05-12 NOTE — PATIENT PROFILE ADULT - FUNCTIONAL ASSESSMENT - DAILY ACTIVITY SECTION LABEL
Medication: Timolol right eye only  Surgery: none  Diagnosis: Ocular Hypertension, right eye  Optic nerve tomography: 9-19-16  Visual field: 9-19-16  Pachy: 568, 632  Family history of glaucoma: no  Dilated exam: 9-19-16          Chief Complaint   Patient presents with   â¢ Follow-up     Patient is here for a pressure check to follow ocular hypertension. Patient is using Timolol both eyes daily. She said she forgot to use Timolol today but she usually uses it. Patient said she doesn't have her blood pressure medication right now so her blood pressure is high. ROS:  1. Do you have pain?  yes  2. Do you have fever, chills, sweats or weight change? no  3. Do you have headaches or seizures? yes  4. Do you have ringing in your ears or hearing loss? no  5. Do you have chest pain, palpitations or heart murmur? no  6. Do you have shortness of breath or wheezing? no  7. Do you have abdominal pain, nausea, vomiting, diarrhea or constipation? no  8. Do you have pain or difficulty with urination? no  9. Do you have joint or back pain? yes  10. Do you have weakness, numbness or tingling? no  11. Do you have skin changes such as a rash? no  12. Do you experience easy bruising or bleeding? yes  13. Are you depressed? no    Ocular History:  1. Have you ever been diagnosed with an eye disease? Ocular Hypertension, right eye  2. Do you use any ocular medications/eye drops? Timoolol right eye only  3. Have you ever had an eye injury? no  4. Have you ever had eye surgery? Bilateral cataract surgery and retinal detachment repair  5. Does anyone in your family have an eye disease? no  6. When was your last eye exam? 9/19/16  The patient is here today for an intraocular pressure check due to a history of ocular hypertension. Patient is using Timolol both eyes daily. She said she forgot to use Timolol today but she usually uses it.       Patient said she doesn't have her blood pressure medication right now so her blood pressure is "high.      Current Medications    AMLODIPINE (NORVASC) 5 MG TABLET    Take 1 tablet by mouth daily. AMMONIUM LACTATE (AMLACTIN) 12 % LOTION    Apply topically as needed for Dry Skin. ASPIRIN 81 MG TABLET    Take 1 tablet by mouth daily. ATORVASTATIN (LIPITOR) 40 MG TABLET    Take 1 tablet by mouth daily. BLOOD GLUCOSE MONITORING SUPPL W/DEVICE KIT    Voice meter due to patient blindness Test glucoses 3 times daily. DX code:E11.39, E11.40, E11.59    BLOOD GLUCOSE TEST STRIP    Test blood sugar 3 times daily - strips for voice meter due to patient blindness Test glucoses 3 times daily. DX code:E11.39, E11.40, E11.59    BUPRENORPHINE (BUTRANS) 10 MCG/HR PATCH    Place 1 patch onto the skin every 7 days. Indications: not taking     CHOLECALCIFEROL (VITAMIN D) 2000 UNITS CAPSULE    Take 1 capsule by mouth daily. DISPENSE    Pen needles    DIVALPROEX (DEPAKOTE ER) 500 MG 24 HR ER TABLET    Take 1 tablet by mouth 2 times daily. FLUOXETINE (PROZAC) 20 MG CAPSULE    Take 1 capsule by mouth daily. INSULIN GLARGINE (LANTUS SOLOSTAR) 100 UNIT/ML PEN-INJECTOR    45u am and 45 u pm    INSULIN PEN NEEDLE (B-D U/F PEN NEEDLE 5/16"") 31G X 8 MM MISC    2 times daily. With Levemir FlexPen    INSULIN SYRINGE 31G X 5/16\"" 0.5 ML MISC    Use as directed with insulin    ISOSORBIDE MONONITRATE (IMDUR) 30 MG 24 HR TABLET    Take 1 tablet by mouth daily. LANCETS MISC. KIT    Use to test sugars 3 x a day dx 250.01    LANCETS THIN MISC    To use with meter 4 times daily    NOVOLOG FLEXPEN 100 UNIT/ML PEN-INJECTOR    18 units before meals Plus Sliding scale 1:30>150    PANTOPRAZOLE (PROTONIX) 40 MG TABLET    Take 1 tablet by mouth daily. POLYETHYLENE GLYCOL (MIRALAX) POWDER    Take 17 g by mouth daily. PREGABALIN (LYRICA) 75 MG CAPSULE    Take 1 capsule by mouth 2 times daily. TIMOLOL (TIMOPTIC) 0.5 % OPHTHALMIC SOLUTION    Place 1 drop into both eyes daily.     VALSARTAN-HYDROCHLOROTHIAZIDE (DIOVAN-HCT) 320-25 MG " PER TABLET    Take 1 tablet by mouth daily. IOP:  Tonometry (Applanation, 2:28 PM)      Right Left   Pressure 22 4         Target Pressure      Right Left   Target 10 10                   1. Ocular hypertension of right eye        Return in about 4 months (around 9/3/2017) for pressure check. Timolol daily in the right eye. Keturah Rowe  [facility] .

## 2025-05-12 NOTE — PHYSICAL THERAPY INITIAL EVALUATION ADULT - LIVES WITH, PROFILE
Per pt, will be staying at his girlfriend's basement apartment upon d/c +5 steps to enter with (R) handrail then level./significant other

## 2025-05-12 NOTE — OCCUPATIONAL THERAPY INITIAL EVALUATION ADULT - LIVES WITH, PROFILE
Per pt, will be staying at his girlfriend's basement apartment upon d/c +5 steps to enter with (R) handrail then level +tub +grab bars +standard toilet

## 2025-05-12 NOTE — OCCUPATIONAL THERAPY INITIAL EVALUATION ADULT - GENERAL OBSERVATIONS, REHAB EVAL
14:35-15:05; chart reviewed, ok to treat by Occupational Therapist as confirmed by RN Dom, Pt received seated in bedside chair +mepilex dressing (L) hip +heplock in NAD. Pt denied pain at rest and in agreement with OT IE.

## 2025-05-12 NOTE — PHYSICAL THERAPY INITIAL EVALUATION ADULT - GENERAL OBSERVATIONS, REHAB EVAL
17:00-17:30. Chart reviewed; confirmed with RN to see the pt for PT. Pt ready for PT; received in chair with no complain of pain and in NAD. +hep lock, +Mepilex L hip. Agreeable for PT evaluation.

## 2025-05-12 NOTE — ASU PATIENT PROFILE, ADULT - FALL HARM RISK - HARM RISK INTERVENTIONS

## 2025-05-12 NOTE — PATIENT PROFILE ADULT - FALL HARM RISK - HARM RISK INTERVENTIONS
Assistance with ambulation/Assistance OOB with selected safe patient handling equipment/Communicate Risk of Fall with Harm to all staff/Discuss with provider need for PT consult/Monitor gait and stability/Provide patient with walking aids - walker, cane, crutches/Reinforce activity limits and safety measures with patient and family/Sit up slowly, dangle for a short time, stand at bedside before walking/Tailored Fall Risk Interventions/Use of alarms - bed, chair and/or voice tab/Visual Cue: Yellow wristband and red socks/Bed in lowest position, wheels locked, appropriate side rails in place/Call bell, personal items and telephone in reach/Instruct patient to call for assistance before getting out of bed or chair/Non-slip footwear when patient is out of bed/[place] to call system/Physically safe environment - no spills, clutter or unnecessary equipment/Purposeful Proactive Rounding/Room/bathroom lighting operational, light cord in reach

## 2025-05-13 ENCOUNTER — TRANSCRIPTION ENCOUNTER (OUTPATIENT)
Age: 55
End: 2025-05-13

## 2025-05-13 VITALS
RESPIRATION RATE: 18 BRPM | SYSTOLIC BLOOD PRESSURE: 123 MMHG | HEART RATE: 79 BPM | TEMPERATURE: 98 F | DIASTOLIC BLOOD PRESSURE: 77 MMHG | OXYGEN SATURATION: 97 %

## 2025-05-13 LAB
ANION GAP SERPL CALC-SCNC: 13 MMOL/L — SIGNIFICANT CHANGE UP (ref 7–14)
BUN SERPL-MCNC: 10 MG/DL — SIGNIFICANT CHANGE UP (ref 10–20)
CALCIUM SERPL-MCNC: 8.8 MG/DL — SIGNIFICANT CHANGE UP (ref 8.4–10.5)
CHLORIDE SERPL-SCNC: 102 MMOL/L — SIGNIFICANT CHANGE UP (ref 98–110)
CO2 SERPL-SCNC: 26 MMOL/L — SIGNIFICANT CHANGE UP (ref 17–32)
CREAT SERPL-MCNC: 0.9 MG/DL — SIGNIFICANT CHANGE UP (ref 0.7–1.5)
EGFR: 101 ML/MIN/1.73M2 — SIGNIFICANT CHANGE UP
EGFR: 101 ML/MIN/1.73M2 — SIGNIFICANT CHANGE UP
GLUCOSE SERPL-MCNC: 141 MG/DL — HIGH (ref 70–99)
HCT VFR BLD CALC: 32.7 % — LOW (ref 42–52)
HGB BLD-MCNC: 11.3 G/DL — LOW (ref 14–18)
MCHC RBC-ENTMCNC: 33.9 PG — HIGH (ref 27–31)
MCHC RBC-ENTMCNC: 34.6 G/DL — SIGNIFICANT CHANGE UP (ref 32–37)
MCV RBC AUTO: 98.2 FL — HIGH (ref 80–94)
NRBC BLD AUTO-RTO: 0 /100 WBCS — SIGNIFICANT CHANGE UP (ref 0–0)
PLATELET # BLD AUTO: 149 K/UL — SIGNIFICANT CHANGE UP (ref 130–400)
PMV BLD: 9.1 FL — SIGNIFICANT CHANGE UP (ref 7.4–10.4)
POTASSIUM SERPL-MCNC: 4.5 MMOL/L — SIGNIFICANT CHANGE UP (ref 3.5–5)
POTASSIUM SERPL-SCNC: 4.5 MMOL/L — SIGNIFICANT CHANGE UP (ref 3.5–5)
RBC # BLD: 3.33 M/UL — LOW (ref 4.7–6.1)
RBC # FLD: 11.8 % — SIGNIFICANT CHANGE UP (ref 11.5–14.5)
SODIUM SERPL-SCNC: 141 MMOL/L — SIGNIFICANT CHANGE UP (ref 135–146)
WBC # BLD: 8.21 K/UL — SIGNIFICANT CHANGE UP (ref 4.8–10.8)
WBC # FLD AUTO: 8.21 K/UL — SIGNIFICANT CHANGE UP (ref 4.8–10.8)

## 2025-05-13 PROCEDURE — 99222 1ST HOSP IP/OBS MODERATE 55: CPT

## 2025-05-13 RX ORDER — IBUPROFEN 200 MG
1 TABLET ORAL
Qty: 42 | Refills: 0
Start: 2025-05-13 | End: 2025-05-26

## 2025-05-13 RX ORDER — ACETAMINOPHEN 500 MG/5ML
2 LIQUID (ML) ORAL
Qty: 112 | Refills: 0
Start: 2025-05-13 | End: 2025-05-26

## 2025-05-13 RX ORDER — NALOXONE HYDROCHLORIDE 0.4 MG/ML
1 INJECTION, SOLUTION INTRAMUSCULAR; INTRAVENOUS; SUBCUTANEOUS
Qty: 1 | Refills: 0
Start: 2025-05-13

## 2025-05-13 RX ORDER — TRAMADOL HYDROCHLORIDE 50 MG/1
1 TABLET, FILM COATED ORAL
Qty: 30 | Refills: 0
Start: 2025-05-13

## 2025-05-13 RX ORDER — SENNA 187 MG
2 TABLET ORAL
Qty: 30 | Refills: 0
Start: 2025-05-13

## 2025-05-13 RX ORDER — ASPIRIN 325 MG
1 TABLET ORAL
Qty: 60 | Refills: 0
Start: 2025-05-13 | End: 2025-06-11

## 2025-05-13 RX ADMIN — Medication 650 MILLIGRAM(S): at 06:00

## 2025-05-13 RX ADMIN — KETOROLAC TROMETHAMINE 15 MILLIGRAM(S): 30 INJECTION, SOLUTION INTRAMUSCULAR; INTRAVENOUS at 06:00

## 2025-05-13 RX ADMIN — Medication 650 MILLIGRAM(S): at 00:18

## 2025-05-13 RX ADMIN — Medication 100 MILLIGRAM(S): at 00:18

## 2025-05-13 RX ADMIN — KETOROLAC TROMETHAMINE 15 MILLIGRAM(S): 30 INJECTION, SOLUTION INTRAMUSCULAR; INTRAVENOUS at 00:18

## 2025-05-13 RX ADMIN — KETOROLAC TROMETHAMINE 15 MILLIGRAM(S): 30 INJECTION, SOLUTION INTRAMUSCULAR; INTRAVENOUS at 00:45

## 2025-05-13 RX ADMIN — Medication 650 MILLIGRAM(S): at 05:28

## 2025-05-13 RX ADMIN — Medication 81 MILLIGRAM(S): at 05:28

## 2025-05-13 RX ADMIN — Medication 40 MILLIGRAM(S): at 05:29

## 2025-05-13 RX ADMIN — Medication 650 MILLIGRAM(S): at 00:45

## 2025-05-13 RX ADMIN — KETOROLAC TROMETHAMINE 15 MILLIGRAM(S): 30 INJECTION, SOLUTION INTRAMUSCULAR; INTRAVENOUS at 05:28

## 2025-05-13 RX ADMIN — Medication 1 APPLICATION(S): at 05:29

## 2025-05-13 RX ADMIN — Medication 75 MILLILITER(S): at 00:18

## 2025-05-13 NOTE — CONSULT NOTE ADULT - SUBJECTIVE AND OBJECTIVE BOX
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      History of total hip replacement, right  12/23/2020      Shoulder injury  left shoulder sx, open repair, 3 anchors 1995      Deviated septum  repair 22 yr ago      H/O hernia repair      Right ankle injury      S/P partial glossectomy          SOCIAL HISTORY  Social History:      Home Medications:  insulin: pt has insulin pump- Novolog (12 May 2025 06:24)  montelukast 10 mg oral tablet: 1 tab(s) orally once a day (12 May 2025 06:24)  pravastatin 40 mg oral tablet: 1 tab(s) orally once a day (12 May 2025 06:24)      ROS  General: Denies fevers, chills, nightsweats, weight loss  HEENT: Denies headache, rhinorrhea, sore throat, eye pain  CV: Denies CP, palpitations  PULM: Denies SOB, cough  GI: Denies abdominal pain, diarrhea  : Denies dysuria, hematuria  MSK: Denies arthralgias post op pain  SKIN: Denies rash   NEURO: Denies paresthesias, weakness  PSYCH: Denies depression    VITALS:  T(F): 97.5, Max: 98.1 (05-13-25 @ 04:07)  HR: 79  BP: 123/77  RR: 18Vital Signs Last 24 Hrs  T(C): 36.4 (13 May 2025 08:16), Max: 36.7 (12 May 2025 20:47)  T(F): 97.5 (13 May 2025 08:16), Max: 98.1 (13 May 2025 04:07)  HR: 79 (13 May 2025 08:16) (52 - 85)  BP: 123/77 (13 May 2025 08:16) (99/61 - 128/75)  BP(mean): 76 (13 May 2025 04:07) (76 - 92)  RR: 18 (13 May 2025 08:16) (13 - 19)  SpO2: 97% (13 May 2025 08:16) (97% - 100%)        PHYSICAL EXAM:  Gen: NAD, resting in bed  CV: Regular rate & regular rhythm  Lungs: CTABL no wheeze  Abdomen: Soft, NTND+ BS present  Ext: Warm, well perfused no CCE hip dressing intact   Neuro: non focal, awake, CN II-XII intact       TESTS & MEASUREMENTS:  labs personally reviewed:                        11.3   8.21  )-----------( 149      ( 13 May 2025 07:18 )             32.7     05-13    141  |  102  |  10  ----------------------------<  141[H]  4.5   |  26  |  0.9    Ca    8.8      13 May 2025 07:18          Urinalysis Basic - ( 13 May 2025 07:18 )    Color: x / Appearance: x / SG: x / pH: x  Gluc: 141 mg/dL / Ketone: x  / Bili: x / Urobili: x   Blood: x / Protein: x / Nitrite: x   Leuk Esterase: x / RBC: x / WBC x   Sq Epi: x / Non Sq Epi: x / Bacteria: x            QRS axis to [] ° and NSR at a rate of [] BPM. There was no atrial enlargement. There was no ventricular hypertrophy. There were no ST-T changes and all intervals were normal.      INFECTIOUS DISEASES TESTING  MRSA PCR Result.: Negative (04-22-25 @ 15:54)      RADIOLOGY & ADDITIONAL TESTS:  I have personally reviewed the last Chest xray  CXR      CT      CARDIOLOGY TESTING      MEDICATIONS  (STANDING):  acetaminophen     Tablet .. 650 milliGRAM(s) Oral every 6 hours  aspirin enteric coated 81 milliGRAM(s) Oral every 12 hours  celecoxib 200 milliGRAM(s) Oral every 12 hours  chlorhexidine 2% Cloths 1 Application(s) Topical <User Schedule>  ketorolac   Injectable 15 milliGRAM(s) IV Push every 6 hours  montelukast 10 milliGRAM(s) Oral daily  pantoprazole    Tablet 40 milliGRAM(s) Oral before breakfast  polyethylene glycol 3350 17 Gram(s) Oral at bedtime  senna 2 Tablet(s) Oral at bedtime  sodium chloride 0.9%. 1000 milliLiter(s) (75 mL/Hr) IV Continuous <Continuous>    MEDICATIONS  (PRN):  aluminum hydroxide/magnesium hydroxide/simethicone Suspension 30 milliLiter(s) Oral four times a day PRN Indigestion  magnesium hydroxide Suspension 30 milliLiter(s) Oral daily PRN Constipation  ondansetron Injectable 4 milliGRAM(s) IV Push every 6 hours PRN Nausea and/or Vomiting  traMADol 50 milliGRAM(s) Oral every 4 hours PRN Severe Pain (7 - 10)      ANTIBIOTICS:      All available historical data has been reviewed    ASSESSMENT  54y M admitted with Primary osteoarthritis of left hip        PROBLEMS       CANDI CASTANON  54y, Male  Allergy: No Known Allergies      CHIEF COMPLAINT: Total Hip Arthroplasty (13 May 2025 09:08)      HPI:    HPI:    FAMILY HISTORY:  Family history of osteoarthritis (Mother)      PAST MEDICAL & SURGICAL HISTORY:  Diabetes mellitus      Low backache      OA (osteoarthritis)      Asthma  no recent asthma attacks as per pt      H/O carpal tunnel repair      Heart murmur      Tongue cancer      History of celiac disease      Oral leukoplakia      Oral lichen planus      History of appendectomy  age 17      H/O arthroscopy  RIGHT ANKLE 10 yrs ago      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      History of total hip replacement, right  12/23/2020      Shoulder injury  left shoulder sx, open repair, 3 anchors 1995      Deviated septum  repair 22 yr ago      H/O hernia repair      Right ankle injury      S/P partial glossectomy          SOCIAL HISTORY  Social History:      Home Medications:  insulin: pt has insulin pump- Novolog (12 May 2025 06:24)  montelukast 10 mg oral tablet: 1 tab(s) orally once a day (12 May 2025 06:24)  pravastatin 40 mg oral tablet: 1 tab(s) orally once a day (12 May 2025 06:24)      ROS  General: Denies fevers, chills, nightsweats, weight loss  HEENT: Denies headache, rhinorrhea, sore throat, eye pain  CV: Denies CP, palpitations  PULM: Denies SOB, cough  GI: Denies abdominal pain, diarrhea  : Denies dysuria, hematuria  MSK: Denies arthralgias min post op pain  SKIN: Denies rash   NEURO: Denies paresthesias, weakness  PSYCH: Denies depression    VITALS:  T(F): 97.5, Max: 98.1 (05-13-25 @ 04:07)  HR: 79  BP: 123/77  RR: 18Vital Signs Last 24 Hrs  T(C): 36.4 (13 May 2025 08:16), Max: 36.7 (12 May 2025 20:47)  T(F): 97.5 (13 May 2025 08:16), Max: 98.1 (13 May 2025 04:07)  HR: 79 (13 May 2025 08:16) (52 - 85)  BP: 123/77 (13 May 2025 08:16) (99/61 - 128/75)  BP(mean): 76 (13 May 2025 04:07) (76 - 92)  RR: 18 (13 May 2025 08:16) (13 - 19)  SpO2: 97% (13 May 2025 08:16) (97% - 100%)        PHYSICAL EXAM:  Gen: NAD, resting in bed  CV: Regular rate & regular rhythm  Lungs: CTABL no wheeze  Abdomen: Soft, NTND+ BS present  Ext: Warm, well perfused no CCE hip dressing intact   Neuro: non focal, awake, CN II-XII intact       TESTS & MEASUREMENTS:  labs personally reviewed:                        11.3   8.21  )-----------( 149      ( 13 May 2025 07:18 )             32.7     05-13    141  |  102  |  10  ----------------------------<  141[H]  4.5   |  26  |  0.9    Ca    8.8      13 May 2025 07:18          Urinalysis Basic - ( 13 May 2025 07:18 )    Color: x / Appearance: x / SG: x / pH: x  Gluc: 141 mg/dL / Ketone: x  / Bili: x / Urobili: x   Blood: x / Protein: x / Nitrite: x   Leuk Esterase: x / RBC: x / WBC x   Sq Epi: x / Non Sq Epi: x / Bacteria: x            QRS axis to [] ° and NSR at a rate of [] BPM. There was no atrial enlargement. There was no ventricular hypertrophy. There were no ST-T changes and all intervals were normal.      INFECTIOUS DISEASES TESTING  MRSA PCR Result.: Negative (04-22-25 @ 15:54)      RADIOLOGY & ADDITIONAL TESTS:  I have personally reviewed the last Chest xray  CXR      CT      CARDIOLOGY TESTING      MEDICATIONS  (STANDING):  acetaminophen     Tablet .. 650 milliGRAM(s) Oral every 6 hours  aspirin enteric coated 81 milliGRAM(s) Oral every 12 hours  celecoxib 200 milliGRAM(s) Oral every 12 hours  chlorhexidine 2% Cloths 1 Application(s) Topical <User Schedule>  ketorolac   Injectable 15 milliGRAM(s) IV Push every 6 hours  montelukast 10 milliGRAM(s) Oral daily  pantoprazole    Tablet 40 milliGRAM(s) Oral before breakfast  polyethylene glycol 3350 17 Gram(s) Oral at bedtime  senna 2 Tablet(s) Oral at bedtime  sodium chloride 0.9%. 1000 milliLiter(s) (75 mL/Hr) IV Continuous <Continuous>    MEDICATIONS  (PRN):  aluminum hydroxide/magnesium hydroxide/simethicone Suspension 30 milliLiter(s) Oral four times a day PRN Indigestion  magnesium hydroxide Suspension 30 milliLiter(s) Oral daily PRN Constipation  ondansetron Injectable 4 milliGRAM(s) IV Push every 6 hours PRN Nausea and/or Vomiting  traMADol 50 milliGRAM(s) Oral every 4 hours PRN Severe Pain (7 - 10)      ANTIBIOTICS:      All available historical data has been reviewed    ASSESSMENT  54y M admitted with Primary osteoarthritis of left hip        PROBLEMS

## 2025-05-13 NOTE — DISCHARGE NOTE NURSING/CASE MANAGEMENT/SOCIAL WORK - NSDCPEFALRISK_GEN_ALL_CORE
For information on Fall & Injury Prevention, visit: [email or web address] OR  [email or web address] OR  https://www.cdc.gov/steadi/patient.html

## 2025-05-13 NOTE — DISCHARGE NOTE PROVIDER - NSDCCPCAREPLAN_GEN_ALL_CORE_FT
Additional Notes: Patient consent was obtained to proceed with the visit and recommended plan of care after discussion of all risks and benefits, including the risks of COVID-19 exposure. Detail Level: Simple PRINCIPAL DISCHARGE DIAGNOSIS  Diagnosis: Arthritis of left hip  Assessment and Plan of Treatment: Keep surgical site clean and dry, may remove dressing in 6  days . Call your surgeon if any wound drainage, redness , increasing pain, fevers over 101 or if you have any questions or concerns.  Ice pack to affected area q4-6h as needed   You may shower with the bandage on and once it is removed. Once it is removed  , do not scrub surgical site. Do not apply any lotions/moisturizers/creams to surgical site.  Take aspirin 81 mg twice daily for 30 days to lower the risk of blood clots.  Call to make your  post op appointment if you do not have one already.

## 2025-05-13 NOTE — DISCHARGE NOTE PROVIDER - NSDCMRMEDTOKEN_GEN_ALL_CORE_FT
acetaminophen 325 mg oral tablet: 2 tab(s) orally every 6 hours post op pain  aspirin 81 mg oral delayed release tablet: 1 tab(s) orally every 12 hours x 30 days after Total Hip Arthroplasty to lower the risk of blood clots  ibuprofen 400 mg oral tablet: 1 tab(s) orally every 8 hours post op pain  insulin: pt has insulin pump- Novolog  montelukast 10 mg oral tablet: 1 tab(s) orally once a day  naloxone 4 mg/0.1 mL nasal spray: 1 spray(s) intranasally once as needed for od take as directed in the event of accidental overdose  pantoprazole 40 mg oral delayed release tablet: 1 tab(s) orally once a day (before a meal) GI prophylaxis x 30 days after Total Hip Arthroplasty  pravastatin 40 mg oral tablet: 1 tab(s) orally once a day  senna leaf extract oral tablet: 2 tab(s) orally once a day (at bedtime) while on pain medication to prevent constipation  traMADol 50 mg oral tablet: 1 tab(s) orally every 4 to 6 hours as needed for Severe Pain (7 - 10) post op pain MDD: 5

## 2025-05-13 NOTE — PROGRESS NOTE ADULT - SUBJECTIVE AND OBJECTIVE BOX
54y Male POD #  1    S/P left Total Hip Arthroplasty     Patient seen and examined at bedside . The patient is awake and alert in NAD. No complaints of chest pain, SOB, N/V.  Pain is controlled, the patient has ambulated and is voiding.     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 650 milliGRAM(s) Oral every 6 hours  aspirin enteric coated 81 milliGRAM(s) Oral every 12 hours  celecoxib 200 milliGRAM(s) Oral every 12 hours  chlorhexidine 2% Cloths 1 Application(s) Topical <User Schedule>  ketorolac   Injectable 15 milliGRAM(s) IV Push every 6 hours  montelukast 10 milliGRAM(s) Oral daily  pantoprazole    Tablet 40 milliGRAM(s) Oral before breakfast  polyethylene glycol 3350 17 Gram(s) Oral at bedtime  senna 2 Tablet(s) Oral at bedtime  sodium chloride 0.9%. 1000 milliLiter(s) (75 mL/Hr) IV Continuous <Continuous>    MEDICATIONS  (PRN):  aluminum hydroxide/magnesium hydroxide/simethicone Suspension 30 milliLiter(s) Oral four times a day PRN Indigestion  magnesium hydroxide Suspension 30 milliLiter(s) Oral daily PRN Constipation  ondansetron Injectable 4 milliGRAM(s) IV Push every 6 hours PRN Nausea and/or Vomiting  traMADol 50 milliGRAM(s) Oral every 4 hours PRN Severe Pain (7 - 10)        Vital Signs Last 24 Hrs  T(C): 36.4 (13 May 2025 08:16), Max: 36.7 (12 May 2025 20:47)  T(F): 97.5 (13 May 2025 08:16), Max: 98.1 (13 May 2025 04:07)  HR: 79 (13 May 2025 08:16) (52 - 85)  BP: 123/77 (13 May 2025 08:16) (99/61 - 128/75)  BP(mean): 76 (13 May 2025 04:07) (76 - 92)  RR: 18 (13 May 2025 08:16) (13 - 19)  SpO2: 97% (13 May 2025 08:16) (97% - 100%)                          11.3   8.21  )-----------( 149      ( 13 May 2025 07:18 )             32.7                     PE:  The patient was seen and examined at bedside          A&OX3, NAD          left hip dressing C/D/I          Compartments soft, BLE Papo stockings and SCD in place          NVI, SILT           A/P:     # POD # 1      s/p left Total Hip Arthroplasty                 - OOB to Chair   -PT/OT - wbat  -post op abx complete  -Pain control - per pain protocol   -Incentive Spirometry   -DVT Prophylaxis - aspirin bid x 30 days   -GI ppx- continue Protonix   -f/u am labs   -discharge planning- home with home care

## 2025-05-13 NOTE — CONSULT NOTE ADULT - ASSESSMENT
Pre-Op Diagnosis	PRE-OP DIAGNOSIS:  Arthritis of left hip 12-May-2025 10:20:44  Saqib Ruff  Post-Op Diagnosis	POST-OP DIAGNOSIS:  Arthritis of left hip 12-May-2025 10:20:58  Saqib Ruff  Procedure	PROCEDURES:  Left hip replacement 12-May-2025 10:20:31  Saqib Ruff  Operative Findings	see dictation  Specimens	bone  Estimated Blood Loss	200 milliLiter(s)  Antibiotic Protocol	Followed protocol  Venous Thromboembolism Prophylaxis Therapy	asa  Comments/Other Details	anterior approach  Condition Post op	stable    POD#1  pain control   DVT prophylaxis  - aspirin 81mg q12 x 30 days  PT/OT  IS  Bowel Regimen  Ortho follow up appreciated - discussed with ortho this am  labs and vitals reviewed    #asthma - no exacerbation - alb prn  #DM - on insulin pump at home  CAPILLARY BLOOD GLUCOSE      POCT Blood Glucose.: 228 mg/dL (12 May 2025 21:13)  POCT Blood Glucose.: 333 mg/dL (12 May 2025 16:53)    #hyperlipidemia - may resume statin post op  #hx of celiac dz - no complaints    dc planning   recall prn  Pre-Op Diagnosis	PRE-OP DIAGNOSIS:  Arthritis of left hip 12-May-2025 10:20:44  Saqib Ruff  Post-Op Diagnosis	POST-OP DIAGNOSIS:  Arthritis of left hip 12-May-2025 10:20:58  Saqib Ruff  Procedure	PROCEDURES:  Left hip replacement 12-May-2025 10:20:31  Saqib Ruff  Operative Findings	see dictation  Specimens	bone  Estimated Blood Loss	200 milliLiter(s)  Antibiotic Protocol	Followed protocol  Venous Thromboembolism Prophylaxis Therapy	asa  Comments/Other Details	anterior approach  Condition Post op	stable    POD#1  pain control   DVT prophylaxis  - aspirin 81mg q12 x 30 days  PT/OT  IS  Bowel Regimen  Ortho follow up appreciated - discussed with ortho this am and bedside rn enma  labs and vitals reviewed    #asthma - no exacerbation - alb prn  #DM - on insulin pump at home  CAPILLARY BLOOD GLUCOSE      POCT Blood Glucose.: 228 mg/dL (12 May 2025 21:13)  POCT Blood Glucose.: 333 mg/dL (12 May 2025 16:53)    uncontrolled hyperglycemia here post op - pt anxious to go home - well versed on insulin pump and how to control his glucose    #hyperlipidemia - may resume statin post op  #hx of celiac dz - no complaints    dc planning   recall prn

## 2025-05-13 NOTE — DISCHARGE NOTE PROVIDER - CARE PROVIDER_API CALL
Gilda-Anam Das  Orthopaedic Surgery  [address] [place] [address]  Phone: [phone]  Fax: [phone]  Scheduled Appointment: 06/05/2025 02:15 PM

## 2025-05-13 NOTE — DISCHARGE NOTE PROVIDER - HOSPITAL COURSE
54 year old male admitted for an elective Total Hip Arthroplasty. The patient tolerated surgery well with no intra/post operative complications. The patient received intra/post operative antibiotics for infection prophylaxis and will be discharged on Aspirin 81mg twice daily for 30 days to lower the risk of blood clots. The patient worked with Physical Therapy while admitted to the hospital and is stable for discharge.

## 2025-05-13 NOTE — DISCHARGE NOTE NURSING/CASE MANAGEMENT/SOCIAL WORK - PATIENT PORTAL LINK FT
You can access the FollowMyHealth Patient Portal offered by [facility] by registering at the following website: [email or web address]. By joining [employer]’s FollowMyHealth portal, you will also be able to view your health information using other applications (apps) compatible with our system.

## 2025-05-13 NOTE — DISCHARGE NOTE NURSING/CASE MANAGEMENT/SOCIAL WORK - FINANCIAL ASSISTANCE
[facility] provides services at a reduced cost to those who are determined to be eligible through [facility]’s financial assistance program. Information regarding [facility]’s financial assistance program can be found by going to [email or web address] or by calling [phone].

## 2025-05-19 ENCOUNTER — RESULT REVIEW (OUTPATIENT)
Age: 55
End: 2025-05-19

## 2025-05-19 ENCOUNTER — OUTPATIENT (OUTPATIENT)
Dept: OUTPATIENT SERVICES | Facility: HOSPITAL | Age: 55
LOS: 1 days | End: 2025-05-19
Payer: COMMERCIAL

## 2025-05-19 DIAGNOSIS — Z90.49 ACQUIRED ABSENCE OF OTHER SPECIFIED PARTS OF DIGESTIVE TRACT: Chronic | ICD-10-CM

## 2025-05-19 DIAGNOSIS — Z98.890 OTHER SPECIFIED POSTPROCEDURAL STATES: Chronic | ICD-10-CM

## 2025-05-19 DIAGNOSIS — Z96.641 PRESENCE OF RIGHT ARTIFICIAL HIP JOINT: Chronic | ICD-10-CM

## 2025-05-19 DIAGNOSIS — C02.1 MALIGNANT NEOPLASM OF BORDER OF TONGUE: ICD-10-CM

## 2025-05-19 DIAGNOSIS — S99.911A UNSPECIFIED INJURY OF RIGHT ANKLE, INITIAL ENCOUNTER: Chronic | ICD-10-CM

## 2025-05-19 DIAGNOSIS — J34.2 DEVIATED NASAL SEPTUM: Chronic | ICD-10-CM

## 2025-05-19 DIAGNOSIS — S49.90XA UNSPECIFIED INJURY OF SHOULDER AND UPPER ARM, UNSPECIFIED ARM, INITIAL ENCOUNTER: Chronic | ICD-10-CM

## 2025-05-19 DIAGNOSIS — M26.10 UNSPECIFIED ANOMALY OF JAW-CRANIAL BASE RELATIONSHIP: Chronic | ICD-10-CM

## 2025-05-19 LAB — GLUCOSE BLDC GLUCOMTR-MCNC: 161 MG/DL — HIGH (ref 70–99)

## 2025-05-19 PROCEDURE — 78815 PET IMAGE W/CT SKULL-THIGH: CPT | Mod: PS

## 2025-05-19 PROCEDURE — A9552: CPT

## 2025-05-19 PROCEDURE — 78815 PET IMAGE W/CT SKULL-THIGH: CPT | Mod: 26,PS

## 2025-05-19 PROCEDURE — 82962 GLUCOSE BLOOD TEST: CPT

## 2025-05-20 DIAGNOSIS — C02.1 MALIGNANT NEOPLASM OF BORDER OF TONGUE: ICD-10-CM

## 2025-05-20 LAB — SURGICAL PATHOLOGY STUDY: SIGNIFICANT CHANGE UP

## 2025-05-21 DIAGNOSIS — Z96.641 PRESENCE OF RIGHT ARTIFICIAL HIP JOINT: ICD-10-CM

## 2025-05-21 DIAGNOSIS — Z79.4 LONG TERM (CURRENT) USE OF INSULIN: ICD-10-CM

## 2025-05-21 DIAGNOSIS — M16.12 UNILATERAL PRIMARY OSTEOARTHRITIS, LEFT HIP: ICD-10-CM

## 2025-05-21 DIAGNOSIS — J45.909 UNSPECIFIED ASTHMA, UNCOMPLICATED: ICD-10-CM

## 2025-05-21 DIAGNOSIS — Z85.810 PERSONAL HISTORY OF MALIGNANT NEOPLASM OF TONGUE: ICD-10-CM

## 2025-05-21 DIAGNOSIS — K90.0 CELIAC DISEASE: ICD-10-CM

## 2025-05-21 DIAGNOSIS — Z96.41 PRESENCE OF INSULIN PUMP (EXTERNAL) (INTERNAL): ICD-10-CM

## 2025-05-21 DIAGNOSIS — E78.5 HYPERLIPIDEMIA, UNSPECIFIED: ICD-10-CM

## 2025-05-21 DIAGNOSIS — E11.649 TYPE 2 DIABETES MELLITUS WITH HYPOGLYCEMIA WITHOUT COMA: ICD-10-CM

## 2025-05-31 RX ORDER — SULFAMETHOXAZOLE AND TRIMETHOPRIM 800; 160 MG/1; MG/1
800-160 TABLET ORAL
Qty: 10 | Refills: 0 | Status: ACTIVE | COMMUNITY
Start: 2025-05-31 | End: 1900-01-01

## 2025-06-05 ENCOUNTER — APPOINTMENT (OUTPATIENT)
Facility: CLINIC | Age: 55
End: 2025-06-05
Payer: COMMERCIAL

## 2025-06-05 PROCEDURE — 73502 X-RAY EXAM HIP UNI 2-3 VIEWS: CPT

## 2025-06-05 PROCEDURE — 99024 POSTOP FOLLOW-UP VISIT: CPT

## 2025-06-06 NOTE — PATIENT PROFILE ADULT - NSPROPTRIGHTNOTIFY_GEN_A_NUR
Group Topic: BH Physical Activity    Date: 6/6/2025  Start Time: 0930  End Time: 0950  Facilitators: Elaine Latif CTRS    Focus: Fitness  Number in attendance: 7  To provide a fitness group to encourage stretching and exercises to assist with the patients physical well-being as facilitated by the therapist.    Method: Group  Attendance: Present  Participation: Active  Patient Response: Able to return demonstration, Appropriate feedback, Attentive, Good eye contact, Interested in topic, and Interactive  Mood: Anxious and Depressed  Affect: Type: Anxious and Depressed   Range: Blunted/flat   Congruency: Congruent   Stability: Stable  Behavior/Socialization: Appropriate to group, Cooperative, and Engaged  Thought Process: Focused and Goal-directed  Task Performance: Follows directions  Patient Evaluation: Independent - full participation     declines

## 2025-07-05 ENCOUNTER — NON-APPOINTMENT (OUTPATIENT)
Age: 55
End: 2025-07-05

## 2025-07-07 ENCOUNTER — APPOINTMENT (OUTPATIENT)
Dept: OTOLARYNGOLOGY | Facility: CLINIC | Age: 55
End: 2025-07-07
Payer: COMMERCIAL

## 2025-07-07 VITALS — WEIGHT: 179 LBS | BODY MASS INDEX: 25.62 KG/M2 | HEIGHT: 70 IN

## 2025-07-07 PROCEDURE — 31575 DIAGNOSTIC LARYNGOSCOPY: CPT

## 2025-07-07 PROCEDURE — 99214 OFFICE O/P EST MOD 30 MIN: CPT | Mod: 25

## 2025-09-05 ENCOUNTER — APPOINTMENT (OUTPATIENT)
Dept: RADIATION ONCOLOGY | Facility: HOSPITAL | Age: 55
End: 2025-09-05

## 2025-09-05 VITALS
HEART RATE: 65 BPM | OXYGEN SATURATION: 97 % | RESPIRATION RATE: 16 BRPM | DIASTOLIC BLOOD PRESSURE: 71 MMHG | WEIGHT: 181 LBS | SYSTOLIC BLOOD PRESSURE: 114 MMHG | BODY MASS INDEX: 25.97 KG/M2 | TEMPERATURE: 97.6 F

## 2025-09-05 DIAGNOSIS — C02.1 MALIGNANT NEOPLASM OF BORDER OF TONGUE: ICD-10-CM

## 2025-09-05 DIAGNOSIS — C77.0 SECONDARY AND UNSPECIFIED MALIGNANT NEOPLASM OF LYMPH NODES OF HEAD, FACE AND NECK: ICD-10-CM

## 2025-09-05 PROCEDURE — G2211 COMPLEX E/M VISIT ADD ON: CPT

## 2025-09-05 PROCEDURE — 99213 OFFICE O/P EST LOW 20 MIN: CPT
